# Patient Record
Sex: FEMALE | Race: WHITE | Employment: PART TIME | URBAN - METROPOLITAN AREA
[De-identification: names, ages, dates, MRNs, and addresses within clinical notes are randomized per-mention and may not be internally consistent; named-entity substitution may affect disease eponyms.]

---

## 2018-09-25 ENCOUNTER — HOSPITAL ENCOUNTER (EMERGENCY)
Age: 23
Discharge: HOME | End: 2018-09-25
Attending: EMERGENCY MEDICINE
Payer: COMMERCIAL

## 2018-09-25 ENCOUNTER — APPOINTMENT (OUTPATIENT)
Dept: CT IMAGING | Age: 23
End: 2018-09-25
Payer: COMMERCIAL

## 2018-09-25 VITALS
DIASTOLIC BLOOD PRESSURE: 53 MMHG | HEART RATE: 76 BPM | RESPIRATION RATE: 16 BRPM | SYSTOLIC BLOOD PRESSURE: 98 MMHG | TEMPERATURE: 98.2 F | OXYGEN SATURATION: 98 % | BODY MASS INDEX: 19.12 KG/M2 | WEIGHT: 112 LBS | HEIGHT: 64 IN

## 2018-09-25 DIAGNOSIS — N12 PYELONEPHRITIS: Primary | ICD-10-CM

## 2018-09-25 LAB
ALBUMIN SERPL-MCNC: 4 G/DL (ref 3.9–4.9)
ALP BLD-CCNC: 63 U/L (ref 40–130)
ALT SERPL-CCNC: 9 U/L (ref 0–33)
ANION GAP SERPL CALCULATED.3IONS-SCNC: 12 MEQ/L (ref 7–13)
AST SERPL-CCNC: 9 U/L (ref 0–35)
BACTERIA: ABNORMAL /HPF
BASOPHILS ABSOLUTE: 0 K/UL (ref 0–0.2)
BASOPHILS RELATIVE PERCENT: 0.1 %
BILIRUB SERPL-MCNC: 0.5 MG/DL (ref 0–1.2)
BILIRUBIN URINE: NEGATIVE
BLOOD, URINE: ABNORMAL
BUN BLDV-MCNC: 9 MG/DL (ref 6–20)
CALCIUM SERPL-MCNC: 9.3 MG/DL (ref 8.6–10.2)
CHLORIDE BLD-SCNC: 96 MEQ/L (ref 98–107)
CLARITY: ABNORMAL
CO2: 29 MEQ/L (ref 22–29)
COLOR: YELLOW
CREAT SERPL-MCNC: 0.54 MG/DL (ref 0.5–0.9)
EOSINOPHILS ABSOLUTE: 0 K/UL (ref 0–0.7)
EOSINOPHILS RELATIVE PERCENT: 0.2 %
EPITHELIAL CELLS, UA: ABNORMAL /HPF
GFR AFRICAN AMERICAN: >60
GFR NON-AFRICAN AMERICAN: >60
GLOBULIN: 3.1 G/DL (ref 2.3–3.5)
GLUCOSE BLD-MCNC: 110 MG/DL (ref 74–109)
GLUCOSE URINE: NEGATIVE MG/DL
HCG(URINE) PREGNANCY TEST: NEGATIVE
HCT VFR BLD CALC: 36 % (ref 37–47)
HEMOGLOBIN: 12.5 G/DL (ref 12–16)
KETONES, URINE: 40 MG/DL
LACTIC ACID: 0.6 MMOL/L (ref 0.5–2.2)
LEUKOCYTE ESTERASE, URINE: ABNORMAL
LYMPHOCYTES ABSOLUTE: 1 K/UL (ref 1–4.8)
LYMPHOCYTES RELATIVE PERCENT: 7.6 %
MCH RBC QN AUTO: 32.3 PG (ref 27–31.3)
MCHC RBC AUTO-ENTMCNC: 34.7 % (ref 33–37)
MCV RBC AUTO: 92.9 FL (ref 82–100)
MONOCYTES ABSOLUTE: 1.2 K/UL (ref 0.2–0.8)
MONOCYTES RELATIVE PERCENT: 8.8 %
NEUTROPHILS ABSOLUTE: 10.9 K/UL (ref 1.4–6.5)
NEUTROPHILS RELATIVE PERCENT: 83.3 %
NITRITE, URINE: NEGATIVE
PDW BLD-RTO: 12.4 % (ref 11.5–14.5)
PH UA: 7 (ref 5–9)
PLATELET # BLD: 216 K/UL (ref 130–400)
POTASSIUM SERPL-SCNC: 3.9 MEQ/L (ref 3.5–5.1)
PROTEIN UA: >=300 MG/DL
RBC # BLD: 3.87 M/UL (ref 4.2–5.4)
RBC UA: ABNORMAL /HPF (ref 0–2)
SODIUM BLD-SCNC: 137 MEQ/L (ref 132–144)
SPECIFIC GRAVITY UA: 1.02 (ref 1–1.03)
TOTAL PROTEIN: 7.1 G/DL (ref 6.4–8.1)
URINE REFLEX TO CULTURE: YES
UROBILINOGEN, URINE: >=8 E.U./DL
WBC # BLD: 13.1 K/UL (ref 4.8–10.8)
WBC UA: ABNORMAL /HPF (ref 0–5)

## 2018-09-25 PROCEDURE — 6360000002 HC RX W HCPCS: Performed by: EMERGENCY MEDICINE

## 2018-09-25 PROCEDURE — 80053 COMPREHEN METABOLIC PANEL: CPT

## 2018-09-25 PROCEDURE — 84703 CHORIONIC GONADOTROPIN ASSAY: CPT

## 2018-09-25 PROCEDURE — 87086 URINE CULTURE/COLONY COUNT: CPT

## 2018-09-25 PROCEDURE — 81001 URINALYSIS AUTO W/SCOPE: CPT

## 2018-09-25 PROCEDURE — 99284 EMERGENCY DEPT VISIT MOD MDM: CPT

## 2018-09-25 PROCEDURE — 85025 COMPLETE CBC W/AUTO DIFF WBC: CPT

## 2018-09-25 PROCEDURE — 87186 SC STD MICRODIL/AGAR DIL: CPT

## 2018-09-25 PROCEDURE — 6370000000 HC RX 637 (ALT 250 FOR IP): Performed by: EMERGENCY MEDICINE

## 2018-09-25 PROCEDURE — 96375 TX/PRO/DX INJ NEW DRUG ADDON: CPT

## 2018-09-25 PROCEDURE — 87077 CULTURE AEROBIC IDENTIFY: CPT

## 2018-09-25 PROCEDURE — 36415 COLL VENOUS BLD VENIPUNCTURE: CPT

## 2018-09-25 PROCEDURE — 74176 CT ABD & PELVIS W/O CONTRAST: CPT

## 2018-09-25 PROCEDURE — 2580000003 HC RX 258: Performed by: EMERGENCY MEDICINE

## 2018-09-25 PROCEDURE — 83605 ASSAY OF LACTIC ACID: CPT

## 2018-09-25 PROCEDURE — 96374 THER/PROPH/DIAG INJ IV PUSH: CPT

## 2018-09-25 RX ORDER — IBUPROFEN 800 MG/1
800 TABLET ORAL EVERY 6 HOURS PRN
COMMUNITY
End: 2020-03-19

## 2018-09-25 RX ORDER — ACETAMINOPHEN 500 MG
1000 TABLET ORAL ONCE
Status: COMPLETED | OUTPATIENT
Start: 2018-09-25 | End: 2018-09-25

## 2018-09-25 RX ORDER — MORPHINE SULFATE 4 MG/ML
4 INJECTION, SOLUTION INTRAMUSCULAR; INTRAVENOUS ONCE
Status: COMPLETED | OUTPATIENT
Start: 2018-09-25 | End: 2018-09-25

## 2018-09-25 RX ORDER — 0.9 % SODIUM CHLORIDE 0.9 %
1000 INTRAVENOUS SOLUTION INTRAVENOUS ONCE
Status: COMPLETED | OUTPATIENT
Start: 2018-09-25 | End: 2018-09-25

## 2018-09-25 RX ORDER — SULFAMETHOXAZOLE AND TRIMETHOPRIM 800; 160 MG/1; MG/1
1 TABLET ORAL 2 TIMES DAILY
Qty: 28 TABLET | Refills: 0 | Status: SHIPPED | OUTPATIENT
Start: 2018-09-25 | End: 2018-10-09

## 2018-09-25 RX ORDER — ONDANSETRON 2 MG/ML
4 INJECTION INTRAMUSCULAR; INTRAVENOUS ONCE
Status: COMPLETED | OUTPATIENT
Start: 2018-09-25 | End: 2018-09-25

## 2018-09-25 RX ADMIN — ONDANSETRON 4 MG: 2 INJECTION INTRAMUSCULAR; INTRAVENOUS at 19:59

## 2018-09-25 RX ADMIN — SODIUM CHLORIDE 1000 ML: 900 INJECTION, SOLUTION INTRAVENOUS at 21:30

## 2018-09-25 RX ADMIN — CEFTRIAXONE 1 G: 1 INJECTION, POWDER, FOR SOLUTION INTRAMUSCULAR; INTRAVENOUS at 19:58

## 2018-09-25 RX ADMIN — ACETAMINOPHEN 1000 MG: 500 TABLET, FILM COATED ORAL at 19:59

## 2018-09-25 RX ADMIN — MORPHINE SULFATE 4 MG: 4 INJECTION INTRAVENOUS at 19:59

## 2018-09-25 RX ADMIN — SODIUM CHLORIDE 1000 ML: 9 INJECTION, SOLUTION INTRAVENOUS at 19:58

## 2018-09-25 ASSESSMENT — ENCOUNTER SYMPTOMS
NAUSEA: 1
VOMITING: 1

## 2018-09-25 ASSESSMENT — PAIN DESCRIPTION - PAIN TYPE: TYPE: ACUTE PAIN

## 2018-09-25 ASSESSMENT — PAIN DESCRIPTION - FREQUENCY: FREQUENCY: CONTINUOUS

## 2018-09-25 ASSESSMENT — PAIN SCALES - GENERAL
PAINLEVEL_OUTOF10: 0
PAINLEVEL_OUTOF10: 0
PAINLEVEL_OUTOF10: 10
PAINLEVEL_OUTOF10: 10

## 2018-09-25 ASSESSMENT — PAIN DESCRIPTION - ORIENTATION: ORIENTATION: RIGHT

## 2018-09-25 ASSESSMENT — PAIN DESCRIPTION - LOCATION: LOCATION: FLANK

## 2018-09-25 ASSESSMENT — PAIN DESCRIPTION - DESCRIPTORS: DESCRIPTORS: SPASM;SHARP

## 2018-09-26 NOTE — ED PROVIDER NOTES
[address] [place] [address] ED  eMERGENCY dEPARTMENT eNCOUnter      Pt Name: Spring Duarte  MRN: [id number]  [place] 1995  Date of evaluation: 9/25/2018  Provider: Nancy Penn, [address] [place] [address]       Chief Complaint   Patient presents with    Flank Pain    Emesis         HISTORY OF PRESENT ILLNESS   (Location/Symptom, Timing/Onset, Context/Setting, Quality, Duration, Modifying Factors, Severity)  Note limiting factors. Spring Duarte is a 20 y.o. female who presents to the emergency department For the evaluation of right flank pain. Patient states it is been sore for the past 3 days. States she had a fever yesterday and today. Patient did not take any antipyretics today. Patient denies blood in urine but has been taking Azo because she has a history of urinary tract infection. Denies history of kidney stone. States she does have pain in the right lower portion of her abdomen as well. Patient does complain of nausea and vomiting. Nursing Notes were reviewed. REVIEW OF SYSTEMS    (2-9 systems for level 4, 10 or more for level 5)     Review of Systems   Constitutional: Positive for fever. Gastrointestinal: Positive for nausea and vomiting. Genitourinary: Positive for flank pain. Skin: Negative for rash. Neurological: Negative for weakness. Except as noted above the remainder of the review of systems was reviewed and negative. PAST MEDICAL HISTORY   History reviewed. No pertinent past medical history. SURGICAL HISTORY       Past Surgical History:   Procedure Laterality Date    TONSILLECTOMY      TYMPANOSTOMY TUBE PLACEMENT           CURRENT MEDICATIONS       Previous Medications    IBUPROFEN (ADVIL;MOTRIN) 800 MG TABLET    Take 800 mg by mouth every 6 hours as needed for Pain       ALLERGIES     Patient has no known allergies. FAMILY HISTORY     History reviewed. No pertinent family history.        SOCIAL HISTORY       Social History     Social History    Marital status: Single     Spouse name: N/A    Number of children: N/A    Years of education: N/A     Social History Main Topics    Smoking status: Former Smoker     Types: Cigarettes    Smokeless tobacco: Never Used    Alcohol use No    Drug use: No    Sexual activity: Not Asked     Other Topics Concern    None     Social History Narrative    None       SCREENINGS             PHYSICAL EXAM    (up to 7 for level 4, 8 or more for level 5)     ED Triage Vitals [09/25/18 1840]   BP Temp Temp Source Pulse Resp SpO2 Height Weight   110/69 100.1 °F (37.8 °C) Oral 105 18 97 % 5' 4\" (1.626 m) 112 lb (50.8 kg)       Physical Exam   Constitutional: She appears well-developed and well-nourished. No distress. HENT:   Head: Normocephalic and atraumatic. Mouth/Throat: Oropharynx is clear and moist.   Eyes: Conjunctivae are normal.   Pupils are equally round and reacting normally. Extraoccular muscles are grossly intact. Neck: Normal range of motion. No tracheal deviation present. Cardiovascular: Regular rhythm, normal heart sounds and intact distal pulses. Exam reveals no friction rub. No murmur heard. Tachycardic   Pulmonary/Chest: Effort normal and breath sounds normal. No stridor. No respiratory distress. She has no wheezes. She has no rales. Abdominal: Soft. She exhibits no distension and no mass. There is no tenderness. There is no rebound and no guarding. Musculoskeletal: Normal range of motion. She exhibits tenderness. She exhibits no edema or deformity. Patient has tenderness at the right CVA. Neurological: She is alert. Answering questions appropriately. No gaze deficit. No gait abnormality. Moving all extremities. Skin: Skin is warm and dry. Psychiatric: She has a normal mood and affect. Judgment normal.   Nursing note and vitals reviewed.       EMERGENCY DEPARTMENT COURSE and DIFFERENTIAL DIAGNOSIS/MDM:   Vitals:    Vitals:    09/25/18 1840 09/25/18 2122 09/25/18 2125 09/25/18 2234   BP:

## 2018-09-28 LAB
ORGANISM: ABNORMAL
URINE CULTURE, ROUTINE: ABNORMAL
URINE CULTURE, ROUTINE: ABNORMAL

## 2020-03-19 ENCOUNTER — OFFICE VISIT (OUTPATIENT)
Dept: OBGYN CLINIC | Age: 25
End: 2020-03-19
Payer: COMMERCIAL

## 2020-03-19 VITALS
WEIGHT: 112 LBS | SYSTOLIC BLOOD PRESSURE: 98 MMHG | DIASTOLIC BLOOD PRESSURE: 64 MMHG | BODY MASS INDEX: 19.12 KG/M2 | HEIGHT: 64 IN

## 2020-03-19 DIAGNOSIS — N93.8 DUB (DYSFUNCTIONAL UTERINE BLEEDING): ICD-10-CM

## 2020-03-19 LAB
GONADOTROPIN, CHORIONIC (HCG) QUANT: <0.1 MIU/ML
HCT VFR BLD CALC: 38.1 % (ref 37–47)
HEMOGLOBIN: 12.9 G/DL (ref 12–16)
MCH RBC QN AUTO: 32.2 PG (ref 27–31.3)
MCHC RBC AUTO-ENTMCNC: 33.8 % (ref 33–37)
MCV RBC AUTO: 95.3 FL (ref 82–100)
PDW BLD-RTO: 12.7 % (ref 11.5–14.5)
PLATELET # BLD: 195 K/UL (ref 130–400)
RBC # BLD: 4 M/UL (ref 4.2–5.4)
TSH REFLEX: 1.55 UIU/ML (ref 0.44–3.86)
WBC # BLD: 5.2 K/UL (ref 4.8–10.8)

## 2020-03-19 PROCEDURE — 1036F TOBACCO NON-USER: CPT | Performed by: OBSTETRICS & GYNECOLOGY

## 2020-03-19 PROCEDURE — G8427 DOCREV CUR MEDS BY ELIG CLIN: HCPCS | Performed by: OBSTETRICS & GYNECOLOGY

## 2020-03-19 PROCEDURE — G8420 CALC BMI NORM PARAMETERS: HCPCS | Performed by: OBSTETRICS & GYNECOLOGY

## 2020-03-19 PROCEDURE — G8484 FLU IMMUNIZE NO ADMIN: HCPCS | Performed by: OBSTETRICS & GYNECOLOGY

## 2020-03-19 PROCEDURE — 99385 PREV VISIT NEW AGE 18-39: CPT | Performed by: OBSTETRICS & GYNECOLOGY

## 2020-03-19 RX ORDER — ETONOGESTREL AND ETHINYL ESTRADIOL 11.7; 2.7 MG/1; MG/1
1 INSERT, EXTENDED RELEASE VAGINAL
COMMUNITY
Start: 2019-08-19 | End: 2022-04-27

## 2020-03-19 ASSESSMENT — ENCOUNTER SYMPTOMS
RESPIRATORY NEGATIVE: 1
RECTAL PAIN: 0
VOMITING: 0
ABDOMINAL DISTENTION: 0
BLOOD IN STOOL: 0
EYES NEGATIVE: 1
ANAL BLEEDING: 0
ALLERGIC/IMMUNOLOGIC NEGATIVE: 1
DIARRHEA: 0
CONSTIPATION: 0
NAUSEA: 0
ABDOMINAL PAIN: 0

## 2020-03-19 ASSESSMENT — PATIENT HEALTH QUESTIONNAIRE - PHQ9
1. LITTLE INTEREST OR PLEASURE IN DOING THINGS: 1
2. FEELING DOWN, DEPRESSED OR HOPELESS: 0
SUM OF ALL RESPONSES TO PHQ QUESTIONS 1-9: 1
SUM OF ALL RESPONSES TO PHQ QUESTIONS 1-9: 1
SUM OF ALL RESPONSES TO PHQ9 QUESTIONS 1 & 2: 1

## 2020-03-19 NOTE — PROGRESS NOTES
Subjective:      Patient ID: Pineda Larson is a 21 y.o. female    Annual exam.   Pap performed. STD screening offered. Monthly SBE encouraged. F/U annual or prn. Pt also wished to discuss irregular cycles x last 2 months on Nuvaring extending her appointment time by 15 minutes. Patient states she started Nuvaring continuously for heavy cycles. Patient denies new sexual partners or abnormal vaginal discharge. No recent blood thinners or steroid injections. Denies new or changed meds. Denies trauma. No major weight changes or increase in stress. Ordered labs and US. STD screening performed. F/U results as directed. Patient also wished to discuss medical management for irregular cycles. Discussed OCP, Depo Provera, Nexplanon,  Mirena and Hilary IUD. Discussed risks and benefits of each method and all questions answered. After discussion, patient opted to have Hilary placed. Importance of STD prevention with Hilary in place discussed and all questions answered. Will order IUD and call patient when available for placement. Vitals:  BP 98/64   Ht 5' 3.5\" (1.613 m)   Wt 112 lb (50.8 kg)   BMI 19.53 kg/m²   Past Medical History:   Diagnosis Date    Abnormal Pap smear of cervix     HPV in female      Past Surgical History:   Procedure Laterality Date    CYST REMOVAL      DILATION AND CURETTAGE      TONSILLECTOMY      TYMPANOSTOMY TUBE PLACEMENT       Allergies:  Diphenhydramine and Hydrocodone-acetaminophen  Current Outpatient Medications   Medication Sig Dispense Refill    etonogestrel-ethinyl estradiol (NUVARING) 0.12-0.015 MG/24HR vaginal ring 1 each by Other route       No current facility-administered medications for this visit.       Social History     Socioeconomic History    Marital status: Single     Spouse name: Not on file    Number of children: Not on file    Years of education: Not on file    Highest education level: Not on file   Occupational History    Not on file   Social Needs    Financial resource strain: Not on file    Food insecurity     Worry: Not on file     Inability: Not on file    Transportation needs     Medical: Not on file     Non-medical: Not on file   Tobacco Use    Smoking status: Former Smoker     Types: Cigarettes    Smokeless tobacco: Never Used   Substance and Sexual Activity    Alcohol use: No     Comment: socially    Drug use: No    Sexual activity: Not Currently   Lifestyle    Physical activity     Days per week: Not on file     Minutes per session: Not on file    Stress: Not on file   Relationships    Social connections     Talks on phone: Not on file     Gets together: Not on file     Attends [employer] service: Not on file     Active member of club or organization: Not on file     Attends meetings of clubs or organizations: Not on file     Relationship status: Not on file    Intimate partner violence     Fear of current or ex partner: Not on file     Emotionally abused: Not on file     Physically abused: Not on file     Forced sexual activity: Not on file   Other Topics Concern    Not on file   Social History Narrative    Not on file     Family History   Problem Relation Age of Onset    Cervical Cancer Mother     Cancer Mother     Bipolar Disorder Mother     Anxiety Disorder Mother     Migraines Mother     Breast Cancer Neg Hx     Colon Cancer Neg Hx     Diabetes Neg Hx     Eclampsia Neg Hx     Hypertension Neg Hx     Ovarian Cancer Neg Hx      Labor Neg Hx     Spont Abortions Neg Hx     Stroke Neg Hx        Review of Systems   Constitutional: Negative. Negative for activity change, appetite change, chills, diaphoresis, fatigue, fever and unexpected weight change. HENT: Negative. Eyes: Negative. Respiratory: Negative. Cardiovascular: Negative.     Gastrointestinal: Negative for abdominal distention, abdominal pain, anal bleeding, blood in stool, constipation, diarrhea, nausea, rectal pain and vomiting. Endocrine: Negative. Genitourinary: Positive for menstrual problem (DUB). Negative for decreased urine volume, difficulty urinating, dyspareunia, dysuria, enuresis, flank pain, frequency, genital sores, hematuria, pelvic pain, urgency, vaginal bleeding, vaginal discharge and vaginal pain. Musculoskeletal: Negative. Skin: Negative. Allergic/Immunologic: Negative. Neurological: Negative. Hematological: Negative. Psychiatric/Behavioral: Negative. Objective:     Physical Exam  Constitutional:       Appearance: She is well-developed. HENT:      Head: Normocephalic. Neck:      Musculoskeletal: Normal range of motion and neck supple. Thyroid: No thyromegaly. Cardiovascular:      Rate and Rhythm: Normal rate and regular rhythm. Heart sounds: Normal heart sounds. Pulmonary:      Effort: Pulmonary effort is normal. No respiratory distress. Breath sounds: Normal breath sounds. No wheezing or rales. Chest:      Chest wall: No tenderness. Breasts:         Right: No mass, nipple discharge, skin change or tenderness. Left: No mass, nipple discharge, skin change or tenderness. Abdominal:      General: Bowel sounds are normal. There is no distension. Palpations: Abdomen is soft. There is no mass. Tenderness: There is no abdominal tenderness. There is no guarding or rebound. Hernia: There is no hernia in the right inguinal area or left inguinal area. Genitourinary:     Labia:         Right: No rash, tenderness, lesion or injury. Left: No rash, tenderness, lesion or injury. Vagina: Normal. No signs of injury and foreign body. No vaginal discharge, erythema, tenderness or bleeding. Cervix: No cervical motion tenderness, discharge or friability. Uterus: Not deviated, not enlarged, not fixed and not tender. Adnexa:         Right: No mass, tenderness or fullness. Left: No mass, tenderness or fullness. Rectum: Normal.   Musculoskeletal: Normal range of motion. General: No tenderness. Lymphadenopathy:      Cervical: No cervical adenopathy. Skin:     General: Skin is warm and dry. Coloration: Skin is not pale. Findings: No erythema or rash. Neurological:      Mental Status: She is alert and oriented to person, place, and time. Psychiatric:         Behavior: Behavior normal.         Thought Content: Thought content normal.         Judgment: Judgment normal.         Assessment:       Diagnosis Orders   1. Women's annual routine gynecological examination  PAP SMEAR   2. Screening for human papillomavirus  PAP SMEAR   3. Routine screening for STI (sexually transmitted infection)  PAP SMEAR   4. DUB (dysfunctional uterine bleeding)  TSH with Reflex    HCG, Quantitative, Pregnancy    CBC    US Pelvis Complete    US Non OB Transvaginal        Plan:      Medications placedthis encounter:  No orders of the defined types were placed in this encounter. Orders placedthis encounter:  Orders Placed This Encounter   Procedures    US Pelvis Complete     Standing Status:   Future     Standing Expiration Date:   3/19/2021    US Non OB Transvaginal     Standing Status:   Future     Standing Expiration Date:   3/19/2021    PAP SMEAR     Standing Status:   Future     Standing Expiration Date:   3/19/2021     Order Specific Question:   Collection Type     Answer: Thin Prep     Order Specific Question:   Prior Abnormal Pap Test     Answer:   No     Order Specific Question:   Screening or Diagnostic     Answer:   Screening     Order Specific Question:   HPV Requested?      Answer:   Yes     Order Specific Question:   High Risk Patient     Answer:   N/A    TSH with Reflex     Standing Status:   Future     Standing Expiration Date:   3/19/2021    HCG, Quantitative, Pregnancy     Standing Status:   Future     Standing Expiration Date:   3/19/2021    CBC     Standing Status:   Future     Standing Expiration Date:   3/19/2021         Follow up:  Return for Annual, Ultrasound, Results Review, Hilary Insertion. Repeat Annual GYN exam every 1 year. Cervical Cytology exam starts age 24. If Negative Cytology;  follow-up screening per current guidelines. Mammograms yearly starting at age 36. Calcium and Vitamin D dosing reviewed ( age appropriate ). Colonoscopy and bone density screening discussed ( age appropriate ). Birth control and STD prevention discussed ( age appropriate ). Gardisil counseling completed for all patients 7-35 yo. Routine health maintenance ( per PCP and guidelines ). The patient was asked if she would like a chaperone present for her intimate exam. She  Declined the chaperone.  Diane Zheng

## 2020-03-25 NOTE — LETTER
[facility]  [place] [address]  Phone: [phone]  Fax: [phone]          March 26, 2020    Buddy Sofia  [address] [place] [id number]      Dear [facility] Police: The results of your most recent Pap smear are normal. This means that no cancerous or precancerous cells were seen. We recommend that you come back in 1 year for your next routine Pap smear. If you have any questions or concerns, please don't hesitate to call.     Sincerely,        Dr Jerald Calabrese

## 2020-03-30 ENCOUNTER — TELEPHONE (OUTPATIENT)
Dept: OBGYN CLINIC | Age: 25
End: 2020-03-30

## 2020-03-30 NOTE — TELEPHONE ENCOUNTER
Patient called today concerned about heavy bleeding on Nuva ring, pt evaluated on 3-19-20, and was informed to keep in the [address] until [facility] comes in. Please look into order for IUD and contact patient once order placed and advice if to remove ring (pt states that she is very uncomfortable and feeling dizzy). Call transferred for patient to schedule u/s.

## 2020-03-30 NOTE — TELEPHONE ENCOUNTER
Pt called back inquiring about the IUD [facility] [place]), however, no order was placed . Pt states she's been having non stop bleeding and the Radiology dept will not schedule US for DUB until 6/1/2020. I told the patient that an order will be placed today and someone will get back to her regarding the [address] getting done.

## 2020-03-30 NOTE — TELEPHONE ENCOUNTER
Per Abdulkadir Cano to order [facility]. Pt aware and will await for the phone to call when the device arrives. Patient can wait til after the the Aretha Ashford is placed to the have the [address] done. Advised patient no need to be put on another type of medication therapy while awaiting on the device to arrive.  Patient understands

## 2020-04-08 ENCOUNTER — TELEPHONE (OUTPATIENT)
Dept: OBGYN CLINIC | Age: 25
End: 2020-04-08

## 2020-04-14 ENCOUNTER — PROCEDURE VISIT (OUTPATIENT)
Dept: OBGYN CLINIC | Age: 25
End: 2020-04-14
Payer: COMMERCIAL

## 2020-04-14 VITALS
HEIGHT: 64 IN | DIASTOLIC BLOOD PRESSURE: 70 MMHG | WEIGHT: 114 LBS | BODY MASS INDEX: 19.46 KG/M2 | SYSTOLIC BLOOD PRESSURE: 112 MMHG

## 2020-04-14 LAB
HCG, URINE, POC: NEGATIVE
Lab: NORMAL
NEGATIVE QC PASS/FAIL: NORMAL
POSITIVE QC PASS/FAIL: NORMAL

## 2020-04-14 PROCEDURE — 81025 URINE PREGNANCY TEST: CPT | Performed by: OBSTETRICS & GYNECOLOGY

## 2020-04-14 PROCEDURE — 58300 INSERT INTRAUTERINE DEVICE: CPT | Performed by: OBSTETRICS & GYNECOLOGY

## 2020-04-14 RX ORDER — IBUPROFEN 800 MG/1
800 TABLET ORAL EVERY 8 HOURS PRN
Qty: 40 TABLET | Refills: 0 | Status: SHIPPED | OUTPATIENT
Start: 2020-04-14

## 2020-04-14 ASSESSMENT — ENCOUNTER SYMPTOMS
BLOOD IN STOOL: 0
CONSTIPATION: 0
EYES NEGATIVE: 1
RECTAL PAIN: 0
NAUSEA: 0
VOMITING: 0
RESPIRATORY NEGATIVE: 1
ALLERGIC/IMMUNOLOGIC NEGATIVE: 1
ANAL BLEEDING: 0
DIARRHEA: 0
ABDOMINAL DISTENTION: 0
ABDOMINAL PAIN: 0

## 2020-04-14 NOTE — PROGRESS NOTES
IUD Insertion:   Patient here for IUD ( Mirena ) placement. Urine HCG negative and denies unprotected intercourse. Cervix prepped in routine and sterile fashion. Insertion of IUD per routine without difficulty. Strings cut to 2-3 cm. Patient tolerated procedure well. F/U US 2-4 weeks to confirm appropriate placement. Vitals: There were no vitals taken for this visit. Past Medical History:   Diagnosis Date    Abnormal Pap smear of cervix     HPV in female      Past Surgical History:   Procedure Laterality Date    CYST REMOVAL      DILATION AND CURETTAGE      TONSILLECTOMY      TYMPANOSTOMY TUBE PLACEMENT       Allergies:  Diphenhydramine and Hydrocodone-acetaminophen  Current Outpatient Medications   Medication Sig Dispense Refill    etonogestrel-ethinyl estradiol (NUVARING) 0.12-0.015 MG/24HR vaginal ring 1 each by Other route       No current facility-administered medications for this visit.       Social History     Socioeconomic History    Marital status: Single     Spouse name: Not on file    Number of children: Not on file    Years of education: Not on file    Highest education level: Not on file   Occupational History    Not on file   Social Needs    Financial resource strain: Not on file    Food insecurity     Worry: Not on file     Inability: Not on file    Transportation needs     Medical: Not on file     Non-medical: Not on file   Tobacco Use    Smoking status: Former Smoker     Types: Cigarettes    Smokeless tobacco: Never Used   Substance and Sexual Activity    Alcohol use: No     Comment: socially    Drug use: No    Sexual activity: Not Currently   Lifestyle    Physical activity     Days per week: Not on file     Minutes per session: Not on file    Stress: Not on file   Relationships    Social connections     Talks on phone: Not on file     Gets together: Not on file     Attends [employer] service: Not on file     Active member of club or organization: Not on file

## 2020-04-14 NOTE — PROGRESS NOTES
A timeout was performed immediately prior to the start of the IUD insertion procedure and included the correct patient (two identifiers), correct procedure and correct site(s). Procedure consent and allergies were also verified.

## 2020-06-18 ENCOUNTER — TELEPHONE (OUTPATIENT)
Dept: OBGYN CLINIC | Age: 25
End: 2020-06-18

## 2022-04-27 ENCOUNTER — OFFICE VISIT (OUTPATIENT)
Dept: FAMILY MEDICINE CLINIC | Age: 27
End: 2022-04-27
Payer: MEDICAID

## 2022-04-27 VITALS
HEIGHT: 63 IN | BODY MASS INDEX: 19.84 KG/M2 | OXYGEN SATURATION: 99 % | WEIGHT: 112 LBS | HEART RATE: 79 BPM | DIASTOLIC BLOOD PRESSURE: 60 MMHG | TEMPERATURE: 98.2 F | SYSTOLIC BLOOD PRESSURE: 100 MMHG

## 2022-04-27 DIAGNOSIS — F43.10 PTSD (POST-TRAUMATIC STRESS DISORDER): ICD-10-CM

## 2022-04-27 DIAGNOSIS — F31.73 BIPOLAR DISORDER, IN PARTIAL REMISSION, MOST RECENT EPISODE MANIC (HCC): Primary | ICD-10-CM

## 2022-04-27 DIAGNOSIS — F41.9 ANXIETY: ICD-10-CM

## 2022-04-27 DIAGNOSIS — J45.30 MILD PERSISTENT ASTHMA WITHOUT COMPLICATION: ICD-10-CM

## 2022-04-27 PROBLEM — F31.9 BIPOLAR DISORDER (HCC): Status: ACTIVE | Noted: 2022-04-27

## 2022-04-27 PROCEDURE — 99204 OFFICE O/P NEW MOD 45 MIN: CPT | Performed by: NURSE PRACTITIONER

## 2022-04-27 RX ORDER — IBUPROFEN 800 MG/1
800 TABLET ORAL EVERY 8 HOURS PRN
Qty: 40 TABLET | Refills: 0 | Status: CANCELLED | OUTPATIENT
Start: 2022-04-27

## 2022-04-27 RX ORDER — LORAZEPAM 2 MG/1
2 TABLET ORAL EVERY 6 HOURS PRN
Status: CANCELLED | OUTPATIENT
Start: 2022-04-27

## 2022-04-27 RX ORDER — ALBUTEROL SULFATE 90 UG/1
2 AEROSOL, METERED RESPIRATORY (INHALATION) EVERY 4 HOURS PRN
COMMUNITY
Start: 2020-12-03 | End: 2022-04-27 | Stop reason: SDUPTHER

## 2022-04-27 RX ORDER — FLUTICASONE PROPIONATE AND SALMETEROL 100; 50 UG/1; UG/1
1 POWDER RESPIRATORY (INHALATION) 2 TIMES DAILY
Qty: 1 EACH | Refills: 2 | Status: SHIPPED | OUTPATIENT
Start: 2022-04-27

## 2022-04-27 RX ORDER — LORAZEPAM 2 MG/1
2 TABLET ORAL EVERY 6 HOURS PRN
COMMUNITY
End: 2022-04-27

## 2022-04-27 RX ORDER — LAMOTRIGINE 100 MG/1
100 TABLET ORAL DAILY
Qty: 30 TABLET | Status: CANCELLED | OUTPATIENT
Start: 2022-04-27

## 2022-04-27 RX ORDER — BUSPIRONE HYDROCHLORIDE 5 MG/1
5 TABLET ORAL 3 TIMES DAILY
Qty: 90 TABLET | Refills: 0 | Status: SHIPPED | OUTPATIENT
Start: 2022-04-27 | End: 2022-05-27

## 2022-04-27 RX ORDER — LAMOTRIGINE 100 MG/1
100 TABLET ORAL DAILY
COMMUNITY
End: 2022-04-27

## 2022-04-27 RX ORDER — ALBUTEROL SULFATE 90 UG/1
2 AEROSOL, METERED RESPIRATORY (INHALATION) EVERY 6 HOURS PRN
Qty: 1 EACH | Refills: 1 | Status: SHIPPED | OUTPATIENT
Start: 2022-04-27

## 2022-04-27 SDOH — ECONOMIC STABILITY: TRANSPORTATION INSECURITY
IN THE PAST 12 MONTHS, HAS THE LACK OF TRANSPORTATION KEPT YOU FROM MEDICAL APPOINTMENTS OR FROM GETTING MEDICATIONS?: NO

## 2022-04-27 SDOH — ECONOMIC STABILITY: FOOD INSECURITY: WITHIN THE PAST 12 MONTHS, THE FOOD YOU BOUGHT JUST DIDN'T LAST AND YOU DIDN'T HAVE MONEY TO GET MORE.: NEVER TRUE

## 2022-04-27 SDOH — ECONOMIC STABILITY: FOOD INSECURITY: WITHIN THE PAST 12 MONTHS, YOU WORRIED THAT YOUR FOOD WOULD RUN OUT BEFORE YOU GOT MONEY TO BUY MORE.: NEVER TRUE

## 2022-04-27 SDOH — ECONOMIC STABILITY: TRANSPORTATION INSECURITY
IN THE PAST 12 MONTHS, HAS LACK OF TRANSPORTATION KEPT YOU FROM MEETINGS, WORK, OR FROM GETTING THINGS NEEDED FOR DAILY LIVING?: NO

## 2022-04-27 ASSESSMENT — PATIENT HEALTH QUESTIONNAIRE - PHQ9
SUM OF ALL RESPONSES TO PHQ QUESTIONS 1-9: 0
SUM OF ALL RESPONSES TO PHQ9 QUESTIONS 1 & 2: 0
SUM OF ALL RESPONSES TO PHQ QUESTIONS 1-9: 0
2. FEELING DOWN, DEPRESSED OR HOPELESS: 0
SUM OF ALL RESPONSES TO PHQ QUESTIONS 1-9: 0
SUM OF ALL RESPONSES TO PHQ QUESTIONS 1-9: 0
1. LITTLE INTEREST OR PLEASURE IN DOING THINGS: 0

## 2022-04-27 ASSESSMENT — SOCIAL DETERMINANTS OF HEALTH (SDOH): HOW HARD IS IT FOR YOU TO PAY FOR THE VERY BASICS LIKE FOOD, HOUSING, MEDICAL CARE, AND HEATING?: NOT HARD AT ALL

## 2022-04-27 NOTE — PROGRESS NOTES
Subjective:     Patient ID: Alberto Huff is a 31 y.o. female who presentstoday for:  Chief Complaint   Patient presents with    New Patient     Patient presents today to establish care.         HPI   New patient establish care  Has not been seen by PCP or on medication for over a year  Has h/o bipolar, ptsd, anxiety    Past Medical History:   Diagnosis Date    Abnormal Pap smear of cervix     Bipolar and related disorder ([facility]     Bipolar disorder ([facility] 2022    HPV in female     PTSD (post-traumatic stress disorder)      Current Outpatient Medications on File Prior to Visit   Medication Sig Dispense Refill    ibuprofen (ADVIL;MOTRIN) 800 MG tablet Take 1 tablet by mouth every 8 hours as needed for Pain (Patient not taking: Reported on 2022) 40 tablet 0     Current Facility-Administered Medications on File Prior to Visit   Medication Dose Route Frequency Provider Last Rate Last Admin    levonorgestrel (MIRENA) IUD 52 mg 1 each  1 each IntraUTERine Once Nila Reich MD   1 each at 20 1119     Past Surgical History:   Procedure Laterality Date    CYST REMOVAL      DILATION AND CURETTAGE      TONSILLECTOMY      TYMPANOSTOMY TUBE PLACEMENT          Family History   Problem Relation Age of Onset    Cervical Cancer Mother     Cancer Mother     Bipolar Disorder Mother     Anxiety Disorder Mother     Migraines Mother     Breast Cancer Neg Hx     Colon Cancer Neg Hx     Diabetes Neg Hx     Eclampsia Neg Hx     Hypertension Neg Hx     Ovarian Cancer Neg Hx      Labor Neg Hx     Spont Abortions Neg Hx     Stroke Neg Hx      Social History     Socioeconomic History    Marital status: Single     Spouse name: Not on file    Number of children: Not on file    Years of education: Not on file    Highest education level: Not on file   Occupational History    Not on file   Tobacco Use    Smoking status: Former Smoker     Types: Cigarettes    Smokeless tobacco: Never Used Vaping Use    Vaping Use: Every day    Substances: Nicotine   Substance and Sexual Activity    Alcohol use: Yes     Comment: socially    Drug use: No    Sexual activity: Not Currently   Other Topics Concern    Not on file   Social History Narrative    Not on file     Social Determinants of Health     Financial Resource Strain: Low Risk     Difficulty of Paying Living Expenses: Not hard at all   Food Insecurity: No Food Insecurity    Worried About Running Out of Food in the Last Year: Never true    Navid of Food in the Last Year: Never true   Transportation Needs: No Transportation Needs    Lack of Transportation (Medical): No    Lack of Transportation (Non-Medical): No   Physical Activity:     Days of Exercise per Week: Not on file    Minutes of Exercise per Session: Not on file   Stress:     Feeling of Stress : Not on file   Social Connections:     Frequency of Communication with Friends and Family: Not on file    Frequency of Social Gatherings with Friends and Family: Not on file    Attends Sabianism Services: Not on file    Active Member of [address] [place] [address] [employer] or Organizations: Not on file    Attends Club or Organization Meetings: Not on file    Marital Status: Not on file   Intimate Partner Violence:     Fear of Current or Ex-Partner: Not on file    Emotionally Abused: Not on file    Physically Abused: Not on file    Sexually Abused: Not on file   Housing Stability:     Unable to Pay for Housing in the Last Year: Not on file    Number of [place] in the Last Year: Not on file    Unstable Housing in the Last Year: Not on file     Allergies:  Diphenhydramine and Hydrocodone-acetaminophen    Review of Systems   Constitutional: Negative. Negative for chills, diaphoresis, fatigue and fever. HENT: Negative. Negative for congestion, sore throat and trouble swallowing. Eyes: Negative. Respiratory: Positive for wheezing. Negative for cough and shortness of breath.     Cardiovascular: Negative for chest pain, palpitations and leg swelling. Gastrointestinal: Negative for abdominal pain, blood in stool, constipation, diarrhea, nausea and vomiting. Endocrine: Negative. Genitourinary: Negative. Negative for difficulty urinating. Musculoskeletal: Negative for arthralgias, gait problem, joint swelling and myalgias. Skin: Negative. Allergic/Immunologic: Positive for environmental allergies. Neurological: Negative for dizziness, syncope, speech difficulty, weakness, light-headedness and headaches. Psychiatric/Behavioral: Positive for dysphoric mood and sleep disturbance. Negative for self-injury and suicidal ideas. The patient is nervous/anxious. Objective:    /60 (Site: Left Upper Arm, Position: Sitting, Cuff Size: Medium Adult)   Pulse 79   Temp 98.2 °F (36.8 °C) (Temporal)   Ht 5' 3\" (1.6 m)   Wt 112 lb (50.8 kg)   SpO2 99%   BMI 19.84 kg/m²     Physical Exam  Constitutional:       General: She is not in acute distress. Appearance: She is well-developed. She is not toxic-appearing or diaphoretic. HENT:      Head: Normocephalic and atraumatic. Right Ear: External ear normal.      Left Ear: External ear normal.      Mouth/Throat:      Mouth: Mucous membranes are moist.   Eyes:      Extraocular Movements: Extraocular movements intact. Conjunctiva/sclera: Conjunctivae normal.      Pupils: Pupils are equal, round, and reactive to light. Cardiovascular:      Rate and Rhythm: Normal rate and regular rhythm. Heart sounds: Normal heart sounds. Pulmonary:      Effort: Pulmonary effort is normal. No respiratory distress. Breath sounds: Normal breath sounds. No wheezing. Abdominal:      General: Bowel sounds are normal.      Tenderness: There is no abdominal tenderness. Musculoskeletal:         General: No swelling or tenderness. Normal range of motion. Cervical back: Normal range of motion and neck supple. No tenderness.       Right lower leg: No edema. Left lower leg: No edema. Lymphadenopathy:      Cervical: No cervical adenopathy. Skin:     General: Skin is warm and dry. Neurological:      General: No focal deficit present. Mental Status: She is alert and oriented to person, place, and time. Cranial Nerves: No cranial nerve deficit. Motor: No weakness. Gait: Gait normal.   Psychiatric:         Mood and Affect: Mood normal.         Speech: Speech normal.         Behavior: Behavior is cooperative. Thought Content: Thought content normal. Thought content is not paranoid. Thought content does not include homicidal or suicidal ideation. Judgment: Judgment normal.         Assessment & Plan:       Diagnosis Orders   1. Bipolar disorder, in partial remission, most recent episode manic [facility])  Ambulatory referral to Psychiatry    Ambulatory referral to Psychology   2. PTSD (post-traumatic stress disorder)  Ambulatory referral to Psychiatry    Ambulatory referral to Psychology   3. Mild persistent asthma without complication  albuterol sulfate  (90 Base) MCG/ACT inhaler    fluticasone-salmeterol (ADVAIR DISKUS) 100-50 MCG/ACT AEPB diskus inhaler   4.  Anxiety  busPIRone (BUSPAR) 5 MG tablet     Orders Placed This Encounter   Procedures    Ambulatory referral to Psychiatry     Referral Priority:   Routine     Referral Type:   Psychiatric     Referral Reason:   Specialty Services Required     Referred to Provider:   Adelso Snyder MD     Requested Specialty:   Psychiatry     Number of Visits Requested:   1    Ambulatory referral to Psychology     Referral Priority:   Routine     Referral Type:   Psychiatric     Referral Reason:   Specialty Services Required     Referred to Provider:   Vanessa Oviedo PSYD     Requested Specialty:   Psychology     Number of Visits Requested:   1     Orders Placed This Encounter   Medications    albuterol sulfate  (90 Base) MCG/ACT inhaler     Sig: Inhale 2 puffs into the lungs every 6 hours as needed for Wheezing or Shortness of Breath     Dispense:  1 each     Refill:  1    fluticasone-salmeterol (ADVAIR DISKUS) 100-50 MCG/ACT AEPB diskus inhaler     Sig: Inhale 1 puff into the lungs 2 times daily Rinse after use     Dispense:  1 each     Refill:  2    busPIRone (BUSPAR) 5 MG tablet     Sig: Take 1 tablet by mouth 3 times daily     Dispense:  90 tablet     Refill:  0     Medications Discontinued During This Encounter   Medication Reason    etonogestrel-ethinyl estradiol (NUVARING) 0.12-0.015 MG/24HR vaginal ring     lamoTRIgine (LAMICTAL) 100 MG tablet LIST CLEANUP    sertraline (ZOLOFT) 50 MG tablet LIST CLEANUP    LORazepam (ATIVAN) 2 MG tablet LIST CLEANUP    albuterol sulfate  (90 Base) MCG/ACT inhaler REORDER     No follow-ups on file. Reviewed with the patient: currentclinical status, medications, activities and diet. Side effects, adverse effects of the medicationprescribed today, as well as treatment plan/ rationale and result expectations havebeen discussed with the patient who expresses understanding and desires to proceed. Pt instructions reviewed and given to patient.     Close follow up to evaluate treatment resultsand for coordination of care. I have reviewed the patient's medical historyin detail and updated the computerized patient record.     Mora Sanchez, RENETTA - CNP

## 2022-05-02 ASSESSMENT — ENCOUNTER SYMPTOMS
DIARRHEA: 0
BLOOD IN STOOL: 0
TROUBLE SWALLOWING: 0
COUGH: 0
ABDOMINAL PAIN: 0
CONSTIPATION: 0
EYES NEGATIVE: 1
SHORTNESS OF BREATH: 0
VOMITING: 0
NAUSEA: 0
SORE THROAT: 0
WHEEZING: 1

## 2022-05-12 ENCOUNTER — TELEMEDICINE (OUTPATIENT)
Dept: BEHAVIORAL/MENTAL HEALTH CLINIC | Age: 27
End: 2022-05-12
Payer: COMMERCIAL

## 2022-05-12 DIAGNOSIS — F43.9 TRAUMA AND STRESSOR-RELATED DISORDER: Primary | ICD-10-CM

## 2022-05-12 DIAGNOSIS — F41.1 GENERALIZED ANXIETY DISORDER: ICD-10-CM

## 2022-05-12 PROCEDURE — 90791 PSYCH DIAGNOSTIC EVALUATION: CPT | Performed by: PSYCHOLOGIST

## 2022-05-12 ASSESSMENT — ANXIETY QUESTIONNAIRES
2. NOT BEING ABLE TO STOP OR CONTROL WORRYING: 2-OVER HALF THE DAYS
6. BECOMING EASILY ANNOYED OR IRRITABLE: 1-SEVERAL DAYS
1. FEELING NERVOUS, ANXIOUS, OR ON EDGE: 3
7. FEELING AFRAID AS IF SOMETHING AWFUL MIGHT HAPPEN: 2-OVER HALF THE DAYS
3. WORRYING TOO MUCH ABOUT DIFFERENT THINGS: 2-OVER HALF THE DAYS
GAD7 TOTAL SCORE: 14
4. TROUBLE RELAXING: 2-OVER HALF THE DAYS
5. BEING SO RESTLESS THAT IT IS HARD TO SIT STILL: 2-OVER HALF THE DAYS

## 2022-05-12 ASSESSMENT — PATIENT HEALTH QUESTIONNAIRE - PHQ9
SUM OF ALL RESPONSES TO PHQ9 QUESTIONS 1 & 2: 2
6. FEELING BAD ABOUT YOURSELF - OR THAT YOU ARE A FAILURE OR HAVE LET YOURSELF OR YOUR FAMILY DOWN: 1
2. FEELING DOWN, DEPRESSED OR HOPELESS: 1
1. LITTLE INTEREST OR PLEASURE IN DOING THINGS: 1
SUM OF ALL RESPONSES TO PHQ QUESTIONS 1-9: 11
SUM OF ALL RESPONSES TO PHQ QUESTIONS 1-9: 11
7. TROUBLE CONCENTRATING ON THINGS, SUCH AS READING THE NEWSPAPER OR WATCHING TELEVISION: 1
3. TROUBLE FALLING OR STAYING ASLEEP: 2
SUM OF ALL RESPONSES TO PHQ QUESTIONS 1-9: 11
9. THOUGHTS THAT YOU WOULD BE BETTER OFF DEAD, OR OF HURTING YOURSELF: 0
4. FEELING TIRED OR HAVING LITTLE ENERGY: 2
8. MOVING OR SPEAKING SO SLOWLY THAT OTHER PEOPLE COULD HAVE NOTICED. OR THE OPPOSITE, BEING SO FIGETY OR RESTLESS THAT YOU HAVE BEEN MOVING AROUND A LOT MORE THAN USUAL: 0
5. POOR APPETITE OR OVEREATING: 3
SUM OF ALL RESPONSES TO PHQ QUESTIONS 1-9: 11

## 2022-05-26 ENCOUNTER — TELEMEDICINE (OUTPATIENT)
Dept: BEHAVIORAL/MENTAL HEALTH CLINIC | Age: 27
End: 2022-05-26
Payer: MEDICAID

## 2022-05-26 DIAGNOSIS — F43.9 TRAUMA AND STRESSOR-RELATED DISORDER: Primary | ICD-10-CM

## 2022-05-26 DIAGNOSIS — F41.1 GENERALIZED ANXIETY DISORDER: ICD-10-CM

## 2022-05-26 PROCEDURE — 90832 PSYTX W PT 30 MINUTES: CPT | Performed by: PSYCHOLOGIST

## 2022-05-26 ASSESSMENT — ANXIETY QUESTIONNAIRES
1. FEELING NERVOUS, ANXIOUS, OR ON EDGE: 2
GAD7 TOTAL SCORE: 17
3. WORRYING TOO MUCH ABOUT DIFFERENT THINGS: 3-NEARLY EVERY DAY
7. FEELING AFRAID AS IF SOMETHING AWFUL MIGHT HAPPEN: 1-SEVERAL DAYS
4. TROUBLE RELAXING: 3-NEARLY EVERY DAY
6. BECOMING EASILY ANNOYED OR IRRITABLE: 3-NEARLY EVERY DAY
5. BEING SO RESTLESS THAT IT IS HARD TO SIT STILL: 2-OVER HALF THE DAYS
2. NOT BEING ABLE TO STOP OR CONTROL WORRYING: 3-NEARLY EVERY DAY

## 2022-05-26 ASSESSMENT — PATIENT HEALTH QUESTIONNAIRE - PHQ9
SUM OF ALL RESPONSES TO PHQ9 QUESTIONS 1 & 2: 4
7. TROUBLE CONCENTRATING ON THINGS, SUCH AS READING THE NEWSPAPER OR WATCHING TELEVISION: 1
6. FEELING BAD ABOUT YOURSELF - OR THAT YOU ARE A FAILURE OR HAVE LET YOURSELF OR YOUR FAMILY DOWN: 1
3. TROUBLE FALLING OR STAYING ASLEEP: 3
9. THOUGHTS THAT YOU WOULD BE BETTER OFF DEAD, OR OF HURTING YOURSELF: 0
1. LITTLE INTEREST OR PLEASURE IN DOING THINGS: 2
4. FEELING TIRED OR HAVING LITTLE ENERGY: 3
SUM OF ALL RESPONSES TO PHQ QUESTIONS 1-9: 15
8. MOVING OR SPEAKING SO SLOWLY THAT OTHER PEOPLE COULD HAVE NOTICED. OR THE OPPOSITE, BEING SO FIGETY OR RESTLESS THAT YOU HAVE BEEN MOVING AROUND A LOT MORE THAN USUAL: 0
SUM OF ALL RESPONSES TO PHQ QUESTIONS 1-9: 15
2. FEELING DOWN, DEPRESSED OR HOPELESS: 2
SUM OF ALL RESPONSES TO PHQ QUESTIONS 1-9: 15
5. POOR APPETITE OR OVEREATING: 3
SUM OF ALL RESPONSES TO PHQ QUESTIONS 1-9: 15

## 2022-05-26 NOTE — PROGRESS NOTES
Behavioral Health Consultation  Brionna Ross, [address] [place] [address]. Psychologist  5/26/22  11:37 AM EDT      Time spent with Patient: 24 minutes  This is patient's second  [facility] appointment. Reason for Consult:  depression and anxiety  Referring Provider: No referring provider defined for this encounter. Feedback given to PCP. TELEHEALTH EVALUATION -- Audio and/or Visual ([facility])    Due to COVID 19 outbreak, patient's office visit was converted to a virtual visit. Patient was contacted and agreed to proceed with a virtual visit via [employer] [employer] [facility]. Patient reports that they are located at home. Provider located at home office. The risks and benefits of converting to a virtual visit were discussed in light of the current infectious disease epidemic. Patient also understood that insurance coverage and co-pays are up to their individual insurance plans. Patient provides verbal consent for this visit to be billed to insurance. Pursuant to the emergency declaration under the [address] [place] [address], [facility] waiver authority and the [employer] Act, this Virtual  Visit was conducted, with patient's consent, to reduce the patient's risk of exposure to COVID-19 and provide continuity of care for an established patient. Services were provided through an audio and video synchronous discussion virtually to substitute for in-person clinic visit. S:  Presenting Concerns:  Pt will be starting work next week. \"Dad\" showed up at her place yesterday. Was bothered by their conversation. States that she had the impression that her Dad's perspective seemed to be that it was the pt's \"fault\" that they weren't talking. Pt shared that she would like to file for visitation with her son; dad was not positive about this. States that she is now living in her own apartment.   Pt states that she has made some changes in her friend group. Is taking a financial literacy class. Discussed dynamics in relationship with mother, current and past and impact of interactions on depressive and anxiety symptoms.           History:          Diagnosis Date    Abnormal Pap smear of cervix     Bipolar and related disorder ([facility])     [facility] 4[facility]/20[facility]    HPV in female     PTSD (post-traumatic stress disorder)            Medications:   Current Outpatient Medications   Medication Sig Dispense Refill    albuterol sulfate  (90 Base) MCG/ACT inhaler Inhale 2 puffs into the lungs every 6 hours as needed for Wheezing or Shortness of Breath 1 each 1    fluticasone-salmeterol (ADVAIR DISKUS) 100-50 MCG/ACT AEPB diskus inhaler Inhale 1 puff into the lungs 2 times daily Rinse after use 1 each 2    busPIRone (BUSPAR) 5 MG tablet Take 1 tablet by mouth 3 times daily 90 tablet 0    ibuprofen (ADVIL;MOTRIN) 800 MG tablet Take 1 tablet by mouth every 8 hours as needed for Pain (Patient not taking: Reported on 4/27/2022) 40 tablet 0     Current Facility-Administered Medications   Medication Dose Route Frequency Provider Last Rate Last Admin    levonorgestrel (MIRENA) IUD 52 mg 1 each  1 each IntraUTERine Once Heath De La Rosa MD   1 each at 04/14/20 1119       Social History:   Social History     Socioeconomic History    Marital status: Single     Spouse name: Not on file    Number of children: Not on file    Years of education: Not on file    Highest education level: Not on file   Occupational History    Not on file   Tobacco Use    Smoking status: Former Smoker     Types: Cigarettes    Smokeless tobacco: Never Used   Vaping Use    Vaping Use: Every day    Substances: Nicotine   Substance and Sexual Activity    Alcohol use: Yes     Comment: socially    Drug use: No    Sexual activity: Not Currently   Other Topics Concern    Not on file   Social History Narrative    Not on file     Social Determinants of Health Financial Resource Strain: Low Risk     Difficulty of Paying Living Expenses: Not hard at all   Food Insecurity: No Food Insecurity    Worried About Running Out of Food in the Last Year: Never true    Navid of Food in the Last Year: Never true   Transportation Needs: No Transportation Needs    Lack of Transportation (Medical): No    Lack of Transportation (Non-Medical): No   Physical Activity:     Days of Exercise per Week: Not on file    Minutes of Exercise per Session: Not on file   Stress:     Feeling of Stress : Not on file   Social Connections:     Frequency of Communication with Friends and Family: Not on file    Frequency of Social Gatherings with Friends and Family: Not on file    Attends Spiritism Services: Not on file    Active Member of Clubs or Organizations: Not on file    Attends Club or Organization Meetings: Not on file    Marital Status: Not on file   Intimate Partner Violence:     Fear of Current or Ex-Partner: Not on file    Emotionally Abused: Not on file    Physically Abused: Not on file    Sexually Abused: Not on file   Housing Stability:     Unable to Pay for Housing in the Last Year: Not on file    Number of [place] in the Last Year: Not on file    Unstable Housing in the Last Year: Not on file         Family History:   Family History   Problem Relation Age of Onset    Cervical Cancer Mother     Cancer Mother     Bipolar Disorder Mother     Anxiety Disorder Mother     Migraines Mother     Breast Cancer Neg Hx     Colon Cancer Neg Hx     Diabetes Neg Hx     Eclampsia Neg Hx     Hypertension Neg Hx     Ovarian Cancer Neg Hx      Labor Neg Hx     Spont Abortions Neg Hx     Stroke Neg Hx        TOBACCO:   reports that she has quit smoking. Her smoking use included cigarettes. She has never used smokeless tobacco.  ETOH:   reports current alcohol use.        O:  MSE:    Appearance    alert, cooperative   Personal Hygiene : appropriately dressed and healthy looking  Appetite abnormal: fluctuates  Sleep disturbance Yes, including: difficulty falling asleep  Fatigue Yes  Loss of pleasure Yes  Impulsive behavior No  Speech    spontaneous, normal rate and normal volume  Mood   neutral   Affect    normal affect  Thought Content    intact  Thought Process    linear, goal directed and coherent  Associations    logical connections  Insight    good  Judgment    good  Orientation    oriented to person, place, time, and general circumstances  Memory    recent and remote memory intact  Attention/Concentration    intact  Morbid ideation No  Suicide Assessment    no suicidal ideation        A:          PHQ Scores 5/26/2022 5/12/2022 4/27/2022 3/19/2020   PHQ2 Score 4 2 0 1   PHQ9 Score 15 11 0 1     Interpretation of Total Score Depression Severity: 1-4 = Minimal depression, 5-9 = Mild depression, 10-14 = Moderate depression, 15-19 = Moderately severe depression, 20-27 = Severe depression    Administered the PHQ9 which indicates a self report of moderately severe symptom distress. GISSEL 7 SCORE 5/26/2022 5/12/2022   GISSEL-7 Total Score 17 14     Interpretation of GISSEL-7 score: 5-9 = mild anxiety, 10-14 = moderate anxiety, 15+ = severe anxiety. Recommend referral to behavioral health for scores 10 or greater. Administered GISSEL-7 which indicates a self report of severe anxiety    Pt would benefit from [facility] services to increase coping skills to provide symptom management/control/relief.        Diagnosis:    Other Specified Trauma  R/O PTSD  GISSEL        Plan:  Pt interventions:  Discussed various factors related to the development and maintenance of  depression and anxiety (including biological, cognitive, behavioral, and environmental factors), Discussed self-care (sleep, nutrition, rewarding activities, social support, exercise), Motivational Interviewing to determine importance and readiness for change, Discussed potential barriers to change and Emphasized self-care as important for managing overall health        Pt Behavioral Change Plan:  Follow up in 2 weeks  Call Lilli Templeton to get scheduled with Dr Mandi Corral    Please note this report has been partially produced using speech recognition software and may cause contain errors related to that system including grammar, punctuation and spelling as well as words and phrases that may seem inappropriate. If there are questions or concerns please feel free to contact me to clarify.

## 2022-06-10 ENCOUNTER — TELEPHONE (OUTPATIENT)
Dept: FAMILY MEDICINE CLINIC | Age: 27
End: 2022-06-10

## 2022-06-10 ENCOUNTER — TELEMEDICINE (OUTPATIENT)
Dept: BEHAVIORAL/MENTAL HEALTH CLINIC | Age: 27
End: 2022-06-10
Payer: MEDICAID

## 2022-06-10 DIAGNOSIS — F43.9 TRAUMA AND STRESSOR-RELATED DISORDER: Primary | ICD-10-CM

## 2022-06-10 DIAGNOSIS — F41.1 GENERALIZED ANXIETY DISORDER: ICD-10-CM

## 2022-06-10 PROCEDURE — 90832 PSYTX W PT 30 MINUTES: CPT | Performed by: PSYCHOLOGIST

## 2022-06-10 ASSESSMENT — PATIENT HEALTH QUESTIONNAIRE - PHQ9
2. FEELING DOWN, DEPRESSED OR HOPELESS: 2
8. MOVING OR SPEAKING SO SLOWLY THAT OTHER PEOPLE COULD HAVE NOTICED. OR THE OPPOSITE, BEING SO FIGETY OR RESTLESS THAT YOU HAVE BEEN MOVING AROUND A LOT MORE THAN USUAL: 0
9. THOUGHTS THAT YOU WOULD BE BETTER OFF DEAD, OR OF HURTING YOURSELF: 0
SUM OF ALL RESPONSES TO PHQ QUESTIONS 1-9: 12
1. LITTLE INTEREST OR PLEASURE IN DOING THINGS: 1
SUM OF ALL RESPONSES TO PHQ QUESTIONS 1-9: 12
SUM OF ALL RESPONSES TO PHQ9 QUESTIONS 1 & 2: 3
SUM OF ALL RESPONSES TO PHQ QUESTIONS 1-9: 12
SUM OF ALL RESPONSES TO PHQ QUESTIONS 1-9: 12
7. TROUBLE CONCENTRATING ON THINGS, SUCH AS READING THE NEWSPAPER OR WATCHING TELEVISION: 1
6. FEELING BAD ABOUT YOURSELF - OR THAT YOU ARE A FAILURE OR HAVE LET YOURSELF OR YOUR FAMILY DOWN: 1
5. POOR APPETITE OR OVEREATING: 2
3. TROUBLE FALLING OR STAYING ASLEEP: 3
4. FEELING TIRED OR HAVING LITTLE ENERGY: 2

## 2022-06-10 ASSESSMENT — ANXIETY QUESTIONNAIRES
1. FEELING NERVOUS, ANXIOUS, OR ON EDGE: 3
5. BEING SO RESTLESS THAT IT IS HARD TO SIT STILL: 1-SEVERAL DAYS
7. FEELING AFRAID AS IF SOMETHING AWFUL MIGHT HAPPEN: 2-OVER HALF THE DAYS
4. TROUBLE RELAXING: 2-OVER HALF THE DAYS
6. BECOMING EASILY ANNOYED OR IRRITABLE: 1-SEVERAL DAYS
GAD7 TOTAL SCORE: 13
2. NOT BEING ABLE TO STOP OR CONTROL WORRYING: 2-OVER HALF THE DAYS
3. WORRYING TOO MUCH ABOUT DIFFERENT THINGS: 2-OVER HALF THE DAYS

## 2022-06-10 NOTE — TELEPHONE ENCOUNTER
Called and left a message letting [facility] know that I have switched her appointment on 06/17/2022 to a virtual per her request.

## 2022-06-10 NOTE — TELEPHONE ENCOUNTER
----- Message from [facility] sent at 6/10/2022 10:33 AM EDT -----  Subject: Message to Provider    QUESTIONS  Information for Provider? Patient was calling to see if she can get her   appointment switched to a vv on 6/17/2022 at 1:45pm patient would like a   call back. ---------------------------------------------------------------------------  --------------  Karri BARBOZA  What is the best way for the office to contact you? OK to leave message on   voicemail  Preferred Call Back Phone Number? [phone]  ---------------------------------------------------------------------------  --------------  SCRIPT ANSWERS  Relationship to Patient?  Self

## 2022-06-10 NOTE — PROGRESS NOTES
Behavioral Health Consultation  Ignacio Barr, [address]. Psychologist  6/10/22  10:37 AM EDT      Time spent with Patient: 24 minutes  This is patient's third  [facility] appointment. Reason for Consult:  depression and anxiety  Referring Provider: No referring provider defined for this encounter. Feedback given to PCP. TELEHEALTH EVALUATION -- Audio and/or Visual (During MSGKP-60 public health emergency)    Due to COVID 19 outbreak, patient's office visit was converted to a virtual visit. Patient was contacted and agreed to proceed with a virtual visit via [employer]. Patient reports that they are located at home. Provider located at home office. The risks and benefits of converting to a virtual visit were discussed in light of the current infectious disease epidemic. Patient also understood that insurance coverage and co-pays are up to their individual insurance plans. Patient provides verbal consent for this visit to be billed to insurance. Pursuant to the emergency declaration under the [address] [place] [address], [facility] waiver authority and the [employer] Act, this Virtual  Visit was conducted, with patient's consent, to reduce the patient's risk of exposure to COVID-19 and provide continuity of care for an established patient. Services were provided through an audio and video synchronous discussion virtually to substitute for in-person clinic visit. S:  Presenting Concerns:  Pt describes a recent conversation with her mom. Shared with mom about why she wasn't talking with her, notes that mom had been very understanding but pt is somewhat suspicious about this.    Had texted her father to ask mom to unblock her  Had a falling out with best friend  Expressed some concerns about nephew who lives close by; trying to coordinate having nephew move to parents  Trying to work on calming self down before calling mom  Discussed past experiences with son and interaction with mom  Pt reports that she started working 8:30-6. Working towards increasing contact with son. Is now going to have scheduled phone calls with mom.  Plans to speak with son's therapist.        History:          Diagnosis Date    Abnormal Pap smear of cervix     [facility]     Bipolar disorder ([facility] [address] 4/27/2022    HPV in female     PTSD (post-traumatic stress disorder)            Medications:   Current Outpatient Medications   Medication Sig Dispense Refill    albuterol sulfate  (90 Base) MCG/ACT inhaler Inhale 2 puffs into the lungs every 6 hours as needed for Wheezing or Shortness of Breath 1 each 1    fluticasone-salmeterol (ADVAIR DISKUS) 100-50 MCG/ACT AEPB diskus inhaler Inhale 1 puff into the lungs 2 times daily Rinse after use 1 each 2    ibuprofen (ADVIL;MOTRIN) 800 MG tablet Take 1 tablet by mouth every 8 hours as needed for Pain (Patient not taking: Reported on 4/27/2022) 40 tablet 0     Current Facility-Administered Medications   Medication Dose Route Frequency Provider Last Rate Last Admin    levonorgestrel (MIRENA) IUD 52 mg 1 each  1 each IntraUTERine Once Westley Levi MD   1 each at 04/14/20 1119       Social History:   Social History     Socioeconomic History    Marital status: Single     Spouse name: Not on file    Number of children: Not on file    Years of education: Not on file    Highest education level: Not on file   Occupational History    Not on file   Tobacco Use    Smoking status: Former Smoker     Types: Cigarettes    Smokeless tobacco: Never Used   Vaping Use    Vaping Use: Every day    Substances: Nicotine   Substance and Sexual Activity    Alcohol use: Yes     Comment: socially    Drug use: No    Sexual activity: Not Currently   Other Topics Concern    Not on file   Social History Narrative    Not on file     Social Determinants of Health     Financial Resource Strain: Low Risk     Difficulty of Paying Living Expenses: Not hard at all   Food Insecurity: No Food Insecurity    Worried About Running Out of Food in the Last Year: Never true    Ran Out of Food in the Last Year: Never true   Transportation Needs: No Transportation Needs    Lack of Transportation (Medical): No    Lack of Transportation (Non-Medical): No   Physical Activity:     Days of Exercise per Week: Not on file    Minutes of Exercise per Session: Not on file   Stress:     Feeling of Stress : Not on file   Social Connections:     Frequency of Communication with Friends and Family: Not on file    Frequency of Social Gatherings with Friends and Family: Not on file    Attends Hoahaoism Services: Not on file    Active Member of Clubs or Organizations: Not on file    Attends Club or Organization Meetings: Not on file    Marital Status: Not on file   Intimate Partner Violence:     Fear of Current or Ex-Partner: Not on file    Emotionally Abused: Not on file    Physically Abused: Not on file    Sexually Abused: Not on file   Housing Stability:     Unable to Pay for Housing in the Last Year: Not on file    Number of [place] in the Last Year: Not on file    Unstable Housing in the Last Year: Not on file         Family History:   Family History   Problem Relation Age of Onset    Cervical Cancer Mother     Cancer Mother     Bipolar Disorder Mother     Anxiety Disorder Mother     Migraines Mother     Breast Cancer Neg Hx     Colon Cancer Neg Hx     Diabetes Neg Hx     Eclampsia Neg Hx     Hypertension Neg Hx     Ovarian Cancer Neg Hx      Labor Neg Hx     Spont Abortions Neg Hx     Stroke Neg Hx        TOBACCO:   reports that she has quit smoking. Her smoking use included cigarettes. She has never used smokeless tobacco.  ETOH:   reports current alcohol use.        O:  MSE:    Appearance    alert, cooperative   Personal Hygiene : appropriately dressed and healthy looking  Appetite abnormal: fluctuates  Sleep disturbance Yes, including: difficulty falling asleep  Fatigue Yes  Loss of pleasure Yes  Impulsive behavior No  Speech    spontaneous, normal rate and normal volume  Mood   neutral   Affect    normal affect  Thought Content    intact  Thought Process    linear, goal directed and coherent  Associations    logical connections  Insight    good  Judgment    good  Orientation    oriented to person, place, time, and general circumstances  Memory    recent and remote memory intact  Attention/Concentration    intact  Morbid ideation No  Suicide Assessment    no suicidal ideation        A:          PHQ Scores 6/10/2022 5/26/2022 5/12/2022 4/27/2022 3/19/2020   PHQ2 Score 3 4 2 0 1   PHQ9 Score 12 15 11 0 1     Interpretation of Total Score Depression Severity: 1-4 = Minimal depression, 5-9 = Mild depression, 10-14 = Moderate depression, 15-19 = Moderately severe depression, 20-27 = Severe depression    Administered the PHQ9 which indicates a self report of moderate symptom distress. GISSEL 7 SCORE 6/10/2022 5/26/2022 5/12/2022   GISSEL-7 Total Score 13 17 14     Interpretation of GISSEL-7 score: 5-9 = mild anxiety, 10-14 = moderate anxiety, 15+ = severe anxiety. Recommend referral to behavioral health for scores 10 or greater. Administered GISSEL-7 which indicates a self report of  moderate anxiety    Pt would benefit from [address] [place] [address] services to increase coping skills to provide symptom management/control/relief.        Diagnosis:    Other Specified Trauma  R/O PTSD  GISSEL        Plan:  Pt interventions:  Discussed various factors related to the development and maintenance of  depression and anxiety (including biological, cognitive, behavioral, and environmental factors), Discussed self-care (sleep, nutrition, rewarding activities, social support, exercise), Motivational Interviewing to determine importance and readiness for change, Discussed potential barriers to change, Emphasized self-care as important for managing overall health and Problem-solving re: communication with family of origin and son        Pt Behavioral Change Plan:  Follow up in 2 weeks      Please note this report has been partially produced using speech recognition software and may cause contain errors related to that system including grammar, punctuation and spelling as well as words and phrases that may seem inappropriate. If there are questions or concerns please feel free to contact me to clarify.

## 2022-06-17 ENCOUNTER — TELEMEDICINE (OUTPATIENT)
Dept: FAMILY MEDICINE CLINIC | Age: 27
End: 2022-06-17
Payer: MEDICAID

## 2022-06-17 DIAGNOSIS — F31.73 BIPOLAR DISORDER, IN PARTIAL REMISSION, MOST RECENT EPISODE MANIC (HCC): Primary | ICD-10-CM

## 2022-06-17 DIAGNOSIS — F43.10 PTSD (POST-TRAUMATIC STRESS DISORDER): ICD-10-CM

## 2022-06-17 DIAGNOSIS — F41.9 ANXIETY: ICD-10-CM

## 2022-06-17 PROCEDURE — 99214 OFFICE O/P EST MOD 30 MIN: CPT | Performed by: NURSE PRACTITIONER

## 2022-06-17 RX ORDER — FLUOXETINE 10 MG/1
10 CAPSULE ORAL DAILY
Qty: 30 CAPSULE | Refills: 3 | Status: SHIPPED | OUTPATIENT
Start: 2022-06-17 | End: 2022-06-24 | Stop reason: SDUPTHER

## 2022-06-17 RX ORDER — FLUOXETINE 10 MG/1
10 CAPSULE ORAL DAILY
Qty: 30 CAPSULE | Refills: 3 | Status: SHIPPED | OUTPATIENT
Start: 2022-06-17 | End: 2022-06-17 | Stop reason: SDUPTHER

## 2022-06-17 ASSESSMENT — ENCOUNTER SYMPTOMS
VOMITING: 0
DIARRHEA: 0
NAUSEA: 0
TROUBLE SWALLOWING: 0
EYES NEGATIVE: 1
SHORTNESS OF BREATH: 0
SORE THROAT: 0

## 2022-06-17 NOTE — PROGRESS NOTES
Subjective:     Patient ID: Annie Mejia is a 31 y.o. female who presentstoday for:  Chief Complaint   Patient presents with    Anxiety     6 week f/u on Anxiety, Bipolar and PTSD.         HPI  Patient for f/u anxiety/depression/bipolar  Did not like how she felt on buspar so she stopped taking it  Is continuing to meet with [facility] which has been beneficial    Past Medical History:   Diagnosis Date    Abnormal Pap smear of cervix     [facility] [address] [facility]     Bipolar disorder ([facility] [address] 2022    HPV in female     PTSD (post-traumatic stress disorder)      Current Outpatient Medications on File Prior to Visit   Medication Sig Dispense Refill    albuterol sulfate  (90 Base) MCG/ACT inhaler Inhale 2 puffs into the lungs every 6 hours as needed for Wheezing or Shortness of Breath 1 each 1    fluticasone-salmeterol (ADVAIR DISKUS) 100-50 MCG/ACT AEPB diskus inhaler Inhale 1 puff into the lungs 2 times daily Rinse after use 1 each 2    ibuprofen (ADVIL;MOTRIN) 800 MG tablet Take 1 tablet by mouth every 8 hours as needed for Pain 40 tablet 0     Current Facility-Administered Medications on File Prior to Visit   Medication Dose Route Frequency Provider Last Rate Last Admin    levonorgestrel (MIRENA) IUD 52 mg 1 each  1 each IntraUTERine Once Sidny Olivo MD   1 each at 20 1119     Past Surgical History:   Procedure Laterality Date    CYST REMOVAL      DILATION AND CURETTAGE      TONSILLECTOMY      TYMPANOSTOMY TUBE PLACEMENT          Family History   Problem Relation Age of Onset    Cervical Cancer Mother     Cancer Mother     Bipolar Disorder Mother     Anxiety Disorder Mother     Migraines Mother     Breast Cancer Neg Hx     Colon Cancer Neg Hx     Diabetes Neg Hx     Eclampsia Neg Hx     Hypertension Neg Hx     Ovarian Cancer Neg Hx      Labor Neg Hx     Spont Abortions Neg Hx     Stroke Neg Hx      Social History     Socioeconomic History    Marital status: Single     Spouse name: Not on file    Number of children: Not on file    Years of education: Not on file    Highest education level: Not on file   Occupational History    Not on file   Tobacco Use    Smoking status: Former Smoker     Types: Cigarettes    Smokeless tobacco: Never Used   Vaping Use    Vaping Use: Every day    Substances: Nicotine   Substance and Sexual Activity    Alcohol use: Yes     Comment: socially    Drug use: No    Sexual activity: Not Currently   Other Topics Concern    Not on file   Social History Narrative    Not on file     Social Determinants of Health     Financial Resource Strain: Low Risk     Difficulty of Paying Living Expenses: Not hard at all   Food Insecurity: No Food Insecurity    Worried About [address] [employer] in the Last Year: Never true    [address]  [employer] in the Last Year: Never true   Transportation Needs: No Transportation Needs    Lack of Transportation (Medical): No    Lack of Transportation (Non-Medical):  No   Physical Activity:     Days of Exercise per Week: Not on file    Minutes of Exercise per Session: Not on file   Stress:     Feeling of Stress : Not on file   Social Connections:     Frequency of Communication with Friends and Family: Not on file    Frequency of Social Gatherings with Friends and Family: Not on file    Attends [employer] Services: Not on file    Active Member of [address] [place] [address] or Organizations: Not on file    Attends Club or Organization Meetings: Not on file    Marital Status: Not on file   Intimate Partner Violence:     Fear of Current or Ex-Partner: Not on file    Emotionally Abused: Not on file    Physically Abused: Not on file    Sexually Abused: Not on file   Housing Stability:     Unable to Pay for Housing in the Last Year: Not on file    Number of [place] in the Last Year: Not on file    Unstable Housing in the Last Year: Not on file     Allergies:  Diphenhydramine and Hydrocodone-acetaminophen    Review of Systems   Constitutional: Negative. Negative for chills, diaphoresis, fatigue and fever. HENT: Negative. Negative for congestion, sore throat and trouble swallowing. Eyes: Negative. Respiratory: Negative for shortness of breath. Cardiovascular: Negative for chest pain. Gastrointestinal: Negative for diarrhea, nausea and vomiting. Endocrine: Negative. Genitourinary: Negative. Negative for difficulty urinating. Musculoskeletal: Negative for arthralgias and myalgias. Skin: Negative. Neurological: Negative for dizziness, weakness, light-headedness and headaches. Psychiatric/Behavioral: Positive for dysphoric mood and sleep disturbance. Negative for self-injury and suicidal ideas. The patient is nervous/anxious. Objective: There were no vitals taken for this visit. Physical Exam  Constitutional:       General: She is not in acute distress. Pulmonary:      Effort: No respiratory distress. Neurological:      Mental Status: She is alert and oriented to person, place, and time. Psychiatric:         Attention and Perception: Attention normal.         Mood and Affect: Mood normal.         Speech: Speech normal.         Behavior: Behavior normal.         Thought Content: Thought content normal. Thought content is not paranoid. Thought content does not include homicidal or suicidal ideation. Assessment & Plan:       Diagnosis Orders   1. Bipolar disorder, in partial remission, most recent episode manic (HCC)  FLUoxetine (PROZAC) 10 MG capsule   2. PTSD (post-traumatic stress disorder)  FLUoxetine (PROZAC) 10 MG capsule   3. Anxiety  FLUoxetine (PROZAC) 10 MG capsule     No orders of the defined types were placed in this encounter.     Orders Placed This Encounter   Medications    DISCONTD: FLUoxetine (PROZAC) 10 MG capsule     Sig: Take 1 capsule by mouth daily     Dispense:  30 capsule     Refill:  3    FLUoxetine (PROZAC) 10 MG capsule     Sig: Take 1 capsule by mouth daily     Dispense:  30 capsule     Refill:  3     Medications Discontinued During This Encounter   Medication Reason    FLUoxetine (PROZAC) 10 MG capsule REORDER     Return in about 4 weeks (around 7/15/2022). Reviewed with the patient: currentclinical status, medications, activities and diet. Side effects, adverse effects of the medicationprescribed today, as well as treatment plan/ rationale and result expectations havebeen discussed with the patient who expresses understanding and desires to proceed. Pt instructions reviewed and given to patient.     Close follow up to evaluate treatment resultsand for coordination of care. I have reviewed the patient's medical historyin detail and updated the computerized patient record.     Esequiel Boggs, APRN - CNP

## 2022-06-23 ENCOUNTER — PATIENT MESSAGE (OUTPATIENT)
Dept: FAMILY MEDICINE CLINIC | Age: 27
End: 2022-06-23

## 2022-06-23 DIAGNOSIS — F31.73 BIPOLAR DISORDER, IN PARTIAL REMISSION, MOST RECENT EPISODE MANIC (HCC): ICD-10-CM

## 2022-06-23 DIAGNOSIS — F41.9 ANXIETY: ICD-10-CM

## 2022-06-23 DIAGNOSIS — F43.10 PTSD (POST-TRAUMATIC STRESS DISORDER): ICD-10-CM

## 2022-06-24 RX ORDER — FLUOXETINE 10 MG/1
10 CAPSULE ORAL DAILY
Qty: 30 CAPSULE | Refills: 3 | Status: SHIPPED | OUTPATIENT
Start: 2022-06-24

## 2022-07-19 ENCOUNTER — NURSE TRIAGE (OUTPATIENT)
Dept: OTHER | Facility: CLINIC | Age: 27
End: 2022-07-19

## 2022-07-19 ENCOUNTER — OFFICE VISIT (OUTPATIENT)
Dept: FAMILY MEDICINE CLINIC | Age: 27
End: 2022-07-19
Payer: MEDICAID

## 2022-07-19 VITALS
SYSTOLIC BLOOD PRESSURE: 102 MMHG | HEART RATE: 72 BPM | TEMPERATURE: 98.7 F | RESPIRATION RATE: 16 BRPM | WEIGHT: 110.4 LBS | BODY MASS INDEX: 19.56 KG/M2 | DIASTOLIC BLOOD PRESSURE: 62 MMHG | HEIGHT: 63 IN

## 2022-07-19 DIAGNOSIS — J02.9 SORE THROAT: Primary | ICD-10-CM

## 2022-07-19 LAB
Lab: ABNORMAL
PERFORMING INSTRUMENT: ABNORMAL
QC PASS/FAIL: ABNORMAL
S PYO AG THROAT QL: NORMAL
S PYO AG THROAT QL: NORMAL
SARS-COV-2, POC: ABNORMAL

## 2022-07-19 PROCEDURE — 87880 STREP A ASSAY W/OPTIC: CPT | Performed by: NURSE PRACTITIONER

## 2022-07-19 PROCEDURE — 99213 OFFICE O/P EST LOW 20 MIN: CPT | Performed by: NURSE PRACTITIONER

## 2022-07-19 PROCEDURE — 87426 SARSCOV CORONAVIRUS AG IA: CPT | Performed by: NURSE PRACTITIONER

## 2022-07-19 RX ORDER — AMOXICILLIN 400 MG/5ML
500 POWDER, FOR SUSPENSION ORAL 2 TIMES DAILY
Qty: 126 ML | Refills: 0 | Status: SHIPPED | OUTPATIENT
Start: 2022-07-19 | End: 2022-07-25

## 2022-07-19 ASSESSMENT — ENCOUNTER SYMPTOMS
WHEEZING: 0
SHORTNESS OF BREATH: 0
VOMITING: 0
RHINORRHEA: 0
COUGH: 0
NAUSEA: 0
SORE THROAT: 1
DIARRHEA: 0

## 2022-07-19 NOTE — PROGRESS NOTES
Subjective:      Patient ID: Al Whelan is a 31 y.o. female who presents today for:  Chief Complaint   Patient presents with    Pharyngitis     States she does not have tonsils. . states she has lump under lower right jaw- had covid test done at [facility]        Monday morning woke up with sore throat   Last 2 days has gotten worse   Feels like razor blades in her throat   Hurts every time she swallows   No other symptoms   No fevers   Feels like a lump in her throat   Seems like she has a lymph node swollen now   Hx tonsillecomty   Due to Recurrent strep         Past Medical History:   Diagnosis Date    Abnormal Pap smear of cervix     Bipolar and related disorder ([facility] [address]     Bipolar disorder ([facility] [address] 4/27/2022    HPV in female     PTSD (post-traumatic stress disorder)      Past Surgical History:   Procedure Laterality Date    CYST REMOVAL      DILATION AND CURETTAGE      TONSILLECTOMY      TYMPANOSTOMY TUBE PLACEMENT       Social History     Socioeconomic History    Marital status: Single     Spouse name: Not on file    Number of children: Not on file    Years of education: Not on file    Highest education level: Not on file   Occupational History    Not on file   Tobacco Use    Smoking status: Former     Types: Cigarettes    Smokeless tobacco: Never   Vaping Use    Vaping Use: Every day    Substances: Nicotine   Substance and Sexual Activity    Alcohol use: Yes     Comment: socially    Drug use: No    Sexual activity: Not Currently   Other Topics Concern    Not on file   Social History Narrative    Not on file     Social Determinants of Health     Financial Resource Strain: Low Risk     Difficulty of Paying Living Expenses: Not hard at all   Food Insecurity: No Food Insecurity    Worried About Running Out of Food in the Last Year: Never true    Ran Out of Food in the Last Year: Never true   Transportation Needs: No Transportation Needs    Lack of Transportation (Medical): No    Lack of Transportation (Non-Medical): No   Physical Activity: Not on file   Stress: Not on file   Social Connections: Not on file   Intimate Partner Violence: Not on file   Housing Stability: Not on file     Family History   Problem Relation Age of Onset    Cervical Cancer Mother     Cancer Mother     Bipolar Disorder Mother     Anxiety Disorder Mother     Migraines Mother     Breast Cancer Neg Hx     Colon Cancer Neg Hx     Diabetes Neg Hx     Eclampsia Neg Hx     Hypertension Neg Hx     Ovarian Cancer Neg Hx      Labor Neg Hx     Spont Abortions Neg Hx     Stroke Neg Hx      Allergies   Allergen Reactions    Diphenhydramine Hives     Other reaction(s): Unknown  Reverse affects  Reverse affects      Hydrocodone-Acetaminophen Hives, Other (See Comments) and Nausea And Vomiting     Hot flashes       Current Outpatient Medications   Medication Sig Dispense Refill    amoxicillin (AMOXIL) 400 MG/5ML suspension Take 6.3 mLs by mouth in the morning and 6.3 mLs before bedtime. Do all this for 10 days. 126 mL 0    FLUoxetine (PROZAC) 10 MG capsule Take 1 capsule by mouth daily 30 capsule 3    albuterol sulfate  (90 Base) MCG/ACT inhaler Inhale 2 puffs into the lungs every 6 hours as needed for Wheezing or Shortness of Breath 1 each 1    fluticasone-salmeterol (ADVAIR DISKUS) 100-50 MCG/ACT AEPB diskus inhaler Inhale 1 puff into the lungs 2 times daily Rinse after use 1 each 2    ibuprofen (ADVIL;MOTRIN) 800 MG tablet Take 1 tablet by mouth every 8 hours as needed for Pain 40 tablet 0     Current Facility-Administered Medications   Medication Dose Route Frequency Provider Last Rate Last Admin    levonorgestrel (MIRENA) IUD 52 mg 1 each  1 each IntraUTERine Once Chris Dorado MD   1 each at 20 1119          Review of Systems   Constitutional:  Negative for chills, fatigue and fever. HENT:  Positive for sore throat. Negative for congestion and rhinorrhea. Respiratory:  Negative for cough, shortness of breath and wheezing. Gastrointestinal:  Negative for diarrhea, nausea and vomiting. Musculoskeletal:  Negative for myalgias. Skin:  Negative for rash. Neurological:  Negative for headaches. Hematological:  Positive for adenopathy. Psychiatric/Behavioral:  Negative for agitation, confusion and hallucinations. Objective:   /62 (Site: Right Upper Arm, Position: Sitting, Cuff Size: Medium Adult)   Pulse 72   Temp 98.7 °F (37.1 °C) (Temporal)   Resp 16   Ht 5' 3\" (1.6 m)   Wt 110 lb 6.4 oz (50.1 kg)   BMI 19.56 kg/m²     Physical Exam  Vitals reviewed. Constitutional:       Appearance: Normal appearance. She is normal weight. HENT:      Head: Normocephalic and atraumatic. Right Ear: No middle ear effusion. No mastoid tenderness. Tympanic membrane is not injected, erythematous or bulging. Left Ear:  No middle ear effusion. No mastoid tenderness. Tympanic membrane is not injected, erythematous or bulging. Nose: Nose normal.      Mouth/Throat:      Lips: Pink. Pharynx: Oropharynx is clear. No pharyngeal swelling or posterior oropharyngeal erythema. Tonsils: 0 on the right. 0 on the left. Eyes:      General: Lids are normal.      Conjunctiva/sclera: Conjunctivae normal.   Cardiovascular:      Rate and Rhythm: Normal rate. Pulmonary:      Effort: Pulmonary effort is normal.   Musculoskeletal:         General: Normal range of motion. Cervical back: Normal range of motion. Skin:     General: Skin is warm and dry. Neurological:      General: No focal deficit present. Mental Status: She is alert and oriented to person, place, and time. Psychiatric:         Mood and Affect: Mood normal.         Behavior: Behavior normal. Behavior is cooperative. Assessment:       Diagnosis Orders   1.  Sore throat  POCT COVID-19, Antigen    POCT rapid strep A    POCT rapid strep A    amoxicillin (AMOXIL) 400 MG/5ML suspension        Results for POC orders placed in visit on 07/19/22 home in stable condition. Follow up with PCP to evaluate treatment results or return if symptoms worsen or fail to improve. Discussed signs and symptoms which require immediate follow-up in ED/call to 911. Understanding verbalized. I have reviewed the patient's medical history in detail and updated the computerized patient record.     Josephine Funez, APRN - CNP

## 2022-07-25 ENCOUNTER — TELEPHONE (OUTPATIENT)
Dept: FAMILY MEDICINE CLINIC | Age: 27
End: 2022-07-25

## 2022-07-25 DIAGNOSIS — J02.9 SORE THROAT: Primary | ICD-10-CM

## 2022-07-25 RX ORDER — AMOXICILLIN 500 MG/1
500 CAPSULE ORAL 2 TIMES DAILY
Qty: 20 CAPSULE | Refills: 0 | Status: SHIPPED | OUTPATIENT
Start: 2022-07-25 | End: 2022-08-04

## 2022-07-25 NOTE — TELEPHONE ENCOUNTER
[address] called and states that patient received a prescription for   amoxicillin (AMOXIL) 400 MG/5ML suspension  Patient would like medication to be sent to her via [employer] but they are unable to due it being liquid form. Pharmacy is requesting that a script be reissued in tablet form to fulfill the patient delivery request. Patient uses [facility].   Please approve or deny this request    Dillan Tejada   [phone]

## 2022-12-15 ENCOUNTER — HOSPITAL ENCOUNTER (EMERGENCY)
Age: 27
Discharge: HOME | End: 2022-12-15
Attending: EMERGENCY MEDICINE
Payer: COMMERCIAL

## 2022-12-15 VITALS
SYSTOLIC BLOOD PRESSURE: 91 MMHG | OXYGEN SATURATION: 100 % | BODY MASS INDEX: 20.73 KG/M2 | HEIGHT: 63 IN | TEMPERATURE: 98.7 F | HEART RATE: 75 BPM | RESPIRATION RATE: 16 BRPM | WEIGHT: 117 LBS | DIASTOLIC BLOOD PRESSURE: 61 MMHG

## 2022-12-15 DIAGNOSIS — O21.9 NAUSEA/VOMITING IN PREGNANCY: ICD-10-CM

## 2022-12-15 DIAGNOSIS — R82.71 BACTERIURIA: Primary | ICD-10-CM

## 2022-12-15 LAB
ALBUMIN SERPL-MCNC: 4.2 G/DL (ref 3.5–4.6)
ALP BLD-CCNC: 37 U/L (ref 40–130)
ALT SERPL-CCNC: 12 U/L (ref 0–33)
ANION GAP SERPL CALCULATED.3IONS-SCNC: 11 MEQ/L (ref 9–15)
AST SERPL-CCNC: 17 U/L (ref 0–35)
BACTERIA: ABNORMAL /HPF
BILIRUB SERPL-MCNC: 0.3 MG/DL (ref 0.2–0.7)
BILIRUBIN URINE: NEGATIVE
BLOOD, URINE: NEGATIVE
BUN BLDV-MCNC: 7 MG/DL (ref 6–20)
CALCIUM SERPL-MCNC: 9.4 MG/DL (ref 8.5–9.9)
CHLORIDE BLD-SCNC: 103 MEQ/L (ref 95–107)
CLARITY: ABNORMAL
CO2: 26 MEQ/L (ref 20–31)
COLOR: YELLOW
CREAT SERPL-MCNC: 0.55 MG/DL (ref 0.5–0.9)
EPITHELIAL CELLS, UA: ABNORMAL /HPF (ref 0–5)
FINE CASTS, UA: ABNORMAL /LPF (ref 0–5)
GFR SERPL CREATININE-BSD FRML MDRD: >60 ML/MIN/{1.73_M2}
GLOBULIN: 2.5 G/DL (ref 2.3–3.5)
GLUCOSE BLD-MCNC: 82 MG/DL (ref 70–99)
GLUCOSE URINE: NEGATIVE MG/DL
HCG QUALITATIVE: POSITIVE
HCT VFR BLD CALC: 36.1 % (ref 37–47)
HEMOGLOBIN: 12.1 G/DL (ref 12–16)
KETONES, URINE: ABNORMAL MG/DL
LEUKOCYTE ESTERASE, URINE: ABNORMAL
LIPASE: 24 U/L (ref 12–95)
MCH RBC QN AUTO: 32.1 PG (ref 27–31.3)
MCHC RBC AUTO-ENTMCNC: 33.6 % (ref 33–37)
MCV RBC AUTO: 95.4 FL (ref 79.4–94.8)
NITRITE, URINE: NEGATIVE
PDW BLD-RTO: 12.2 % (ref 11.5–14.5)
PH UA: 6.5 (ref 5–9)
PLATELET # BLD: 190 K/UL (ref 130–400)
POTASSIUM REFLEX MAGNESIUM: 4 MEQ/L (ref 3.4–4.9)
PROTEIN UA: ABNORMAL MG/DL
RBC # BLD: 3.78 M/UL (ref 4.2–5.4)
RBC UA: ABNORMAL /HPF (ref 0–5)
SODIUM BLD-SCNC: 140 MEQ/L (ref 135–144)
SPECIFIC GRAVITY UA: 1.02 (ref 1–1.03)
TOTAL PROTEIN: 6.7 G/DL (ref 6.3–8)
URINE REFLEX TO CULTURE: YES
UROBILINOGEN, URINE: 0.2 E.U./DL
WBC # BLD: 6.7 K/UL (ref 4.8–10.8)
WBC UA: ABNORMAL /HPF (ref 0–5)

## 2022-12-15 PROCEDURE — 96374 THER/PROPH/DIAG INJ IV PUSH: CPT

## 2022-12-15 PROCEDURE — 36415 COLL VENOUS BLD VENIPUNCTURE: CPT

## 2022-12-15 PROCEDURE — 81001 URINALYSIS AUTO W/SCOPE: CPT

## 2022-12-15 PROCEDURE — 6360000002 HC RX W HCPCS: Performed by: EMERGENCY MEDICINE

## 2022-12-15 PROCEDURE — 84703 CHORIONIC GONADOTROPIN ASSAY: CPT

## 2022-12-15 PROCEDURE — 87086 URINE CULTURE/COLONY COUNT: CPT

## 2022-12-15 PROCEDURE — 2580000003 HC RX 258: Performed by: EMERGENCY MEDICINE

## 2022-12-15 PROCEDURE — 83690 ASSAY OF LIPASE: CPT

## 2022-12-15 PROCEDURE — 85027 COMPLETE CBC AUTOMATED: CPT

## 2022-12-15 PROCEDURE — 99284 EMERGENCY DEPT VISIT MOD MDM: CPT

## 2022-12-15 PROCEDURE — 80053 COMPREHEN METABOLIC PANEL: CPT

## 2022-12-15 RX ORDER — METOCLOPRAMIDE HYDROCHLORIDE 5 MG/ML
10 INJECTION INTRAMUSCULAR; INTRAVENOUS ONCE
Status: COMPLETED | OUTPATIENT
Start: 2022-12-15 | End: 2022-12-15

## 2022-12-15 RX ORDER — 0.9 % SODIUM CHLORIDE 0.9 %
1000 INTRAVENOUS SOLUTION INTRAVENOUS ONCE
Status: COMPLETED | OUTPATIENT
Start: 2022-12-15 | End: 2022-12-15

## 2022-12-15 RX ORDER — CEPHALEXIN 500 MG/1
500 CAPSULE ORAL 3 TIMES DAILY
Qty: 21 CAPSULE | Refills: 0 | Status: SHIPPED | OUTPATIENT
Start: 2022-12-15 | End: 2022-12-22

## 2022-12-15 RX ADMIN — METOCLOPRAMIDE 10 MG: 5 INJECTION, SOLUTION INTRAMUSCULAR; INTRAVENOUS at 10:27

## 2022-12-15 RX ADMIN — SODIUM CHLORIDE 1000 ML: 9 INJECTION, SOLUTION INTRAVENOUS at 10:27

## 2022-12-15 RX ADMIN — SODIUM CHLORIDE 1000 ML: 9 INJECTION, SOLUTION INTRAVENOUS at 11:46

## 2022-12-15 ASSESSMENT — ENCOUNTER SYMPTOMS
ABDOMINAL PAIN: 0
NAUSEA: 1
VOMITING: 1
ABDOMINAL DISTENTION: 0
SHORTNESS OF BREATH: 0

## 2022-12-15 ASSESSMENT — PAIN - FUNCTIONAL ASSESSMENT
PAIN_FUNCTIONAL_ASSESSMENT: 0-10
PAIN_FUNCTIONAL_ASSESSMENT: NONE - DENIES PAIN

## 2022-12-15 ASSESSMENT — PAIN DESCRIPTION - DESCRIPTORS: DESCRIPTORS: DISCOMFORT

## 2022-12-15 ASSESSMENT — PAIN SCALES - GENERAL: PAINLEVEL_OUTOF10: 6

## 2022-12-15 ASSESSMENT — PAIN DESCRIPTION - PAIN TYPE: TYPE: ACUTE PAIN

## 2022-12-15 ASSESSMENT — PAIN DESCRIPTION - ONSET: ONSET: ON-GOING

## 2022-12-15 ASSESSMENT — PAIN DESCRIPTION - FREQUENCY: FREQUENCY: CONTINUOUS

## 2022-12-15 ASSESSMENT — PAIN DESCRIPTION - LOCATION: LOCATION: THROAT

## 2022-12-15 NOTE — ED PROVIDER NOTES
[address] [facility] ED  EMERGENCY DEPARTMENT ENCOUNTER      Pt Name: Ana Samuels  MRN: [id number]  [place] 1995  Date of evaluation: 12/15/2022  Provider: Tessa Hanks MD    CHIEF COMPLAINT       Chief Complaint   Patient presents with    Hematemesis     X 36 hours midwife told pt to go to the ED pt is 13 weeks pregnant. Pt is on a Zofran pump. HISTORY OF PRESENT ILLNESS   (Location/Symptom, Timing/Onset, Context/Setting, Quality, Duration, Modifying Factors, Severity)  Note limiting factors. Ana Samuels is a 28 y.o. female who presents to the emergency department via private vehicle for evaluation of vomiting. H/o anxiety, bipolar disorder.  first pregnancy born full-term without complications, vaginal delivery. Says that she sees  and has already had a reassuring ultrasound showing intrauterine pregnancy. Says that over the past 2 days she has had nausea and vomiting, unable to keep anything down. Currently on a Zofran pump. She says that after vomiting over the past couple of days, last night she noticed that there was specks of blood streaks in her vomiting, red. This was mixed with vomit. No anticoagulation use or traumatic injury. No suspicious food intake or sick contacts. She does not endorse any upper respiratory symptoms, abdominal pain, back pain, dysuria, passage of vaginal tissue/fluid/blood.     HPI    Nursing Notes were reviewed. REVIEW OF SYSTEMS    (2-9 systems for level 4, 10 or more for level 5)     Review of Systems   Constitutional:  Negative for fever. Respiratory:  Negative for shortness of breath. Cardiovascular:  Negative for chest pain. Gastrointestinal:  Positive for nausea and vomiting (blood streaks, intermittent). Negative for abdominal distention and abdominal pain. Genitourinary:  Negative for dysuria, flank pain, hematuria, vaginal bleeding and vaginal discharge. Skin:  Negative for pallor and rash.    Neurological: Negative for syncope. All other systems reviewed and are negative. Except as noted above the remainder of the review of systems was reviewed and negative.        PAST MEDICAL HISTORY     Past Medical History:   Diagnosis Date    Abnormal Pap smear of cervix     Bipolar and related disorder ([facility] [address]     Bipolar disorder ([facility] [address] 4/27/2022    HPV in female     PTSD (post-traumatic stress disorder)          SURGICAL HISTORY       Past Surgical History:   Procedure Laterality Date    CYST REMOVAL      DILATION AND CURETTAGE      TONSILLECTOMY      TYMPANOSTOMY TUBE PLACEMENT           CURRENT MEDICATIONS       Previous Medications    ALBUTEROL SULFATE  (90 BASE) MCG/ACT INHALER    Inhale 2 puffs into the lungs every 6 hours as needed for Wheezing or Shortness of Breath    CIPROFLOXACIN (CIPRO) 500 MG TABLET    TAKE 1 TABLET BY MOUTH TWICE DAILY FOR 7 DAYS    DOXYCYCLINE MONOHYDRATE (ADOXA) 100 MG TABLET    TAKE 1 TABLET BY MOUTH TWICE DAILY FOR 14 DAYS    FLUCONAZOLE (DIFLUCAN) 150 MG TABLET    TAKE 1 TABLET BY MOUTH 1 TIME ONLY FOR 1 DOSE    FLUOXETINE (PROZAC) 10 MG CAPSULE    Take 1 capsule by mouth daily    FLUTICASONE-SALMETEROL (ADVAIR DISKUS) 100-50 MCG/ACT AEPB DISKUS INHALER    Inhale 1 puff into the lungs 2 times daily Rinse after use    IBUPROFEN (ADVIL;MOTRIN) 800 MG TABLET    Take 1 tablet by mouth every 8 hours as needed for Pain    METRONIDAZOLE (FLAGYL) 500 MG TABLET    TAKE 1 TABLET BY MOUTH TWICE DAILY FOR 14 DAYS    ONDANSETRON (ZOFRAN-ODT) 4 MG DISINTEGRATING TABLET    DISSOLVE 1 TABLET ON THE TONGUE EVERY 6 HOURS FOR UP TO 7 DAYS AS NEEDED FOR NAUSEA OR VOMITING       ALLERGIES     Diphenhydramine and Hydrocodone-acetaminophen    FAMILY HISTORY       Family History   Problem Relation Age of Onset    Cervical Cancer Mother     Cancer Mother     Bipolar Disorder Mother     Anxiety Disorder Mother     Migraines Mother     Breast Cancer Neg Hx     Colon Cancer Neg Hx     Diabetes Neg Hx Eclampsia Neg Hx     Hypertension Neg Hx     Ovarian Cancer Neg Hx      Labor Neg Hx     Spont Abortions Neg Hx     Stroke Neg Hx           SOCIAL HISTORY       Social History     Socioeconomic History    Marital status: Single   Tobacco Use    Smoking status: Former     Types: Cigarettes    Smokeless tobacco: Never   Vaping Use    Vaping Use: Every day    Substances: Nicotine   Substance and Sexual Activity    Alcohol use: Yes     Comment: socially    Drug use: No    Sexual activity: Not Currently     Social Determinants of Health     Financial Resource Strain: Low Risk     Difficulty of Paying Living Expenses: Not hard at all   Food Insecurity: No Food Insecurity    Worried About Running Out of Food in the Last Year: Never true    Ran Out of Food in the Last Year: Never true   Transportation Needs: No Transportation Needs    Lack of Transportation (Medical): No    Lack of Transportation (Non-Medical): No       SCREENINGS         [place] Coma Scale  Eye Opening: Spontaneous  Best Verbal Response: Oriented  Best Motor Response: Obeys commands  [place] Coma Scale Score: 15                     CIWA Assessment  BP: 91/61  Heart Rate: 75                 PHYSICAL EXAM    (up to 7 for level 4, 8 or more for level 5)     ED Triage Vitals [12/15/22 0858]   BP Temp Temp Source Heart Rate Resp SpO2 Height Weight   106/68 98.7 °F (37.1 °C) Oral 89 16 100 % 5' 3\" (1.6 m) 117 lb (53.1 kg)       Physical Exam  Vitals reviewed. Constitutional:       General: She is not in acute distress. Appearance: She is not toxic-appearing or diaphoretic. HENT:      Head: Normocephalic and atraumatic. Nose: Nose normal.      Mouth/Throat:      Mouth: Mucous membranes are moist.   Eyes:      General: No scleral icterus. Cardiovascular:      Rate and Rhythm: Normal rate and regular rhythm. Pulmonary:      Effort: Pulmonary effort is normal.      Breath sounds: Normal breath sounds.    Abdominal:      General: There is no distension. Tenderness: There is no abdominal tenderness. There is no right CVA tenderness, left CVA tenderness, guarding or rebound. Hernia: No hernia is present. Comments: No appendiceal signs   Musculoskeletal:      Cervical back: No tenderness. Right lower leg: No edema. Left lower leg: No edema. Skin:     Capillary Refill: Capillary refill takes less than 2 seconds. Findings: No rash. Neurological:      Mental Status: She is alert and oriented to person, place, and time. Psychiatric:         Mood and Affect: Mood normal.       DIAGNOSTIC RESULTS     No indication for repeat US    Interpretation per the Radiologist below, if available at the time of this note:    No orders to display         ED BEDSIDE ULTRASOUND:   Performed by ED Physician - none    LABS:  Labs Reviewed   CBC - Abnormal; Notable for the following components:       Result Value    RBC 3.78 (*)     Hematocrit 36.1 (*)     MCV 95.4 (*)     MCH 32.1 (*)     All other components within normal limits   COMPREHENSIVE METABOLIC PANEL W/ REFLEX TO MG FOR LOW K - Abnormal; Notable for the following components:    Alkaline Phosphatase 37 (*)     All other components within normal limits   URINALYSIS WITH REFLEX TO CULTURE - Abnormal; Notable for the following components:    Clarity, UA CLOUDY (*)     Ketones, Urine TRACE (*)     Protein, UA TRACE (*)     Leukocyte Esterase, Urine MODERATE (*)     All other components within normal limits   MICROSCOPIC URINALYSIS - Abnormal; Notable for the following components:    Bacteria, UA MANY (*)     WBC, UA  (*)     All other components within normal limits   CULTURE, URINE   HCG, SERUM, QUALITATIVE   LIPASE       All other labs were within normal range or not returned as of this dictation.     EMERGENCY DEPARTMENT COURSE and DIFFERENTIAL DIAGNOSIS/MDM:   Vitals:    Vitals:    12/15/22 0858 12/15/22 1100 12/15/22 1130 12/15/22 1200   BP: 106/68 103/60 86/64 91/61   Pulse: 89 85 85 75   Resp: 16   16   Temp: 98.7 °F (37.1 °C)      TempSrc: Oral      SpO2: 100% 100% 100% 100%   Weight: 117 lb (53.1 kg)      Height: 5' 3\" (1.6 m)            Pt reports IUP, she seems reliable  No leukocytosis. Bacteriuria present. No blood in urine concerning for ureteral obstruction. No significant ketones in urine. No significant electrolyte derangements, acidemia or anion gap. Hemoglobin appropriate. No hematemesis here. Possible gastritis versus superficial mucosal irritation. No concern for significant GI bleed. MDM  Patient presents the emergency department with nausea and vomiting in pregnancy. Fetal heart tones reassuring. She reports history of chronically low blood pressure. Patient has failed a Zofran pump. Given Reglan and IV fluids. Afebrile. She has a nontender abdomen. History and physical not consistent with cholecystitis, appendicitis or other intra-abdominal surgical pathology. I doubt pyelonephritis. REASSESSMENT      Patient feeling much better. Tolerating p.o. No further vomiting. Requesting IV removal and discharge. CONSULTS:  None    PROCEDURES:  Unless otherwise noted below, none     Procedures    Delay 2/2 awaiting CMP results    FINAL IMPRESSION      1. Bacteriuria    2. Nausea/vomiting in pregnancy          DISPOSITION/PLAN   DISPOSITION Discharge - Pending Orders Complete 12/15/2022 12:17:59 PM      PATIENT REFERRED TO:  RENETTA Hernandez - CNP  [address] [facility]  [phone]          DISCHARGE MEDICATIONS:  New Prescriptions    CEPHALEXIN (KEFLEX) 500 MG CAPSULE    Take 1 capsule by mouth 3 times daily for 7 days     Controlled Substances Monitoring:     No flowsheet data found.     (Please note that portions of this note were completed with a voice recognition program.  Efforts were made to edit the dictations but occasionally words are mis-transcribed.)    Krishna Hughes MD (electronically signed)  Attending Emergency Physician            Tucker Khan MD  12/15/22 1873 Kaylee Zhu MD  12/15/22 7407

## 2022-12-15 NOTE — ED NOTES
Fetal heart tones noted to be 158 bpm. Dr. Sally Seaman notified.       Rebecca Valladares RN  12/15/22 1017

## 2022-12-15 NOTE — ED NOTES
Discharge instructions reviewed with patient. Medications reviewed and explained to patient. Patient denies any further questions at this time. Pt encouraged to make follow up appointments with PCP and any speciality referrals.         Hamilton Breaux RN  12/15/22 [id number]

## 2022-12-15 NOTE — DISCHARGE INSTRUCTIONS
Return for fever, worsening symptoms or concerns. Push fluids. Follow-up with obstetrician. Thank you.

## 2022-12-16 LAB — URINE CULTURE, ROUTINE: NORMAL

## 2023-01-01 ENCOUNTER — HOSPITAL ENCOUNTER (EMERGENCY)
Age: 28
Discharge: HOME | End: 2023-01-01
Attending: STUDENT IN AN ORGANIZED HEALTH CARE EDUCATION/TRAINING PROGRAM
Payer: COMMERCIAL

## 2023-01-01 VITALS
BODY MASS INDEX: 20.73 KG/M2 | DIASTOLIC BLOOD PRESSURE: 63 MMHG | WEIGHT: 117 LBS | HEIGHT: 63 IN | OXYGEN SATURATION: 98 % | RESPIRATION RATE: 16 BRPM | HEART RATE: 87 BPM | SYSTOLIC BLOOD PRESSURE: 98 MMHG | TEMPERATURE: 98.5 F

## 2023-01-01 DIAGNOSIS — O26.892 ABDOMINAL PAIN DURING PREGNANCY IN SECOND TRIMESTER: Primary | ICD-10-CM

## 2023-01-01 DIAGNOSIS — R10.9 ABDOMINAL PAIN DURING PREGNANCY IN SECOND TRIMESTER: Primary | ICD-10-CM

## 2023-01-01 DIAGNOSIS — N94.9 ROUND LIGAMENT PAIN: ICD-10-CM

## 2023-01-01 LAB
BILIRUBIN URINE: NEGATIVE
BLOOD, URINE: NEGATIVE
CLARITY: CLEAR
COLOR: YELLOW
GLUCOSE URINE: NEGATIVE MG/DL
KETONES, URINE: NEGATIVE MG/DL
LEUKOCYTE ESTERASE, URINE: NEGATIVE
NITRITE, URINE: NEGATIVE
PH UA: 6 (ref 5–9)
PROTEIN UA: NEGATIVE MG/DL
SPECIFIC GRAVITY UA: 1.01 (ref 1–1.03)
URINE REFLEX TO CULTURE: NORMAL
UROBILINOGEN, URINE: 0.2 E.U./DL

## 2023-01-01 PROCEDURE — 6370000000 HC RX 637 (ALT 250 FOR IP): Performed by: STUDENT IN AN ORGANIZED HEALTH CARE EDUCATION/TRAINING PROGRAM

## 2023-01-01 PROCEDURE — 81003 URINALYSIS AUTO W/O SCOPE: CPT

## 2023-01-01 PROCEDURE — 99283 EMERGENCY DEPT VISIT LOW MDM: CPT

## 2023-01-01 RX ORDER — ACETAMINOPHEN 500 MG
1000 TABLET ORAL EVERY 6 HOURS PRN
Qty: 60 TABLET | Refills: 0 | Status: SHIPPED | OUTPATIENT
Start: 2023-01-01

## 2023-01-01 RX ORDER — ACETAMINOPHEN 500 MG
1000 TABLET ORAL ONCE
Status: COMPLETED | OUTPATIENT
Start: 2023-01-01 | End: 2023-01-01

## 2023-01-01 RX ADMIN — ACETAMINOPHEN 1000 MG: 500 TABLET ORAL at 21:35

## 2023-01-01 ASSESSMENT — ENCOUNTER SYMPTOMS
VOMITING: 0
BACK PAIN: 0
ABDOMINAL PAIN: 1
RHINORRHEA: 0
EYE PAIN: 0
NAUSEA: 0
COUGH: 0
SHORTNESS OF BREATH: 0
SORE THROAT: 0
DIARRHEA: 0
CONSTIPATION: 0

## 2023-01-01 ASSESSMENT — PAIN SCALES - GENERAL: PAINLEVEL_OUTOF10: 6

## 2023-01-01 ASSESSMENT — PAIN DESCRIPTION - LOCATION: LOCATION: ABDOMEN

## 2023-01-01 ASSESSMENT — PAIN - FUNCTIONAL ASSESSMENT: PAIN_FUNCTIONAL_ASSESSMENT: 0-10

## 2023-01-01 ASSESSMENT — PAIN DESCRIPTION - DESCRIPTORS: DESCRIPTORS: CRAMPING

## 2023-01-01 ASSESSMENT — PAIN DESCRIPTION - ORIENTATION: ORIENTATION: LOWER

## 2023-01-02 NOTE — ED PROVIDER NOTES
[facility] ED  eMERGENCY dEPARTMENT eNCOUnter      Pt Name: Cha Sheikh  MRN: [id number]  [place] 1995  Date of evaluation: 2023  Provider: Brandyn Morgan MD      HISTORY OF PRESENT ILLNESS      Chief Complaint   Patient presents with    Abdominal Cramping     Lower abdominal cramping, advised by [id number] to come to the ER to get checked out          The history is provided by the Patient and SO. Cha Sheikh is a 28 y.o.  female at [address] with a PMH clinically significant for Bipolar d/o, Anxiety and PTSD presenting to the ED via PV c/o lower abdominal cramping/pain with initial onset earlier today associated with a little bit of anxiety regarding the baby. Reports that she called her Midwife who instructed her to come to the ED. SO reporting that the patient has been eating and drinking normally. Patient characterizing pain is aching, cramping in the lower abdomen consistent with prior menses. Not particularly severe. States cramping just will go away however, so wanted to get checked out. Initial onset just while sleeping. Denies any associated nausea, vomiting, changes in bowel movements, dysuria, hematuria, vaginal discharge or vaginal bleeding. Also denies any falls or abdominal trauma. No abnormal ingestions. Denies any other associated symptoms. States she did take antibiotics given to her at the ED last visit without complications. States she has otherwise been feeling well. Nausea and vomiting associated with first trimester have been improving. Did not try anything for symptomatic relief prior to arrival.      Per Chart Review: PMH as noted appreciated in chart review. Most recent evaluation in the ED on 12/15/2022 for intractable nausea and vomiting appreciated. Noted benign abdominal exam.  Labs significant for leukorrhea and bacteriuria. Discharged on Keflex for uti.     Ultrasound at [facility] on 2022 appreciated. Read Noting:  - In utero gestational sac with a live fetus  - Crown rump length is consistent with given DIPESH  - Fetal organ survey is within normal limits for the gestational age  - Nuchal translucency is within normal limits measuring 1.9 mm  - Normal appearing adnexa      REVIEW OF SYSTEMS       Review of Systems   Constitutional:  Negative for chills and fever. HENT:  Negative for rhinorrhea and sore throat. Eyes:  Negative for pain and visual disturbance. Respiratory:  Negative for cough and shortness of breath. Cardiovascular:  Negative for chest pain and palpitations. Gastrointestinal:  Positive for abdominal pain. Negative for constipation, diarrhea, nausea and vomiting. Genitourinary:  Negative for difficulty urinating, dysuria, flank pain, frequency, hematuria, vaginal bleeding and vaginal discharge. Musculoskeletal:  Negative for back pain and neck pain. Skin:  Negative for rash. Neurological:  Negative for weakness, numbness and headaches. Psychiatric/Behavioral:  The patient is nervous/anxious.       PAST MEDICAL HISTORY     Past Medical History:   Diagnosis Date    Abnormal Pap smear of cervix     Bipolar and related disorder ([facility] [address]     Bipolar disorder ([facility] [address] 2022    HPV in female     PTSD (post-traumatic stress disorder)        SURGICAL HISTORY       Past Surgical History:   Procedure Laterality Date    CYST REMOVAL      DILATION AND CURETTAGE      TONSILLECTOMY      TYMPANOSTOMY TUBE PLACEMENT         FAMILY HISTORY       Family History   Problem Relation Age of Onset    Cervical Cancer Mother     Cancer Mother     Bipolar Disorder Mother     Anxiety Disorder Mother     Migraines Mother     Breast Cancer Neg Hx     Colon Cancer Neg Hx     Diabetes Neg Hx     Eclampsia Neg Hx     Hypertension Neg Hx     Ovarian Cancer Neg Hx      Labor Neg Hx     Spont Abortions Neg Hx     Stroke Neg Hx        SOCIAL HISTORY       Social History     Socioeconomic History Marital status: Single     Spouse name: None    Number of children: None    Years of education: None    Highest education level: None   Tobacco Use    Smoking status: Former     Types: Cigarettes    Smokeless tobacco: Never   Vaping Use    Vaping Use: Every day    Substances: Nicotine   Substance and Sexual Activity    Alcohol use: Yes     Comment: socially    Drug use: No    Sexual activity: Not Currently     Social Determinants of Health     Financial Resource Strain: Low Risk     Difficulty of Paying Living Expenses: Not hard at all   Food Insecurity: No Food Insecurity    Worried About [facility] in the Last Year: Never true    [facility] in the Last Year: Never true   Transportation Needs: No Transportation Needs    Lack of Transportation (Medical): No    Lack of Transportation (Non-Medical):  No       CURRENT MEDICATIONS       Discharge Medication List as of 1/1/2023 10:39 PM        CONTINUE these medications which have NOT CHANGED    Details   ondansetron (ZOFRAN-ODT) 4 MG disintegrating tablet DISSOLVE 1 TABLET ON THE TONGUE EVERY 6 HOURS FOR UP TO 7 DAYS AS NEEDED FOR NAUSEA OR VOMITINGHistorical Med      metroNIDAZOLE (FLAGYL) 500 MG tablet TAKE 1 TABLET BY MOUTH TWICE DAILY FOR 14 DAYSHistorical Med      fluconazole (DIFLUCAN) 150 MG tablet TAKE 1 TABLET BY MOUTH 1 TIME ONLY FOR 1 DOSEHistorical Med      doxycycline monohydrate (ADOXA) 100 MG tablet TAKE 1 TABLET BY MOUTH TWICE DAILY FOR 14 DAYSHistorical Med      ciprofloxacin (CIPRO) 500 MG tablet TAKE 1 TABLET BY MOUTH TWICE DAILY FOR 7 DAYSHistorical Med      FLUoxetine (PROZAC) 10 MG capsule Take 1 capsule by mouth daily, Disp-30 capsule, R-3Normal      albuterol sulfate  (90 Base) MCG/ACT inhaler Inhale 2 puffs into the lungs every 6 hours as needed for Wheezing or Shortness of Breath, Disp-1 each, R-1Normal      fluticasone-salmeterol (ADVAIR DISKUS) 100-50 MCG/ACT AEPB diskus inhaler Inhale 1 puff into the lungs 2 times daily Rinse after use, Disp-1 each, R-2Normal      ibuprofen (ADVIL;MOTRIN) 800 MG tablet Take 1 tablet by mouth every 8 hours as needed for Pain, Disp-40 tablet, R-0Normal             ALLERGIES     Diphenhydramine and Hydrocodone-acetaminophen      PHYSICAL EXAM       ED Triage Vitals [01/01/23 2100]   BP Temp Temp src Heart Rate Resp SpO2 Height Weight   98/63 98.5 °F (36.9 °C) -- 87 16 98 % 5' 3\" (1.6 m) 117 lb (53.1 kg)       Physical Exam  Vitals and nursing note reviewed. Constitutional:       General: She is not in acute distress. HENT:      Head: Normocephalic and atraumatic. Mouth/Throat:      Mouth: Mucous membranes are moist.      Pharynx: Oropharynx is clear. Eyes:      Extraocular Movements: Extraocular movements intact. Pupils: Pupils are equal, round, and reactive to light. Cardiovascular:      Rate and Rhythm: Normal rate and regular rhythm. Pulses: Normal pulses. Heart sounds: Normal heart sounds. Pulmonary:      Effort: Pulmonary effort is normal. No respiratory distress. Breath sounds: Normal breath sounds. Abdominal:      General: There is no distension. Palpations: Abdomen is soft. Tenderness: There is no abdominal tenderness. There is no right CVA tenderness, left CVA tenderness, guarding or rebound. Comments: Gravid abdomen. Uterus palpable below the umbilicus. Musculoskeletal:         General: No tenderness. Cervical back: Normal range of motion and neck supple. Right lower leg: No edema. Left lower leg: No edema. Skin:     General: Skin is warm and dry. Capillary Refill: Capillary refill takes less than 2 seconds. Neurological:      General: No focal deficit present. Mental Status: She is alert and oriented to person, place, and time. Psychiatric:         Mood and Affect: Mood is anxious.          Behavior: Behavior normal.       MDM:   Chart Reviewed: PMH and additional information as noted in HPI obtained from chart review    Vitals:    Vitals:    01/01/23 2100   BP: 98/63   Pulse: 87   Resp: 16   Temp: 98.5 °F (36.9 °C)   SpO2: 98%   Weight: 117 lb (53.1 kg)   Height: 5' 3\" (1.6 m)       PROCEDURES:  Unless otherwise noted below, none  Procedures    LABS:  Labs Reviewed   URINALYSIS WITH REFLEX TO CULTURE       No orders to display       ED Course as of 01/01/23 2342   Sun Jan 01, 2023 2238 Nitrite, Urine: Negative [NA]   2238 Leukocyte Esterase, Urine: Negative [NA]      ED Course User Index  [NA] Andrea Mancini MD       29 y.o. female 28 y.o. Jamie Heaton female at [address] with a PMH clinically significant for Bipolar d/o, Anxiety and PTSD presenting to the ED via PV c/o lower abdominal cramping/pain with initial onset earlier today associated with a little bit of anxiety regarding the baby. Upon initial evaluation, Pt mildly anxious appearing, but otherwise Afebrile, HDS and in NAD. PE as noted above. Labs,  as noted above. Bedside US showing completed by and interpreted by myself showing single live IUP with active movement. Fetal heart rate of approximately 165 bpm.  No free intraperitoneal fluids appreciated. Given findings, clinical presentation most likely consistent w/ ligament pain in the setting of pregnancy just over 16 weeks gestational age. Although considered, patient denying any abdominal trauma or abuse. Patient also denying any other associated GI, urinary or vaginal complaints. Although considered obtaining further abdominal imaging, lower suspicion for acute appendicitis, TOA, torsion or other acute abdominal pathology given symptoms are bilateral and abdominal exam benign in the ED. Patient also with active pregnancy and with risk radiation injury to the fetus. Did obtain urinalysis given concern for UTI, but patient urine negative in the ED and recently treated. Lower suspicion for complications with pregnancy given no vaginal bleeding/discharge and bedside US with appropriate FHR. Given findings, administered acetaminophen for symptomatic relief. We will have the patient follow-up with her OB/GYN at the first available appointment and return to the ED if any new worsening symptoms. Pt was administered   Medications   acetaminophen (TYLENOL) tablet 1,000 mg (1,000 mg Oral Given 1/1/23 8645)       Plan: Discharge home in good condition with meds as noted below and instructions to follow up with [facility] OB[facility]. Pt stable and appropriate for further evaluation and management as an outpatient. Standard anticipatory guidance and strict return precautions given if any new or worsening symptoms. and Patient understanding and amenable to the POC. CRITICAL CARE TIME   Total CriticalCare time was 0 minutes, excluding separately reportable procedures. There was a high probability of clinically significant/life threatening deterioration in the patient's condition which required my urgent intervention. FINAL IMPRESSION      1. Abdominal pain during pregnancy in second trimester    2.  Round ligament pain          DISPOSITION/PLAN   DISPOSITION Decision To Discharge 01/01/2023 10:38:35 PM      Discharge Medication List as of 1/1/2023 10:39 PM        START taking these medications    Details   acetaminophen (TYLENOL) 500 MG tablet Take 2 tablets by mouth every 6 hours as needed for Pain or Fever, Disp-60 tablet, R-0Normal              MD Ellen Jara MD  01/01/23 5514

## 2023-01-02 NOTE — ED TRIAGE NOTES
PATIENT PRESENTS TO THE ER WITH COMPLAINTS OF LOWER ABDOMINAL PAIN   16 WEEKS PREGNANT  STATES THAT CRAMPING STARTED THIS AFTERNOON   DENIES BLEEDING OR SPOTTING  DENIES DISCHARGE  DENIES UTI SYMPTOMS   PATIENT HAS BEEN FEELING FETAL MOVEMENT   ADVISED BY MIDWIFE FROM  TO COME TO THE ER TO GET CHECKED OUT

## 2023-01-04 NOTE — PROGRESS NOTES
Patient seen in ED, has no OBGYN provider on file. Call and check if needs follow up for Abdominal Pain During Pregnancy In Second Trimester (Primary)  Round ligament pain and to schedule with our office.

## 2023-02-07 ENCOUNTER — TELEPHONE (OUTPATIENT)
Dept: FAMILY MEDICINE CLINIC | Age: 28
End: 2023-02-07

## 2023-02-07 NOTE — TELEPHONE ENCOUNTER
Patient is calling and stating that she is 21 weeks pregnant and her OB/GYN is not in the office. Patient is asking if it is OK for her to take Musinex fast max otc  it has Phenylephrine/hci in it    She has cough and congestion x 4 days.    Patient said she was tested for covid and was negative

## 2023-03-16 LAB
ERYTHROCYTE DISTRIBUTION WIDTH (RATIO) BY AUTOMATED COUNT: 12.9 % (ref 11.5–14.5)
ERYTHROCYTE MEAN CORPUSCULAR HEMOGLOBIN CONCENTRATION (G/DL) BY AUTOMATED: 32.5 G/DL (ref 32–36)
ERYTHROCYTE MEAN CORPUSCULAR VOLUME (FL) BY AUTOMATED COUNT: 100 FL (ref 80–100)
ERYTHROCYTES (10*6/UL) IN BLOOD BY AUTOMATED COUNT: 3.39 X10E12/L (ref 4–5.2)
GLUCOSE, 1 HR SCREEN, PREG: 53 MG/DL
HEMATOCRIT (%) IN BLOOD BY AUTOMATED COUNT: 33.8 % (ref 36–46)
HEMOGLOBIN (G/DL) IN BLOOD: 11 G/DL (ref 12–16)
LEUKOCYTES (10*3/UL) IN BLOOD BY AUTOMATED COUNT: 7.7 X10E9/L (ref 4.4–11.3)
PLATELETS (10*3/UL) IN BLOOD AUTOMATED COUNT: 178 X10E9/L (ref 150–450)

## 2023-03-17 LAB — SYPHILIS TOTAL AB: NONREACTIVE

## 2023-04-14 LAB
CLUE CELLS: NORMAL
NUGENT SCORE: 0
YEAST: NORMAL

## 2023-05-28 LAB — GROUP B STREP SCREEN: ABNORMAL

## 2023-08-30 LAB — CHORIOGONADOTROPIN (MIU/ML) IN SER/PLAS: <2 MIU/ML

## 2023-10-31 PROBLEM — O21.0: Status: ACTIVE | Noted: 2023-10-31

## 2023-10-31 RX ORDER — LORATADINE 10 MG/1
1 TABLET ORAL DAILY PRN
COMMUNITY
Start: 2023-02-08 | End: 2023-11-01 | Stop reason: ALTCHOICE

## 2023-10-31 RX ORDER — IBUPROFEN 600 MG/1
1 TABLET ORAL 4 TIMES DAILY
COMMUNITY
Start: 2019-09-12 | End: 2023-11-01 | Stop reason: ALTCHOICE

## 2023-10-31 RX ORDER — B-COMPLEX WITH VITAMIN C
1 TABLET ORAL DAILY
COMMUNITY
Start: 2023-03-16 | End: 2023-11-01 | Stop reason: ALTCHOICE

## 2023-10-31 RX ORDER — FAMOTIDINE 20 MG/1
1 TABLET, FILM COATED ORAL EVERY 12 HOURS
COMMUNITY
Start: 2023-04-27 | End: 2023-11-01 | Stop reason: ALTCHOICE

## 2023-10-31 RX ORDER — METRONIDAZOLE 7.5 MG/G
1 GEL VAGINAL NIGHTLY
COMMUNITY
Start: 2022-11-01 | End: 2023-11-01 | Stop reason: ALTCHOICE

## 2023-10-31 RX ORDER — ETONOGESTREL AND ETHINYL ESTRADIOL VAGINAL RING .015; .12 MG/D; MG/D
1 RING VAGINAL
COMMUNITY
End: 2023-11-01 | Stop reason: ALTCHOICE

## 2023-10-31 RX ORDER — ONDANSETRON HYDROCHLORIDE 8 MG/1
1 TABLET, FILM COATED ORAL EVERY 8 HOURS
COMMUNITY
End: 2023-11-01 | Stop reason: ALTCHOICE

## 2023-10-31 RX ORDER — DOCUSATE SODIUM 100 MG/1
1 CAPSULE, LIQUID FILLED ORAL 2 TIMES DAILY PRN
COMMUNITY
End: 2023-11-01 | Stop reason: ALTCHOICE

## 2023-10-31 RX ORDER — SODIUM CHLORIDE, SODIUM LACTATE, POTASSIUM CHLORIDE, CALCIUM CHLORIDE 600; 310; 30; 20 MG/100ML; MG/100ML; MG/100ML; MG/100ML
INJECTION, SOLUTION INTRAVENOUS
COMMUNITY
Start: 2022-11-25 | End: 2023-11-01 | Stop reason: ALTCHOICE

## 2023-11-01 ENCOUNTER — OFFICE VISIT (OUTPATIENT)
Dept: OBSTETRICS AND GYNECOLOGY | Facility: CLINIC | Age: 28
End: 2023-11-01
Payer: COMMERCIAL

## 2023-11-01 VITALS
BODY MASS INDEX: 18.82 KG/M2 | WEIGHT: 106.2 LBS | HEIGHT: 63 IN | DIASTOLIC BLOOD PRESSURE: 65 MMHG | SYSTOLIC BLOOD PRESSURE: 106 MMHG | HEART RATE: 81 BPM

## 2023-11-01 DIAGNOSIS — N89.8 VAGINAL ODOR: ICD-10-CM

## 2023-11-01 DIAGNOSIS — F41.9 ANXIETY: ICD-10-CM

## 2023-11-01 DIAGNOSIS — N89.8 VAGINAL DISCHARGE: ICD-10-CM

## 2023-11-01 DIAGNOSIS — Z01.419 WELL WOMAN EXAM WITH ROUTINE GYNECOLOGICAL EXAM: Primary | ICD-10-CM

## 2023-11-01 PROCEDURE — 99395 PREV VISIT EST AGE 18-39: CPT | Performed by: MIDWIFE

## 2023-11-01 PROCEDURE — 99214 OFFICE O/P EST MOD 30 MIN: CPT | Performed by: MIDWIFE

## 2023-11-01 PROCEDURE — 87205 SMEAR GRAM STAIN: CPT

## 2023-11-01 RX ORDER — ESCITALOPRAM OXALATE 10 MG/1
10 TABLET ORAL DAILY
Qty: 30 TABLET | Refills: 11 | Status: SHIPPED | OUTPATIENT
Start: 2023-11-01 | End: 2024-05-16 | Stop reason: ALTCHOICE

## 2023-11-01 ASSESSMENT — ENCOUNTER SYMPTOMS
CONSTITUTIONAL NEGATIVE: 1
ENDOCRINE NEGATIVE: 1
NERVOUS/ANXIOUS: 1
ALLERGIC/IMMUNOLOGIC NEGATIVE: 1
GASTROINTESTINAL NEGATIVE: 1
CARDIOVASCULAR NEGATIVE: 1
MUSCULOSKELETAL NEGATIVE: 1
EYES NEGATIVE: 1
RESPIRATORY NEGATIVE: 1
HEMATOLOGIC/LYMPHATIC NEGATIVE: 1
NEUROLOGICAL NEGATIVE: 1

## 2023-11-01 NOTE — PROGRESS NOTES
"Subjective   Kennedi Klein is a 27 y.o. female who is here for a routine annual exam. Has had 2 cycles since delivering her 6 month old daughter. H/O irregular cycles. Dysmenorrhea:none. Cyclic symptoms include none. No intermenstrual bleeding, spotting. Reports she is currently experiencing vaginal discharge with odor.    Current contraception: none, desires another pregnancy  History of abnormal Pap smear: no  Family history of uterine or ovarian cancer: no  Regular self breast exam: no  History of abnormal mammogram: no  Family history of breast cancer: no  History of abnormal lipids: no  Anxiety/Depression: Yes to both. Not on meds but interested in starting. Established with therapist.    OB History    No obstetric history on file.        Patient's last menstrual period was 10/25/2023 (exact date).       Review of Systems   Constitutional: Negative.    HENT: Negative.     Eyes: Negative.    Respiratory: Negative.     Cardiovascular: Negative.    Gastrointestinal: Negative.    Endocrine: Negative.    Genitourinary:  Positive for vaginal discharge.   Musculoskeletal: Negative.    Skin: Negative.    Allergic/Immunologic: Negative.    Neurological: Negative.    Hematological: Negative.    Psychiatric/Behavioral:  The patient is nervous/anxious.    All other systems reviewed and are negative.      Objective   /65 (BP Location: Left arm, Patient Position: Sitting, BP Cuff Size: Large adult)   Pulse 81   Ht 1.6 m (5' 3\")   Wt 48.2 kg (106 lb 3.2 oz)   LMP 10/25/2023 (Exact Date)   BMI 18.81 kg/m²     General:   alert and oriented, in no acute distress, moderately anxious, appears stated age, and cooperative   Heart: regular rate and rhythm, S1, S2 normal, no murmur, click, rub or gallop   Lungs: clear to auscultation bilaterally and normal percussion bilaterally   Abdomen: soft, non-tender, without masses or organomegaly   Vulva: normal, Bartholin's, Urethra, Pojoaque's normal   Vagina: normal mucosa, normal " discharge   Cervix: no cervical motion tenderness   Uterus: normal size   Adnexa: normal adnexa     Assessment/Plan   Annual gynecologic exam  H/O thyroid disorder  Anxiety and depression    P: Reviewed BP, weight      Vaginitis      PNVs reordered      TSH with reflex T4      Discussed SINGLETARY, resumption of cycles, charting/following her cycles      Lexapro initiated      F/U with established therapist      Will follow up per results      RTO for annual and sooner PRN

## 2023-11-02 DIAGNOSIS — B96.89 BACTERIAL VAGINITIS: Primary | ICD-10-CM

## 2023-11-02 DIAGNOSIS — N76.0 BACTERIAL VAGINITIS: Primary | ICD-10-CM

## 2023-11-02 LAB
CLUE CELLS VAG LPF-#/AREA: PRESENT /[LPF]
NUGENT SCORE: 7
YEAST VAG WET PREP-#/AREA: ABNORMAL

## 2023-11-02 RX ORDER — METRONIDAZOLE 500 MG/1
500 TABLET ORAL 2 TIMES DAILY
Qty: 14 TABLET | Refills: 0 | Status: SHIPPED | OUTPATIENT
Start: 2023-11-02 | End: 2023-11-09

## 2023-12-07 ENCOUNTER — LAB (OUTPATIENT)
Dept: LAB | Facility: LAB | Age: 28
End: 2023-12-07
Payer: COMMERCIAL

## 2023-12-07 DIAGNOSIS — Z01.419 WELL WOMAN EXAM WITH ROUTINE GYNECOLOGICAL EXAM: ICD-10-CM

## 2023-12-07 DIAGNOSIS — F41.9 ANXIETY: ICD-10-CM

## 2023-12-07 LAB — TSH SERPL-ACNC: 1.99 MIU/L (ref 0.44–3.98)

## 2023-12-07 PROCEDURE — 84443 ASSAY THYROID STIM HORMONE: CPT

## 2023-12-07 PROCEDURE — 36415 COLL VENOUS BLD VENIPUNCTURE: CPT

## 2024-01-03 ENCOUNTER — APPOINTMENT (OUTPATIENT)
Dept: PRIMARY CARE | Facility: CLINIC | Age: 29
End: 2024-01-03
Payer: COMMERCIAL

## 2024-01-09 ENCOUNTER — OFFICE VISIT (OUTPATIENT)
Dept: FAMILY MEDICINE CLINIC | Age: 29
End: 2024-01-09
Payer: COMMERCIAL

## 2024-01-09 VITALS
RESPIRATION RATE: 16 BRPM | OXYGEN SATURATION: 100 % | TEMPERATURE: 97.6 F | BODY MASS INDEX: 16.9 KG/M2 | DIASTOLIC BLOOD PRESSURE: 58 MMHG | WEIGHT: 99 LBS | SYSTOLIC BLOOD PRESSURE: 100 MMHG | HEIGHT: 64 IN | HEART RATE: 73 BPM

## 2024-01-09 DIAGNOSIS — R63.4 WEIGHT LOSS, UNINTENTIONAL: ICD-10-CM

## 2024-01-09 DIAGNOSIS — R53.83 FATIGUE, UNSPECIFIED TYPE: ICD-10-CM

## 2024-01-09 DIAGNOSIS — F31.75 BIPOLAR DISORDER, IN PARTIAL REMISSION, MOST RECENT EPISODE DEPRESSED (HCC): ICD-10-CM

## 2024-01-09 DIAGNOSIS — R63.4 WEIGHT LOSS, UNINTENTIONAL: Primary | ICD-10-CM

## 2024-01-09 LAB
ALBUMIN SERPL-MCNC: 4.8 G/DL (ref 3.5–4.6)
ALP SERPL-CCNC: 90 U/L (ref 40–130)
ALT SERPL-CCNC: 10 U/L (ref 0–33)
ANION GAP SERPL CALCULATED.3IONS-SCNC: 14 MEQ/L (ref 9–15)
AST SERPL-CCNC: 15 U/L (ref 0–35)
BASOPHILS # BLD: 0 K/UL (ref 0–0.2)
BASOPHILS NFR BLD: 0.2 %
BILIRUB SERPL-MCNC: 0.6 MG/DL (ref 0.2–0.7)
BILIRUB UR QL STRIP: NEGATIVE
BUN SERPL-MCNC: 20 MG/DL (ref 6–20)
CALCIUM SERPL-MCNC: 9.4 MG/DL (ref 8.5–9.9)
CHLORIDE SERPL-SCNC: 103 MEQ/L (ref 95–107)
CHOLEST SERPL-MCNC: 146 MG/DL (ref 0–199)
CLARITY UR: CLEAR
CO2 SERPL-SCNC: 23 MEQ/L (ref 20–31)
COLOR UR: YELLOW
CREAT SERPL-MCNC: 0.66 MG/DL (ref 0.5–0.9)
CRP SERPL HS-MCNC: <3 MG/L (ref 0–5)
EOSINOPHIL # BLD: 0.1 K/UL (ref 0–0.7)
EOSINOPHIL NFR BLD: 2.1 %
ERYTHROCYTE [DISTWIDTH] IN BLOOD BY AUTOMATED COUNT: 11.9 % (ref 11.5–14.5)
ERYTHROCYTE [SEDIMENTATION RATE] IN BLOOD BY WESTERGREN METHOD: 3 MM (ref 0–20)
GLOBULIN SER CALC-MCNC: 2.6 G/DL (ref 2.3–3.5)
GLUCOSE SERPL-MCNC: 77 MG/DL (ref 70–99)
GLUCOSE UR STRIP-MCNC: NEGATIVE MG/DL
HBA1C MFR BLD: 5.1 % (ref 4.8–5.9)
HCT VFR BLD AUTO: 40.9 % (ref 37–47)
HDLC SERPL-MCNC: 69 MG/DL (ref 40–59)
HGB BLD-MCNC: 13.6 G/DL (ref 12–16)
HGB UR QL STRIP: NEGATIVE
KETONES UR STRIP-MCNC: NEGATIVE MG/DL
LDLC SERPL CALC-MCNC: 69 MG/DL (ref 0–129)
LEUKOCYTE ESTERASE UR QL STRIP: NEGATIVE
LYMPHOCYTES # BLD: 2 K/UL (ref 1–4.8)
LYMPHOCYTES NFR BLD: 34.6 %
MCH RBC QN AUTO: 31.4 PG (ref 27–31.3)
MCHC RBC AUTO-ENTMCNC: 33.3 % (ref 33–37)
MCV RBC AUTO: 94.5 FL (ref 79.4–94.8)
MONOCYTES # BLD: 0.4 K/UL (ref 0.2–0.8)
MONOCYTES NFR BLD: 7.6 %
NEUTROPHILS # BLD: 3.2 K/UL (ref 1.4–6.5)
NEUTS SEG NFR BLD: 55.3 %
NITRITE UR QL STRIP: NEGATIVE
PH UR STRIP: 5.5 [PH] (ref 5–9)
PLATELET # BLD AUTO: 255 K/UL (ref 130–400)
POTASSIUM SERPL-SCNC: 4.3 MEQ/L (ref 3.4–4.9)
PROT SERPL-MCNC: 7.4 G/DL (ref 6.3–8)
PROT UR STRIP-MCNC: NEGATIVE MG/DL
RBC # BLD AUTO: 4.33 M/UL (ref 4.2–5.4)
SODIUM SERPL-SCNC: 140 MEQ/L (ref 135–144)
SP GR UR STRIP: 1.02 (ref 1–1.03)
TRIGL SERPL-MCNC: 42 MG/DL (ref 0–150)
URINE REFLEX TO CULTURE: NORMAL
UROBILINOGEN UR STRIP-ACNC: 0.2 E.U./DL
WBC # BLD AUTO: 5.8 K/UL (ref 4.8–10.8)

## 2024-01-09 PROCEDURE — G8419 CALC BMI OUT NRM PARAM NOF/U: HCPCS | Performed by: NURSE PRACTITIONER

## 2024-01-09 PROCEDURE — 1036F TOBACCO NON-USER: CPT | Performed by: NURSE PRACTITIONER

## 2024-01-09 PROCEDURE — G8484 FLU IMMUNIZE NO ADMIN: HCPCS | Performed by: NURSE PRACTITIONER

## 2024-01-09 PROCEDURE — 99214 OFFICE O/P EST MOD 30 MIN: CPT | Performed by: NURSE PRACTITIONER

## 2024-01-09 PROCEDURE — G8427 DOCREV CUR MEDS BY ELIG CLIN: HCPCS | Performed by: NURSE PRACTITIONER

## 2024-01-09 RX ORDER — ESCITALOPRAM OXALATE 5 MG/1
5 TABLET ORAL DAILY
Qty: 30 TABLET | Refills: 2 | Status: SHIPPED | OUTPATIENT
Start: 2024-01-09

## 2024-01-09 SDOH — ECONOMIC STABILITY: HOUSING INSECURITY
IN THE LAST 12 MONTHS, WAS THERE A TIME WHEN YOU DID NOT HAVE A STEADY PLACE TO SLEEP OR SLEPT IN A SHELTER (INCLUDING NOW)?: NO

## 2024-01-09 SDOH — ECONOMIC STABILITY: FOOD INSECURITY: WITHIN THE PAST 12 MONTHS, THE FOOD YOU BOUGHT JUST DIDN'T LAST AND YOU DIDN'T HAVE MONEY TO GET MORE.: NEVER TRUE

## 2024-01-09 SDOH — ECONOMIC STABILITY: FOOD INSECURITY: WITHIN THE PAST 12 MONTHS, YOU WORRIED THAT YOUR FOOD WOULD RUN OUT BEFORE YOU GOT MONEY TO BUY MORE.: NEVER TRUE

## 2024-01-09 SDOH — ECONOMIC STABILITY: INCOME INSECURITY: HOW HARD IS IT FOR YOU TO PAY FOR THE VERY BASICS LIKE FOOD, HOUSING, MEDICAL CARE, AND HEATING?: NOT HARD AT ALL

## 2024-01-09 ASSESSMENT — PATIENT HEALTH QUESTIONNAIRE - PHQ9
6. FEELING BAD ABOUT YOURSELF - OR THAT YOU ARE A FAILURE OR HAVE LET YOURSELF OR YOUR FAMILY DOWN: SEVERAL DAYS
SUM OF ALL RESPONSES TO PHQ QUESTIONS 1-9: 13
1. LITTLE INTEREST OR PLEASURE IN DOING THINGS: 1
2. FEELING DOWN, DEPRESSED OR HOPELESS: SEVERAL DAYS
SUM OF ALL RESPONSES TO PHQ QUESTIONS 1-9: 13
SUM OF ALL RESPONSES TO PHQ QUESTIONS 1-9: 13
8. MOVING OR SPEAKING SO SLOWLY THAT OTHER PEOPLE COULD HAVE NOTICED. OR THE OPPOSITE - BEING SO FIDGETY OR RESTLESS THAT YOU HAVE BEEN MOVING AROUND A LOT MORE THAN USUAL: NOT AT ALL
7. TROUBLE CONCENTRATING ON THINGS, SUCH AS READING THE NEWSPAPER OR WATCHING TELEVISION: SEVERAL DAYS
SUM OF ALL RESPONSES TO PHQ QUESTIONS 1-9: 13
9. THOUGHTS THAT YOU WOULD BE BETTER OFF DEAD, OR OF HURTING YOURSELF: NOT AT ALL
5. POOR APPETITE OR OVEREATING: 3
6. FEELING BAD ABOUT YOURSELF - OR THAT YOU ARE A FAILURE OR HAVE LET YOURSELF OR YOUR FAMILY DOWN: 1
1. LITTLE INTEREST OR PLEASURE IN DOING THINGS: SEVERAL DAYS
SUM OF ALL RESPONSES TO PHQ QUESTIONS 1-9: 13
4. FEELING TIRED OR HAVING LITTLE ENERGY: 3
10. IF YOU CHECKED OFF ANY PROBLEMS, HOW DIFFICULT HAVE THESE PROBLEMS MADE IT FOR YOU TO DO YOUR WORK, TAKE CARE OF THINGS AT HOME, OR GET ALONG WITH OTHER PEOPLE: VERY DIFFICULT
2. FEELING DOWN, DEPRESSED OR HOPELESS: 1
4. FEELING TIRED OR HAVING LITTLE ENERGY: NEARLY EVERY DAY
7. TROUBLE CONCENTRATING ON THINGS, SUCH AS READING THE NEWSPAPER OR WATCHING TELEVISION: 1
9. THOUGHTS THAT YOU WOULD BE BETTER OFF DEAD, OR OF HURTING YOURSELF: 0
10. IF YOU CHECKED OFF ANY PROBLEMS, HOW DIFFICULT HAVE THESE PROBLEMS MADE IT FOR YOU TO DO YOUR WORK, TAKE CARE OF THINGS AT HOME, OR GET ALONG WITH OTHER PEOPLE: 2
3. TROUBLE FALLING OR STAYING ASLEEP: NEARLY EVERY DAY
8. MOVING OR SPEAKING SO SLOWLY THAT OTHER PEOPLE COULD HAVE NOTICED. OR THE OPPOSITE, BEING SO FIGETY OR RESTLESS THAT YOU HAVE BEEN MOVING AROUND A LOT MORE THAN USUAL: 0
5. POOR APPETITE OR OVEREATING: NEARLY EVERY DAY
3. TROUBLE FALLING OR STAYING ASLEEP: 3
SUM OF ALL RESPONSES TO PHQ9 QUESTIONS 1 & 2: 2

## 2024-01-10 LAB
FOLATE: 7.9 NG/ML
HEPATITIS C ANTIBODY: NONREACTIVE
HIV AG/AB: NONREACTIVE
VITAMIN B-12: 413 PG/ML (ref 232–1245)
VITAMIN D 25-HYDROXY: 35.4 NG/ML

## 2024-01-10 ASSESSMENT — ENCOUNTER SYMPTOMS
SORE THROAT: 0
COUGH: 0
COLOR CHANGE: 0
TROUBLE SWALLOWING: 0
CHEST TIGHTNESS: 0
ANAL BLEEDING: 0
ABDOMINAL DISTENTION: 0
BLOOD IN STOOL: 0
RECTAL PAIN: 0
VOMITING: 0
CONSTIPATION: 0
ABDOMINAL PAIN: 0
DIARRHEA: 0
WHEEZING: 0
NAUSEA: 0
SHORTNESS OF BREATH: 0

## 2024-01-10 NOTE — PROGRESS NOTES
Subjective:     Patient ID: Melly Monte is a 28 y.o. female who presentstoday for:  Chief Complaint   Patient presents with    Weight Loss     Patient is here with c/o losing weight. She is currently breastfeeding, eating a lot, working out and can't keep her weight up.        Weight Loss  This is a new problem. The current episode started more than 1 month ago (~17lb). Associated symptoms include fatigue. Pertinent negatives include no abdominal pain, arthralgias, chest pain, chills, congestion, coughing, diaphoresis, fever, headaches, joint swelling, myalgias, nausea, rash, sore throat, vomiting or weakness. Exacerbated by: depression, breastfeeding. Treatments tried: increase calories specifically protiens and fats. The treatment provided no relief.       Past Medical History:   Diagnosis Date    Abnormal Pap smear of cervix     Bipolar and related disorder (HCC)     Bipolar disorder (HCC) 2022    HPV in female     PTSD (post-traumatic stress disorder)      No current outpatient medications on file prior to visit.     Current Facility-Administered Medications on File Prior to Visit   Medication Dose Route Frequency Provider Last Rate Last Admin    levonorgestrel (MIRENA) IUD 52 mg 1 each  1 each IntraUTERine Once Samantha Saenz MD   1 each at 20 1119     Past Surgical History:   Procedure Laterality Date    CYST REMOVAL      DILATION AND CURETTAGE      TONSILLECTOMY      TYMPANOSTOMY TUBE PLACEMENT          Family History   Problem Relation Age of Onset    Cervical Cancer Mother     Cancer Mother     Bipolar Disorder Mother     Anxiety Disorder Mother     Migraines Mother     Breast Cancer Neg Hx     Colon Cancer Neg Hx     Diabetes Neg Hx     Eclampsia Neg Hx     Hypertension Neg Hx     Ovarian Cancer Neg Hx      Labor Neg Hx     Spont Abortions Neg Hx     Stroke Neg Hx      Social History     Socioeconomic History    Marital status: Single     Spouse name: Not on file    Number of

## 2024-01-23 ENCOUNTER — TELEMEDICINE (OUTPATIENT)
Dept: FAMILY MEDICINE CLINIC | Age: 29
End: 2024-01-23
Payer: COMMERCIAL

## 2024-01-23 DIAGNOSIS — F41.9 ANXIETY: ICD-10-CM

## 2024-01-23 DIAGNOSIS — F31.75 BIPOLAR DISORDER, IN PARTIAL REMISSION, MOST RECENT EPISODE DEPRESSED (HCC): Primary | ICD-10-CM

## 2024-01-23 PROCEDURE — G8427 DOCREV CUR MEDS BY ELIG CLIN: HCPCS | Performed by: NURSE PRACTITIONER

## 2024-01-23 PROCEDURE — 99214 OFFICE O/P EST MOD 30 MIN: CPT | Performed by: NURSE PRACTITIONER

## 2024-01-23 NOTE — PROGRESS NOTES
Melly Monte (:  1995) is a Established patient, presenting virtually for evaluation of the following:    Assessment & Plan   Below is the assessment and plan developed based on review of pertinent history, physical exam, labs, studies, and medications.  1. Bipolar disorder, in partial remission, most recent episode depressed (HCC)  -     sertraline (ZOLOFT) 50 MG tablet; Take 1 tablet by mouth daily, Disp-30 tablet, R-2Normal  2. Anxiety  -     sertraline (ZOLOFT) 50 MG tablet; Take 1 tablet by mouth daily, Disp-30 tablet, R-2Normal    Return in about 4 weeks (around 2024).       Subjective   HPI  Pt for f/u on depression and weight loss  Had labs and cxr done that were essentially normal  Patient stopped lexapro due adverse effects    Review of Systems   Constitutional:  Positive for fatigue and unexpected weight change. Negative for activity change, appetite change, chills, diaphoresis and fever.   HENT:  Negative for congestion, sore throat and trouble swallowing.    Eyes:  Negative for visual disturbance.   Respiratory:  Negative for cough, chest tightness, shortness of breath and wheezing.    Cardiovascular:  Negative for chest pain, palpitations and leg swelling.   Gastrointestinal:  Negative for abdominal distention, abdominal pain, anal bleeding, blood in stool, constipation, diarrhea, nausea, rectal pain and vomiting.   Endocrine: Negative for polydipsia, polyphagia and polyuria.   Genitourinary:  Negative for difficulty urinating.   Musculoskeletal:  Negative for arthralgias, gait problem, joint swelling and myalgias.   Skin:  Negative for color change and rash.   Neurological:  Negative for dizziness, syncope, weakness, light-headedness and headaches.   Psychiatric/Behavioral:  Positive for dysphoric mood. Negative for agitation, confusion and sleep disturbance. The patient is not nervous/anxious.           Objective   Patient-Reported Vitals  No data recorded       Physical

## 2024-01-29 ASSESSMENT — ENCOUNTER SYMPTOMS
COUGH: 0
CONSTIPATION: 0
VOMITING: 0
NAUSEA: 0
SHORTNESS OF BREATH: 0
ANAL BLEEDING: 0
DIARRHEA: 0
COLOR CHANGE: 0
TROUBLE SWALLOWING: 0
ABDOMINAL PAIN: 0
SORE THROAT: 0
BLOOD IN STOOL: 0
WHEEZING: 0
CHEST TIGHTNESS: 0
RECTAL PAIN: 0
ABDOMINAL DISTENTION: 0

## 2024-03-04 ENCOUNTER — TELEMEDICINE (OUTPATIENT)
Dept: FAMILY MEDICINE CLINIC | Age: 29
End: 2024-03-04
Payer: COMMERCIAL

## 2024-03-04 DIAGNOSIS — F31.32 BIPOLAR AFFECTIVE DISORDER, CURRENTLY DEPRESSED, MODERATE (HCC): Primary | ICD-10-CM

## 2024-03-04 DIAGNOSIS — F33.1 MODERATE EPISODE OF RECURRENT MAJOR DEPRESSIVE DISORDER (HCC): ICD-10-CM

## 2024-03-04 PROCEDURE — 99214 OFFICE O/P EST MOD 30 MIN: CPT | Performed by: NURSE PRACTITIONER

## 2024-03-04 PROCEDURE — G8427 DOCREV CUR MEDS BY ELIG CLIN: HCPCS | Performed by: NURSE PRACTITIONER

## 2024-03-04 RX ORDER — MIRTAZAPINE 15 MG/1
15 TABLET, FILM COATED ORAL NIGHTLY
Qty: 30 TABLET | Refills: 3 | Status: SHIPPED | OUTPATIENT
Start: 2024-03-04

## 2024-03-04 NOTE — PROGRESS NOTES
Melly Monte (:  1995) is a Established patient, presenting virtually for evaluation of the following:    Assessment & Plan   Below is the assessment and plan developed based on review of pertinent history, physical exam, labs, studies, and medications.  1. Bipolar affective disorder, currently depressed, moderate (HCC)  -     William Cummings APRN - CNP, [facility], [place]  2. Moderate episode of recurrent major depressive disorder (HCC)  -     mirtazapine (REMERON) 15 MG tablet; Take 1 tablet by mouth nightly, Disp-30 tablet, R-3Normal  -     William Cummings APRN - CNP, [facility], [place]    Return in about 4 weeks (around 2024).       Subjective   HPI  Review of Systems   Constitutional:  Positive for fatigue and unexpected weight change. Negative for activity change, appetite change, chills, diaphoresis and fever.   HENT:  Negative for congestion, sore throat and trouble swallowing.    Eyes:  Negative for visual disturbance.   Respiratory:  Negative for cough, chest tightness, shortness of breath and wheezing.    Cardiovascular:  Negative for chest pain, palpitations and leg swelling.   Gastrointestinal:  Negative for abdominal distention, abdominal pain, anal bleeding, blood in stool, constipation, diarrhea, nausea, rectal pain and vomiting.   Endocrine: Negative for polydipsia, polyphagia and polyuria.   Genitourinary:  Negative for difficulty urinating.   Musculoskeletal:  Negative for arthralgias, gait problem, joint swelling and myalgias.   Skin:  Negative for color change and rash.   Neurological:  Negative for dizziness, syncope, weakness, light-headedness and headaches.   Psychiatric/Behavioral:  Positive for dysphoric mood and sleep disturbance. Negative for agitation, confusion, self-injury and suicidal ideas. The patient is nervous/anxious.           Objective   Patient-Reported Vitals  No data recorded     Physical Exam  Constitutional:       General:

## 2024-03-08 ASSESSMENT — ENCOUNTER SYMPTOMS
CONSTIPATION: 0
VOMITING: 0
SHORTNESS OF BREATH: 0
SORE THROAT: 0
BLOOD IN STOOL: 0
DIARRHEA: 0
RECTAL PAIN: 0
COLOR CHANGE: 0
WHEEZING: 0
ABDOMINAL PAIN: 0
CHEST TIGHTNESS: 0
TROUBLE SWALLOWING: 0
ANAL BLEEDING: 0
NAUSEA: 0
COUGH: 0
ABDOMINAL DISTENTION: 0

## 2024-03-18 ENCOUNTER — TELEMEDICINE (OUTPATIENT)
Dept: FAMILY MEDICINE CLINIC | Age: 29
End: 2024-03-18
Payer: COMMERCIAL

## 2024-03-18 DIAGNOSIS — F33.1 MODERATE EPISODE OF RECURRENT MAJOR DEPRESSIVE DISORDER (HCC): ICD-10-CM

## 2024-03-18 DIAGNOSIS — L70.0 CYSTIC ACNE VULGARIS: Primary | ICD-10-CM

## 2024-03-18 DIAGNOSIS — F31.77 BIPOLAR DISORDER, IN PARTIAL REMISSION, MOST RECENT EPISODE MIXED (HCC): ICD-10-CM

## 2024-03-18 DIAGNOSIS — J45.30 MILD PERSISTENT ASTHMA WITHOUT COMPLICATION: ICD-10-CM

## 2024-03-18 PROCEDURE — 99214 OFFICE O/P EST MOD 30 MIN: CPT | Performed by: NURSE PRACTITIONER

## 2024-03-18 PROCEDURE — G8427 DOCREV CUR MEDS BY ELIG CLIN: HCPCS | Performed by: NURSE PRACTITIONER

## 2024-03-18 RX ORDER — CLINDAMYCIN AND BENZOYL PEROXIDE 10; 50 MG/G; MG/G
GEL TOPICAL
Qty: 50 G | Refills: 0 | Status: SHIPPED | OUTPATIENT
Start: 2024-03-18

## 2024-03-18 RX ORDER — DOXYCYCLINE HYCLATE 100 MG
100 TABLET ORAL 2 TIMES DAILY
Qty: 20 TABLET | Refills: 0 | Status: SHIPPED | OUTPATIENT
Start: 2024-03-18 | End: 2024-03-28

## 2024-03-18 ASSESSMENT — ENCOUNTER SYMPTOMS
COLOR CHANGE: 0
ABDOMINAL PAIN: 0
CONSTIPATION: 0
NAUSEA: 0
TROUBLE SWALLOWING: 0
DIARRHEA: 0
RECTAL PAIN: 0
SHORTNESS OF BREATH: 0
BLOOD IN STOOL: 0
VOMITING: 0
ANAL BLEEDING: 0
WHEEZING: 0
SORE THROAT: 0
ABDOMINAL DISTENTION: 0

## 2024-03-18 NOTE — PROGRESS NOTES
Melly Monte (:  1995) is a Established patient, presenting virtually for evaluation of the following:    Assessment & Plan   Below is the assessment and plan developed based on review of pertinent history, physical exam, labs, studies, and medications.  1. Cystic acne vulgaris  -     doxycycline hyclate (VIBRA-TABS) 100 MG tablet; Take 1 tablet by mouth 2 times daily for 10 days Take with food, Disp-20 tablet, R-0Normal  -     clindamycin-benzoyl peroxide (BENZACLIN) 1-5 % gel; Apply topically 2 times daily., Disp-50 g, R-0, Normal  2. Bipolar disorder, in partial remission, most recent episode mixed (HCC)  3. Moderate episode of recurrent major depressive disorder (HCC)  Symptoms improving with remeron.  Weight is increased 3lb.  Appetite improved.  Also states depression improving.  Sleeping well.  Has appointment scheduled with Psychiatrist   4. Mild persistent asthma without complication  Assessment & Plan:   Well-controlled, continue current medications and continue current treatment plan      No follow-ups on file.       Subjective   DepressionPatient presents with the following symptoms: nervousness/anxiety.  Patient is not experiencing: confusion, palpitations, shortness of breath and suicidal ideas.      Bipolar  The primary symptoms include dysphoric mood.   Additional symptoms of the illness include unexpected weight change and fatigue. Additional symptoms of the illness do not include appetite change, agitation, headaches or abdominal pain.     Review of Systems   Constitutional:  Positive for fatigue and unexpected weight change. Negative for activity change, appetite change, chills, diaphoresis and fever.        Weight improving since starting remeron   HENT:  Negative for congestion, sore throat and trouble swallowing.    Eyes:  Negative for visual disturbance.   Respiratory:  Negative for cough, chest tightness, shortness of breath and wheezing.    Cardiovascular:  Negative for

## 2024-05-16 ENCOUNTER — INITIAL PRENATAL (OUTPATIENT)
Dept: OBSTETRICS AND GYNECOLOGY | Facility: CLINIC | Age: 29
End: 2024-05-16
Payer: COMMERCIAL

## 2024-05-16 ENCOUNTER — LAB (OUTPATIENT)
Dept: LAB | Facility: LAB | Age: 29
End: 2024-05-16
Payer: COMMERCIAL

## 2024-05-16 VITALS — SYSTOLIC BLOOD PRESSURE: 102 MMHG | WEIGHT: 121.6 LBS | DIASTOLIC BLOOD PRESSURE: 60 MMHG | BODY MASS INDEX: 21.54 KG/M2

## 2024-05-16 DIAGNOSIS — Z34.91 INITIAL OBSTETRIC VISIT IN FIRST TRIMESTER (HHS-HCC): ICD-10-CM

## 2024-05-16 DIAGNOSIS — Z34.91 PRENATAL CARE IN FIRST TRIMESTER (HHS-HCC): Primary | ICD-10-CM

## 2024-05-16 DIAGNOSIS — Z3A.01 LESS THAN 8 WEEKS GESTATION OF PREGNANCY (HHS-HCC): ICD-10-CM

## 2024-05-16 DIAGNOSIS — Z34.81 MULTIGRAVIDA IN FIRST TRIMESTER (HHS-HCC): ICD-10-CM

## 2024-05-16 DIAGNOSIS — O21.9 NAUSEA AND VOMITING IN PREGNANCY PRIOR TO 22 WEEKS GESTATION (HHS-HCC): ICD-10-CM

## 2024-05-16 DIAGNOSIS — Z34.91 PRENATAL CARE IN FIRST TRIMESTER (HHS-HCC): ICD-10-CM

## 2024-05-16 PROBLEM — F41.9 ANXIETY AND DEPRESSION: Status: ACTIVE | Noted: 2024-05-16

## 2024-05-16 PROBLEM — Z34.80 SUPERVISION OF OTHER NORMAL PREGNANCY, ANTEPARTUM (HHS-HCC): Status: ACTIVE | Noted: 2024-05-16

## 2024-05-16 PROBLEM — F32.A ANXIETY AND DEPRESSION: Status: ACTIVE | Noted: 2024-05-16

## 2024-05-16 PROBLEM — O21.0: Status: RESOLVED | Noted: 2023-10-31 | Resolved: 2024-05-16

## 2024-05-16 LAB
ABO GROUP (TYPE) IN BLOOD: NORMAL
ANTIBODY SCREEN: NORMAL
ERYTHROCYTE [DISTWIDTH] IN BLOOD BY AUTOMATED COUNT: 11.9 % (ref 11.5–14.5)
HBV SURFACE AG SERPL QL IA: NONREACTIVE
HCT VFR BLD AUTO: 37.3 % (ref 36–46)
HCV AB SER QL: NONREACTIVE
HGB BLD-MCNC: 12.4 G/DL (ref 12–16)
HIV 1+2 AB+HIV1 P24 AG SERPL QL IA: NONREACTIVE
MCH RBC QN AUTO: 31.2 PG (ref 26–34)
MCHC RBC AUTO-ENTMCNC: 33.2 G/DL (ref 32–36)
MCV RBC AUTO: 94 FL (ref 80–100)
NRBC BLD-RTO: 0 /100 WBCS (ref 0–0)
PLATELET # BLD AUTO: 252 X10*3/UL (ref 150–450)
RBC # BLD AUTO: 3.98 X10*6/UL (ref 4–5.2)
REFLEX ADDED, ANEMIA PANEL: NORMAL
RH FACTOR (ANTIGEN D): NORMAL
RUBV IGG SERPL IA-ACNC: 0.6 IA
RUBV IGG SERPL QL IA: NEGATIVE
TREPONEMA PALLIDUM IGG+IGM AB [PRESENCE] IN SERUM OR PLASMA BY IMMUNOASSAY: NONREACTIVE
WBC # BLD AUTO: 6.2 X10*3/UL (ref 4.4–11.3)

## 2024-05-16 PROCEDURE — 86803 HEPATITIS C AB TEST: CPT

## 2024-05-16 PROCEDURE — 87591 N.GONORRHOEAE DNA AMP PROB: CPT

## 2024-05-16 PROCEDURE — 86900 BLOOD TYPING SEROLOGIC ABO: CPT

## 2024-05-16 PROCEDURE — 36415 COLL VENOUS BLD VENIPUNCTURE: CPT

## 2024-05-16 PROCEDURE — H1000 PRENATAL CARE ATRISK ASSESSM: HCPCS | Performed by: MIDWIFE

## 2024-05-16 PROCEDURE — 86780 TREPONEMA PALLIDUM: CPT

## 2024-05-16 PROCEDURE — 87340 HEPATITIS B SURFACE AG IA: CPT

## 2024-05-16 PROCEDURE — 87389 HIV-1 AG W/HIV-1&-2 AB AG IA: CPT

## 2024-05-16 PROCEDURE — 86317 IMMUNOASSAY INFECTIOUS AGENT: CPT

## 2024-05-16 PROCEDURE — 86850 RBC ANTIBODY SCREEN: CPT

## 2024-05-16 PROCEDURE — 85027 COMPLETE CBC AUTOMATED: CPT

## 2024-05-16 PROCEDURE — 83020 HEMOGLOBIN ELECTROPHORESIS: CPT | Performed by: MIDWIFE

## 2024-05-16 PROCEDURE — 83021 HEMOGLOBIN CHROMOTOGRAPHY: CPT

## 2024-05-16 PROCEDURE — 87491 CHLMYD TRACH DNA AMP PROBE: CPT

## 2024-05-16 PROCEDURE — 86901 BLOOD TYPING SEROLOGIC RH(D): CPT

## 2024-05-16 PROCEDURE — 87086 URINE CULTURE/COLONY COUNT: CPT

## 2024-05-16 PROCEDURE — 99214 OFFICE O/P EST MOD 30 MIN: CPT | Performed by: MIDWIFE

## 2024-05-16 RX ORDER — MIRTAZAPINE 15 MG/1
15 TABLET, FILM COATED ORAL NIGHTLY
COMMUNITY
Start: 2024-05-05

## 2024-05-16 RX ORDER — METOCLOPRAMIDE 10 MG/1
10 TABLET ORAL 4 TIMES DAILY
Qty: 40 TABLET | Refills: 2 | Status: SHIPPED | OUTPATIENT
Start: 2024-05-16 | End: 2024-05-26

## 2024-05-16 ASSESSMENT — EDINBURGH POSTNATAL DEPRESSION SCALE (EPDS)
I HAVE BLAMED MYSELF UNNECESSARILY WHEN THINGS WENT WRONG: YES, SOME OF THE TIME
THE THOUGHT OF HARMING MYSELF HAS OCCURRED TO ME: NEVER
I HAVE FELT SCARED OR PANICKY FOR NO GOOD REASON: YES, SOMETIMES
I HAVE LOOKED FORWARD WITH ENJOYMENT TO THINGS: RATHER LESS THAN I USED TO
I HAVE BEEN SO UNHAPPY THAT I HAVE HAD DIFFICULTY SLEEPING: YES, SOMETIMES
I HAVE BEEN ABLE TO LAUGH AND SEE THE FUNNY SIDE OF THINGS: AS MUCH AS I ALWAYS COULD
I HAVE FELT SAD OR MISERABLE: NOT VERY OFTEN
I HAVE BEEN ANXIOUS OR WORRIED FOR NO GOOD REASON: YES, SOMETIMES
THINGS HAVE BEEN GETTING ON TOP OF ME: YES, SOMETIMES I HAVEN'T BEEN COPING AS WELL AS USUAL
I HAVE BEEN SO UNHAPPY THAT I HAVE BEEN CRYING: ONLY OCCASIONALLY
TOTAL SCORE: 13

## 2024-05-16 NOTE — PROGRESS NOTES
S: Presents to office to establish prenatal care. Excited and nervous. H/O regular, monthly cycles but states LMP was abnormally heavy. Overwhelmed due to having a 1 year old and taking care of 3 teen boys with her partner. Has had antidepressants adjusted by PCP and states she feels good where she is at right now. Taking PNVs.    Symptoms: Nausea/vomiting, fatigue. Needed a zofran pump last pregnancy. Stated reglan worked better, though.    Pregnancy History:  x2. Denies h/o HDP, Gdm, PPH, blood transfusion. +PPD/PPA    O: See flow sheets    A: 27yo  at 4.4 per LMP      Anxiety/Depression    P: Reviewed BP, weight      Oriented to collaborative practice, prenatal visit schedule, [facility] delivery      New OB labs      Dating US      Pap due 10/25/2025      New OB folder provided      Proof of pregnancy/Dental letters      [facility] info/applications      Reglan ordered      Discussed comfort measures for n/v in pregnancy      Discussed mental health. Support/reassurance provided      Discussed genetics --> Is interested      RTO in 4 weeks and sooner PRN      Next visit: Review labs, ultrasound, NIPS?

## 2024-05-17 ENCOUNTER — TELEPHONE (OUTPATIENT)
Dept: OBSTETRICS AND GYNECOLOGY | Facility: CLINIC | Age: 29
End: 2024-05-17

## 2024-05-17 DIAGNOSIS — A74.9 CHLAMYDIA INFECTION: Primary | ICD-10-CM

## 2024-05-17 DIAGNOSIS — N92.6 MISSED MENSES: ICD-10-CM

## 2024-05-17 PROBLEM — O98.811 CHLAMYDIA INFECTION AFFECTING PREGNANCY IN FIRST TRIMESTER (HHS-HCC): Status: ACTIVE | Noted: 2024-05-17

## 2024-05-17 LAB
C TRACH RRNA SPEC QL NAA+PROBE: POSITIVE
HEMOGLOBIN A2: 2.9 % (ref 2–3.5)
HEMOGLOBIN A: 96.7 % (ref 95.8–98)
HEMOGLOBIN F: 0.4 % (ref 0–2)
HEMOGLOBIN IDENTIFICATION INTERPRETATION: NORMAL
N GONORRHOEA DNA SPEC QL PROBE+SIG AMP: NEGATIVE
PATH REVIEW-HGB IDENTIFICATION: NORMAL

## 2024-05-17 RX ORDER — AZITHROMYCIN 500 MG/1
1000 TABLET, FILM COATED ORAL ONCE
Qty: 2 TABLET | Refills: 0 | Status: SHIPPED | OUTPATIENT
Start: 2024-05-17 | End: 2024-05-17

## 2024-05-17 NOTE — PROGRESS NOTES
Patient called office today desiring to review new OB labs.  Patient positive for chlamydia.  Patient concerned about diagnosis as she and her partner are exclusive and she had normal screening last year.  Patient concerned that possibly BV turned into chlamydia.  Discussed with patient that positive chlamydia screen is only found if that organism is present.  Prescription for azithromycin sent to pharmacy.  Discussed with patient that she and partner both need to be treated for infection.  Patient instructed to abstain from intercourse for 1 week after both she and partner are fully treated.    Patient reports that during review of new OB lab work she did not notice an hCG or urine pregnancy test being run.  Patient desires pregnancy blood work.  Order for quantitative hCG placed in system for patient to have done at her convenience.

## 2024-05-17 NOTE — TELEPHONE ENCOUNTER
4.5 wk ob left message on ob line requesting a call back to discuss her results that she saw on her MyChart.    Message sent to Mi, in office CNM, since Guerda isn't in the office on Fridays.    Please advise

## 2024-05-18 ENCOUNTER — LAB (OUTPATIENT)
Dept: LAB | Facility: LAB | Age: 29
End: 2024-05-18
Payer: COMMERCIAL

## 2024-05-18 DIAGNOSIS — N92.6 MISSED MENSES: ICD-10-CM

## 2024-05-18 LAB
B-HCG SERPL-ACNC: 1790 MIU/ML
BACTERIA UR CULT: NORMAL

## 2024-05-18 PROCEDURE — 36415 COLL VENOUS BLD VENIPUNCTURE: CPT

## 2024-05-18 PROCEDURE — 84702 CHORIONIC GONADOTROPIN TEST: CPT

## 2024-05-22 ENCOUNTER — APPOINTMENT (OUTPATIENT)
Dept: OBSTETRICS AND GYNECOLOGY | Facility: CLINIC | Age: 29
End: 2024-05-22
Payer: COMMERCIAL

## 2024-05-31 ENCOUNTER — ANCILLARY PROCEDURE (OUTPATIENT)
Dept: RADIOLOGY | Facility: CLINIC | Age: 29
End: 2024-05-31
Payer: COMMERCIAL

## 2024-05-31 DIAGNOSIS — Z34.91 INITIAL OBSTETRIC VISIT IN FIRST TRIMESTER (HHS-HCC): ICD-10-CM

## 2024-05-31 DIAGNOSIS — O36.80X0 PREGNANCY WITH INCONCLUSIVE FETAL VIABILITY, NOT APPLICABLE OR UNSPECIFIED (HHS-HCC): ICD-10-CM

## 2024-05-31 DIAGNOSIS — Z34.91 PRENATAL CARE IN FIRST TRIMESTER (HHS-HCC): ICD-10-CM

## 2024-05-31 PROCEDURE — 76817 TRANSVAGINAL US OBSTETRIC: CPT | Performed by: MEDICAL GENETICS

## 2024-05-31 PROCEDURE — 76801 OB US < 14 WKS SINGLE FETUS: CPT | Performed by: MEDICAL GENETICS

## 2024-05-31 PROCEDURE — 76817 TRANSVAGINAL US OBSTETRIC: CPT

## 2024-05-31 PROCEDURE — 76801 OB US < 14 WKS SINGLE FETUS: CPT

## 2024-06-04 ENCOUNTER — APPOINTMENT (OUTPATIENT)
Dept: RADIOLOGY | Facility: CLINIC | Age: 29
End: 2024-06-04
Payer: COMMERCIAL

## 2024-06-07 DIAGNOSIS — J45.909 ASTHMA AFFECTING PREGNANCY IN FIRST TRIMESTER (HHS-HCC): Primary | ICD-10-CM

## 2024-06-07 DIAGNOSIS — O99.511 ASTHMA AFFECTING PREGNANCY IN FIRST TRIMESTER (HHS-HCC): Primary | ICD-10-CM

## 2024-06-07 RX ORDER — ALBUTEROL SULFATE 90 UG/1
2 AEROSOL, METERED RESPIRATORY (INHALATION) EVERY 6 HOURS PRN
Qty: 18 G | Refills: 11 | Status: SHIPPED | OUTPATIENT
Start: 2024-06-07 | End: 2025-06-07

## 2024-06-12 ENCOUNTER — APPOINTMENT (OUTPATIENT)
Dept: OBSTETRICS AND GYNECOLOGY | Facility: CLINIC | Age: 29
End: 2024-06-12
Payer: COMMERCIAL

## 2024-06-12 ENCOUNTER — LAB (OUTPATIENT)
Dept: LAB | Facility: LAB | Age: 29
End: 2024-06-12
Payer: COMMERCIAL

## 2024-06-12 VITALS — SYSTOLIC BLOOD PRESSURE: 115 MMHG | BODY MASS INDEX: 22.32 KG/M2 | DIASTOLIC BLOOD PRESSURE: 72 MMHG | WEIGHT: 126 LBS

## 2024-06-12 DIAGNOSIS — Z34.81 MULTIGRAVIDA IN FIRST TRIMESTER (HHS-HCC): ICD-10-CM

## 2024-06-12 DIAGNOSIS — Z34.80 SUPERVISION OF OTHER NORMAL PREGNANCY, ANTEPARTUM (HHS-HCC): ICD-10-CM

## 2024-06-12 DIAGNOSIS — O98.811 CHLAMYDIA INFECTION AFFECTING PREGNANCY IN FIRST TRIMESTER (HHS-HCC): ICD-10-CM

## 2024-06-12 DIAGNOSIS — Z32.01 PREGNANCY TEST POSITIVE (HHS-HCC): Primary | ICD-10-CM

## 2024-06-12 DIAGNOSIS — F33.1 MODERATE EPISODE OF RECURRENT MAJOR DEPRESSIVE DISORDER (HCC): ICD-10-CM

## 2024-06-12 DIAGNOSIS — Z3A.08 8 WEEKS GESTATION OF PREGNANCY (HHS-HCC): ICD-10-CM

## 2024-06-12 DIAGNOSIS — A74.9 CHLAMYDIA INFECTION AFFECTING PREGNANCY IN FIRST TRIMESTER (HHS-HCC): ICD-10-CM

## 2024-06-12 DIAGNOSIS — Z32.01 PREGNANCY TEST POSITIVE (HHS-HCC): ICD-10-CM

## 2024-06-12 PROCEDURE — 87491 CHLMYD TRACH DNA AMP PROBE: CPT

## 2024-06-12 PROCEDURE — 87591 N.GONORRHOEAE DNA AMP PROB: CPT

## 2024-06-12 PROCEDURE — 99214 OFFICE O/P EST MOD 30 MIN: CPT | Performed by: MIDWIFE

## 2024-06-12 RX ORDER — MIRTAZAPINE 15 MG/1
15 TABLET, FILM COATED ORAL NIGHTLY
Qty: 30 TABLET | Refills: 3 | Status: SHIPPED | OUTPATIENT
Start: 2024-06-12

## 2024-06-12 RX ORDER — AZITHROMYCIN 500 MG/1
1000 TABLET, FILM COATED ORAL ONCE
Qty: 2 TABLET | Refills: 0 | Status: SHIPPED | OUTPATIENT
Start: 2024-06-12 | End: 2024-06-12

## 2024-06-12 NOTE — TELEPHONE ENCOUNTER
Future Appointments    This patient does not currently have any appointments scheduled.  Past Visits    Date Provider Specialty Visit Type Primary Dx   03/18/2024 Chey Landaverde APRN - CNP Family Medicine Telemedicine Cystic acne vulgaris

## 2024-06-12 NOTE — PROGRESS NOTES
S: HEATH. Denies vb, lof, abdominal pain, cramping. Completed azithromycin for +chlamydia. Reports needing her inhaler on occasion.    O: See flow sheets    A: 27yo  at 8.3 weeks per LMP c/w 6 week US    P: Reviewed Bp, weight      Reviewed prenatal labs      DELORES ordered      Discussed +chlamydia, needing 36 week retesting      NT scan scheduled 7/15      RTO in 2 weeks for NIPS testing, in 4 weeks for HEATH

## 2024-06-13 LAB
C TRACH RRNA SPEC QL NAA+PROBE: NEGATIVE
N GONORRHOEA DNA SPEC QL PROBE+SIG AMP: NEGATIVE

## 2024-06-26 ENCOUNTER — APPOINTMENT (OUTPATIENT)
Dept: OBSTETRICS AND GYNECOLOGY | Facility: CLINIC | Age: 29
End: 2024-06-26
Payer: COMMERCIAL

## 2024-06-26 ENCOUNTER — APPOINTMENT (OUTPATIENT)
Dept: LAB | Facility: LAB | Age: 29
End: 2024-06-26
Payer: COMMERCIAL

## 2024-06-26 VITALS — DIASTOLIC BLOOD PRESSURE: 71 MMHG | SYSTOLIC BLOOD PRESSURE: 112 MMHG | BODY MASS INDEX: 22.85 KG/M2 | WEIGHT: 129 LBS

## 2024-06-26 DIAGNOSIS — F41.9 ANXIETY: ICD-10-CM

## 2024-06-26 DIAGNOSIS — Z3A.10 10 WEEKS GESTATION OF PREGNANCY (HHS-HCC): ICD-10-CM

## 2024-06-26 DIAGNOSIS — Z01.419 WELL WOMAN EXAM WITH ROUTINE GYNECOLOGICAL EXAM: ICD-10-CM

## 2024-06-26 DIAGNOSIS — Z34.80 SUPERVISION OF OTHER NORMAL PREGNANCY, ANTEPARTUM (HHS-HCC): Primary | ICD-10-CM

## 2024-06-26 DIAGNOSIS — A74.9 CHLAMYDIA INFECTION AFFECTING PREGNANCY IN FIRST TRIMESTER (HHS-HCC): ICD-10-CM

## 2024-06-26 DIAGNOSIS — O98.811 CHLAMYDIA INFECTION AFFECTING PREGNANCY IN FIRST TRIMESTER (HHS-HCC): ICD-10-CM

## 2024-06-26 RX ORDER — MIRTAZAPINE 15 MG/1
15 TABLET, FILM COATED ORAL NIGHTLY
Qty: 30 TABLET | Refills: 11 | Status: SHIPPED | OUTPATIENT
Start: 2024-06-26 | End: 2025-06-26

## 2024-06-26 NOTE — PROGRESS NOTES
S: HEATH. Denies vb, lof, abdominal pain, cramping, contractions.    O: See flow sheets    A: 29yo  at 10.3 per LMP c/w 6 week US    P: Reviewed bP, weight      NIPS ordered today --> Desires gender in envelope      PNVs reordered      Remeron reordered      Reviewed WNL DELORES      HEATH as scheduled

## 2024-07-05 LAB — SCAN RESULT: NORMAL

## 2024-07-10 ENCOUNTER — APPOINTMENT (OUTPATIENT)
Dept: OBSTETRICS AND GYNECOLOGY | Facility: CLINIC | Age: 29
End: 2024-07-10
Payer: COMMERCIAL

## 2024-07-10 VITALS — BODY MASS INDEX: 23.17 KG/M2 | DIASTOLIC BLOOD PRESSURE: 73 MMHG | WEIGHT: 130.8 LBS | SYSTOLIC BLOOD PRESSURE: 107 MMHG

## 2024-07-10 DIAGNOSIS — Z34.80 SUPERVISION OF OTHER NORMAL PREGNANCY, ANTEPARTUM (HHS-HCC): Primary | ICD-10-CM

## 2024-07-10 DIAGNOSIS — Z34.81 MULTIGRAVIDA IN FIRST TRIMESTER (HHS-HCC): ICD-10-CM

## 2024-07-10 DIAGNOSIS — Z3A.12 12 WEEKS GESTATION OF PREGNANCY (HHS-HCC): ICD-10-CM

## 2024-07-10 PROCEDURE — 99213 OFFICE O/P EST LOW 20 MIN: CPT | Performed by: MIDWIFE

## 2024-07-10 NOTE — PROGRESS NOTES
S: KOREY Denies vb, lof, abdominal pain, cramping. Overall fatigued.    O: See flow sheets    A: 29yo  at 12.3 per Lmp c/w 6 week US    P: Reviewed BP, weight      Discussed rrNIPS --> It's a boy!      Discussed comfort measures in pregnancy      NT scan scheduled Monday      RTO in 4 weeks and sooner PRN      Next visit: Review NT scan

## 2024-07-15 ENCOUNTER — HOSPITAL ENCOUNTER (OUTPATIENT)
Dept: RADIOLOGY | Facility: CLINIC | Age: 29
Discharge: HOME | End: 2024-07-15
Payer: COMMERCIAL

## 2024-07-15 DIAGNOSIS — Z34.91 INITIAL OBSTETRIC VISIT IN FIRST TRIMESTER (HHS-HCC): ICD-10-CM

## 2024-07-15 DIAGNOSIS — Z34.90 PREGNANCY (HHS-HCC): ICD-10-CM

## 2024-07-15 DIAGNOSIS — Z34.91 PRENATAL CARE IN FIRST TRIMESTER (HHS-HCC): ICD-10-CM

## 2024-07-15 PROCEDURE — 76816 OB US FOLLOW-UP PER FETUS: CPT

## 2024-07-15 PROCEDURE — 76816 OB US FOLLOW-UP PER FETUS: CPT | Performed by: OBSTETRICS & GYNECOLOGY
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
## 2024-08-02 ENCOUNTER — ROUTINE PRENATAL (OUTPATIENT)
Dept: OBSTETRICS AND GYNECOLOGY | Facility: CLINIC | Age: 29
End: 2024-08-02
Payer: COMMERCIAL

## 2024-08-02 VITALS — SYSTOLIC BLOOD PRESSURE: 104 MMHG | BODY MASS INDEX: 24.2 KG/M2 | DIASTOLIC BLOOD PRESSURE: 62 MMHG | WEIGHT: 136.6 LBS

## 2024-08-02 DIAGNOSIS — A74.9 CHLAMYDIA INFECTION AFFECTING PREGNANCY IN FIRST TRIMESTER (HHS-HCC): ICD-10-CM

## 2024-08-02 DIAGNOSIS — B37.31 YEAST VAGINITIS: ICD-10-CM

## 2024-08-02 DIAGNOSIS — O98.811 CHLAMYDIA INFECTION AFFECTING PREGNANCY IN FIRST TRIMESTER (HHS-HCC): ICD-10-CM

## 2024-08-02 DIAGNOSIS — N89.8 VAGINAL IRRITATION: ICD-10-CM

## 2024-08-02 DIAGNOSIS — Z87.59 HISTORY OF POSTPARTUM DEPRESSION: ICD-10-CM

## 2024-08-02 DIAGNOSIS — Z3A.15 15 WEEKS GESTATION OF PREGNANCY (HHS-HCC): Primary | ICD-10-CM

## 2024-08-02 DIAGNOSIS — Z86.59 HISTORY OF POSTPARTUM DEPRESSION: ICD-10-CM

## 2024-08-02 DIAGNOSIS — Z34.80 SUPERVISION OF OTHER NORMAL PREGNANCY, ANTEPARTUM (HHS-HCC): ICD-10-CM

## 2024-08-02 PROCEDURE — 87205 SMEAR GRAM STAIN: CPT

## 2024-08-02 RX ORDER — MICONAZOLE NITRATE 2 %
1 CREAM WITH APPLICATOR VAGINAL NIGHTLY
Qty: 35 G | Refills: 0 | Status: SHIPPED | OUTPATIENT
Start: 2024-08-02 | End: 2024-08-09

## 2024-08-02 NOTE — PROGRESS NOTES
Subjective     Kennedi Klein is a 28 y.o.  at 15w5d with a working estimated date of delivery of 2025, by Last Menstrual Period who presents as an add on visit today for concerns of possible yeast infection. She denies vaginal bleeding, leakage of fluid, or contractions. Patient reports not yet fetal movement. Patient is not taking her prenatal vitamin as she reports that it made her nauseated. Encourage dosing prior to sleep to avoid nausea and improve compliance.     Patient reports excessive drowsiness with Remeron and is requesting different antidepressant that also acts as a appetite stimulant.  Patient reports struggling with severe postpartum depression after her last pregnancy and dropping weight to 80 pounds.  Will refer to her Isha Gomez for management. Patient currently reports good appetite. 6# weight gain since last visit noted.    Patient with concerns of vaginal itching and irritation for the  last 3 to 4 days.  Patient denies any discharge, or odor.  Patient does report using MetroGel last evening with minor relief in symptoms.  Patient denies any environmental changes.    Current Outpatient Medications on File Prior to Visit   Medication Sig Dispense Refill    albuterol 90 mcg/actuation inhaler Inhale 2 puffs every 6 hours if needed for wheezing. 18 g 11    metoclopramide (Reglan) 10 mg tablet Take 1 tablet (10 mg) by mouth 4 times a day for 10 days. 40 tablet 2    mirtazapine (Remeron) 15 mg tablet Take 1 tablet (15 mg) by mouth once daily at bedtime. 30 tablet 11    prenatal vit,khanh 74-iron-folic 27 mg iron- 1 mg tablet Take 1 tablet by mouth once daily. 30 each 11     No current facility-administered medications on file prior to visit.        Her pregnancy is complicated by:  Medical Problems       Problem List       Supervision of other normal pregnancy, antepartum ([facility])    Overview Signed 7/10/2024 11:59 AM by ARVIN Dalal --> it's a boy!          Anxiety and depression    Chlamydia infection affecting pregnancy in first trimester ([facility])    Overview Signed 2024  5:40 PM by ARVIN Campo     Azithromycin Rx sent .  Collect test of cure at next appointment.  Patient for third trimester STI screening                Objective   Weight: 62 kg (136 lb 9.6 oz)  Expected Total Weight Gain: 11.5 kg (25 lb)-16 kg (35 lb)   TW.076 kg (15 lb 9.6 oz)  Pregravid BMI: 21.44      BP: 104/62  Fetal Heart Rate: 160       Physical Exam  Constitutional:       Appearance: Normal appearance.   Eyes:      Conjunctiva/sclera: Conjunctivae normal.   Pulmonary:      Effort: Pulmonary effort is normal.   Genitourinary:     Labia:         Right: No rash.         Left: No rash.       Vagina: Vaginal discharge present.      Comments: Moderate amount of thick white/yellow clumpy cottage cheeselike discharge noted in vaginal vault.  Neurological:      Mental Status: She is alert.   Psychiatric:         Mood and Affect: Mood normal.          Prenatal Labs:  Lab Results   Component Value Date    HGB 12.4 2024    HCT 37.3 2024     2024    ABO O 2024    LABRH POS 2024    NEISSGONOAMP Negative 2024    CHLAMTRACAMP Negative 2024    SYPHT Nonreactive 2024    HEPBSAG Nonreactive 2024    HIV1X2 Nonreactive 2024    URINECULTURE No significant growth 2024       Assessment/Plan   Referral to Isha Gomez.  Patient instructed on how to schedule.  Vaginitis culture obtained today-exam suggestive of yeast vaginitis.  Prescription for miconazole cream sent to pharmacy.  NT scan reviewed and although NT measurement could not be obtained did not appear thickened.  Patient has anatomy scan scheduled for .  Follow up in 4 weeks for a routine prenatal visit.    ARVIN Campo

## 2024-08-03 LAB
CLUE CELLS VAG LPF-#/AREA: NORMAL /[LPF]
NUGENT SCORE: 0
YEAST VAG WET PREP-#/AREA: NORMAL

## 2024-08-07 ENCOUNTER — APPOINTMENT (OUTPATIENT)
Dept: OBSTETRICS AND GYNECOLOGY | Facility: CLINIC | Age: 29
End: 2024-08-07
Payer: COMMERCIAL

## 2024-08-08 ENCOUNTER — APPOINTMENT (OUTPATIENT)
Dept: OBSTETRICS AND GYNECOLOGY | Facility: CLINIC | Age: 29
End: 2024-08-08
Payer: COMMERCIAL

## 2024-08-27 ENCOUNTER — HOSPITAL ENCOUNTER (OUTPATIENT)
Dept: RADIOLOGY | Facility: CLINIC | Age: 29
Discharge: HOME | End: 2024-08-27
Payer: COMMERCIAL

## 2024-08-27 DIAGNOSIS — Z34.90 ENCOUNTER FOR SUPERVISION OF NORMAL PREGNANCY, UNSPECIFIED, UNSPECIFIED TRIMESTER (HHS-HCC): ICD-10-CM

## 2024-08-27 PROCEDURE — 76805 OB US >/= 14 WKS SNGL FETUS: CPT | Performed by: MEDICAL GENETICS

## 2024-08-27 PROCEDURE — 76805 OB US >/= 14 WKS SNGL FETUS: CPT

## 2024-08-29 PROBLEM — R11.2 NAUSEA AND VOMITING: Status: ACTIVE | Noted: 2024-08-29

## 2024-08-29 PROBLEM — S06.0X9A CONCUSSION W LOSS OF CONSCIOUSNESS OF UNSP DURATION, INIT: Status: ACTIVE | Noted: 2018-03-02

## 2024-08-29 PROBLEM — R58 BLEEDING: Status: ACTIVE | Noted: 2024-08-29

## 2024-08-29 PROBLEM — N96 HISTORY OF MULTIPLE MISCARRIAGES: Status: ACTIVE | Noted: 2019-04-18

## 2024-08-29 PROBLEM — N89.8 VAGINAL DISCHARGE: Status: ACTIVE | Noted: 2024-08-29

## 2024-08-29 PROBLEM — O21.0 MILD HYPEREMESIS GRAVIDARUM (HHS-HCC): Status: ACTIVE | Noted: 2024-08-29

## 2024-08-29 PROBLEM — N76.0 ACUTE VAGINITIS: Status: ACTIVE | Noted: 2024-08-29

## 2024-08-29 PROBLEM — O36.8190 DECREASED FETAL MOVEMENT DURING PREGNANCY (HHS-HCC): Status: ACTIVE | Noted: 2024-08-29

## 2024-08-29 PROBLEM — S90.02XA CONTUSION OF LEFT ANKLE: Status: ACTIVE | Noted: 2018-03-02

## 2024-08-29 PROBLEM — J45.30 MILD PERSISTENT ASTHMA WITHOUT COMPLICATION (HHS-HCC): Status: ACTIVE | Noted: 2024-03-18

## 2024-08-29 PROBLEM — S40.012A CONTUSION OF LEFT SHOULDER: Status: ACTIVE | Noted: 2018-03-02

## 2024-08-29 PROBLEM — R09.81 CONGESTION OF PARANASAL SINUS: Status: ACTIVE | Noted: 2024-08-29

## 2024-08-29 PROBLEM — R87.610 ATYPICAL SQUAMOUS CELLS OF UNDETERMINED SIGNIFICANCE (ASCUS) ON PAPANICOLAOU SMEAR OF CERVIX: Status: ACTIVE | Noted: 2017-04-06

## 2024-08-29 PROBLEM — F41.9 ANXIETY: Status: ACTIVE | Noted: 2022-04-27

## 2024-08-29 PROBLEM — O03.9 SPONTANEOUS MISCARRIAGE (HHS-HCC): Status: ACTIVE | Noted: 2019-04-18

## 2024-08-29 PROBLEM — Z72.0 TOBACCO USE: Status: ACTIVE | Noted: 2018-03-02

## 2024-08-29 PROBLEM — F31.9 BIPOLAR DISORDER (MULTI): Status: ACTIVE | Noted: 2022-04-27

## 2024-08-29 PROBLEM — A49.9 BACTERIAL INFECTION: Status: ACTIVE | Noted: 2024-08-29

## 2024-08-29 PROBLEM — F17.200 SMOKER: Status: ACTIVE | Noted: 2019-03-15

## 2024-08-29 PROBLEM — M54.89 OTHER DORSALGIA: Status: ACTIVE | Noted: 2018-03-02

## 2024-08-29 PROBLEM — O46.90 VAGINAL BLEEDING DURING PREGNANCY (HHS-HCC): Status: ACTIVE | Noted: 2024-08-29

## 2024-08-29 PROBLEM — L03.114 LEFT ARM CELLULITIS: Status: ACTIVE | Noted: 2020-08-20

## 2024-08-29 PROBLEM — M79.605 PAIN OF LEFT LOWER EXTREMITY: Status: ACTIVE | Noted: 2018-03-02

## 2024-08-29 PROBLEM — O23.40 URINARY TRACT INFECTION IN MOTHER DURING PREGNANCY (HHS-HCC): Status: ACTIVE | Noted: 2024-08-29

## 2024-08-29 PROBLEM — O46.90 BLEEDING FROM GENITAL TRACT DURING PREGNANCY (HHS-HCC): Status: ACTIVE | Noted: 2024-08-29

## 2024-08-29 PROBLEM — M79.605 PAIN IN LEFT LEG: Status: ACTIVE | Noted: 2018-03-02

## 2024-08-29 PROBLEM — R12 HEARTBURN: Status: ACTIVE | Noted: 2024-08-29

## 2024-08-29 PROBLEM — N83.209 RUPTURED OVARIAN CYST: Status: ACTIVE | Noted: 2019-03-06

## 2024-08-29 PROBLEM — K59.00 CONSTIPATION: Status: ACTIVE | Noted: 2024-08-29

## 2024-08-29 PROBLEM — M54.2 CERVICALGIA: Status: ACTIVE | Noted: 2018-03-02

## 2024-09-04 ENCOUNTER — APPOINTMENT (OUTPATIENT)
Dept: OBSTETRICS AND GYNECOLOGY | Facility: CLINIC | Age: 29
End: 2024-09-04
Payer: COMMERCIAL

## 2024-09-04 VITALS — SYSTOLIC BLOOD PRESSURE: 101 MMHG | DIASTOLIC BLOOD PRESSURE: 69 MMHG | BODY MASS INDEX: 25.97 KG/M2 | WEIGHT: 146.6 LBS

## 2024-09-04 DIAGNOSIS — Z3A.20 20 WEEKS GESTATION OF PREGNANCY (HHS-HCC): ICD-10-CM

## 2024-09-04 DIAGNOSIS — Z34.82 MULTIGRAVIDA IN SECOND TRIMESTER (HHS-HCC): Primary | ICD-10-CM

## 2024-09-04 PROCEDURE — 99213 OFFICE O/P EST LOW 20 MIN: CPT | Performed by: MIDWIFE

## 2024-09-04 NOTE — PROGRESS NOTES
S: KOREY Denies vb, lof, abdominal pain, cramping, contractions. Reports feeling not as much fetal movement as her previous pregnancy but is able to elicit movement when she performs interventions. VVC resolved with gel.    O: See flow sheets    A: 27yo  at 20.3 per LMP c/w 6 week US    P: Reviewed BP, weight      Discussed second trimester expectations      Reviewed WNL NT scan and WNL anatomy scan      Discussed implications of anterior placenta, FKC      RTO in 4 week and sooner PRN

## 2024-09-11 ENCOUNTER — APPOINTMENT (OUTPATIENT)
Dept: BEHAVIORAL HEALTH | Facility: CLINIC | Age: 29
End: 2024-09-11
Payer: COMMERCIAL

## 2024-10-02 ENCOUNTER — APPOINTMENT (OUTPATIENT)
Dept: OBSTETRICS AND GYNECOLOGY | Facility: CLINIC | Age: 29
End: 2024-10-02
Payer: COMMERCIAL

## 2024-10-03 ENCOUNTER — APPOINTMENT (OUTPATIENT)
Dept: OBSTETRICS AND GYNECOLOGY | Facility: CLINIC | Age: 29
End: 2024-10-03
Payer: COMMERCIAL

## 2024-10-03 VITALS — WEIGHT: 157.8 LBS | DIASTOLIC BLOOD PRESSURE: 74 MMHG | SYSTOLIC BLOOD PRESSURE: 129 MMHG | BODY MASS INDEX: 27.95 KG/M2

## 2024-10-03 DIAGNOSIS — Z3A.24 24 WEEKS GESTATION OF PREGNANCY (HHS-HCC): ICD-10-CM

## 2024-10-03 DIAGNOSIS — Z34.82 MULTIGRAVIDA IN SECOND TRIMESTER (HHS-HCC): Primary | ICD-10-CM

## 2024-10-03 PROBLEM — S40.012A CONTUSION OF LEFT SHOULDER: Status: RESOLVED | Noted: 2018-03-02 | Resolved: 2024-10-03

## 2024-10-03 PROBLEM — M79.605 PAIN OF LEFT LOWER EXTREMITY: Status: RESOLVED | Noted: 2018-03-02 | Resolved: 2024-10-03

## 2024-10-03 PROBLEM — R58 BLEEDING: Status: RESOLVED | Noted: 2024-08-29 | Resolved: 2024-10-03

## 2024-10-03 PROBLEM — O23.40 URINARY TRACT INFECTION IN MOTHER DURING PREGNANCY (HHS-HCC): Status: RESOLVED | Noted: 2024-08-29 | Resolved: 2024-10-03

## 2024-10-03 PROBLEM — O36.8190 DECREASED FETAL MOVEMENT DURING PREGNANCY (HHS-HCC): Status: RESOLVED | Noted: 2024-08-29 | Resolved: 2024-10-03

## 2024-10-03 PROBLEM — N83.209 RUPTURED OVARIAN CYST: Status: RESOLVED | Noted: 2019-03-06 | Resolved: 2024-10-03

## 2024-10-03 PROBLEM — O21.0 MILD HYPEREMESIS GRAVIDARUM (HHS-HCC): Status: RESOLVED | Noted: 2024-08-29 | Resolved: 2024-10-03

## 2024-10-03 PROBLEM — R12 HEARTBURN: Status: RESOLVED | Noted: 2024-08-29 | Resolved: 2024-10-03

## 2024-10-03 PROBLEM — F41.9 ANXIETY: Status: RESOLVED | Noted: 2022-04-27 | Resolved: 2024-10-03

## 2024-10-03 PROBLEM — O46.90 BLEEDING FROM GENITAL TRACT DURING PREGNANCY (HHS-HCC): Status: RESOLVED | Noted: 2024-08-29 | Resolved: 2024-10-03

## 2024-10-03 PROBLEM — K59.00 CONSTIPATION: Status: RESOLVED | Noted: 2024-08-29 | Resolved: 2024-10-03

## 2024-10-03 PROBLEM — L03.114 LEFT ARM CELLULITIS: Status: RESOLVED | Noted: 2020-08-20 | Resolved: 2024-10-03

## 2024-10-03 PROBLEM — O03.9 SPONTANEOUS MISCARRIAGE (HHS-HCC): Status: RESOLVED | Noted: 2019-04-18 | Resolved: 2024-10-03

## 2024-10-03 PROBLEM — F17.200 SMOKER: Status: RESOLVED | Noted: 2019-03-15 | Resolved: 2024-10-03

## 2024-10-03 PROBLEM — M79.605 PAIN IN LEFT LEG: Status: RESOLVED | Noted: 2018-03-02 | Resolved: 2024-10-03

## 2024-10-03 PROBLEM — R11.2 NAUSEA AND VOMITING: Status: RESOLVED | Noted: 2024-08-29 | Resolved: 2024-10-03

## 2024-10-03 PROBLEM — O46.90 VAGINAL BLEEDING DURING PREGNANCY (HHS-HCC): Status: RESOLVED | Noted: 2024-08-29 | Resolved: 2024-10-03

## 2024-10-03 PROBLEM — M54.89 OTHER DORSALGIA: Status: RESOLVED | Noted: 2018-03-02 | Resolved: 2024-10-03

## 2024-10-03 PROBLEM — N76.0 ACUTE VAGINITIS: Status: RESOLVED | Noted: 2024-08-29 | Resolved: 2024-10-03

## 2024-10-03 PROBLEM — S06.0X9A CONCUSSION W LOSS OF CONSCIOUSNESS OF UNSP DURATION, INIT: Status: RESOLVED | Noted: 2018-03-02 | Resolved: 2024-10-03

## 2024-10-03 PROBLEM — N96 HISTORY OF MULTIPLE MISCARRIAGES: Status: RESOLVED | Noted: 2019-04-18 | Resolved: 2024-10-03

## 2024-10-03 PROBLEM — S90.02XA CONTUSION OF LEFT ANKLE: Status: RESOLVED | Noted: 2018-03-02 | Resolved: 2024-10-03

## 2024-10-03 PROBLEM — A49.9 BACTERIAL INFECTION: Status: RESOLVED | Noted: 2024-08-29 | Resolved: 2024-10-03

## 2024-10-03 PROBLEM — N89.8 VAGINAL DISCHARGE: Status: RESOLVED | Noted: 2024-08-29 | Resolved: 2024-10-03

## 2024-10-03 PROCEDURE — 99213 OFFICE O/P EST LOW 20 MIN: CPT | Performed by: MIDWIFE

## 2024-10-03 NOTE — PROGRESS NOTES
S: HEATH. Denies sol/rom. Endorses good fetal movement. Reports difficulty sleeping.    O: See flow sheets    A: 27yo  at 24.4 per LMP c/w 6 week US    P: Reviewed BP, weight     28 week labs ordered with instructions. Plans to likely drink glucola prior to next visit, be seen, then get drawn after her visit     Flu Vaccine: Declined     Discussed sleep hygiene     RTO in 4 weeks and sooner PRN     Next visit: Offer tdap, glucola?

## 2024-10-30 ENCOUNTER — APPOINTMENT (OUTPATIENT)
Dept: OBSTETRICS AND GYNECOLOGY | Facility: CLINIC | Age: 29
End: 2024-10-30
Payer: COMMERCIAL

## 2024-10-30 ENCOUNTER — LAB (OUTPATIENT)
Dept: LAB | Facility: LAB | Age: 29
End: 2024-10-30
Payer: COMMERCIAL

## 2024-10-30 VITALS — BODY MASS INDEX: 28.77 KG/M2 | DIASTOLIC BLOOD PRESSURE: 65 MMHG | SYSTOLIC BLOOD PRESSURE: 102 MMHG | WEIGHT: 162.4 LBS

## 2024-10-30 DIAGNOSIS — Z34.83 MULTIGRAVIDA IN THIRD TRIMESTER (HHS-HCC): Primary | ICD-10-CM

## 2024-10-30 DIAGNOSIS — Z3A.24 24 WEEKS GESTATION OF PREGNANCY (HHS-HCC): ICD-10-CM

## 2024-10-30 DIAGNOSIS — Z23 NEED FOR PROPHYLACTIC VACCINATION WITH COMBINED DIPHTHERIA-TETANUS-PERTUSSIS (DTP) VACCINE: ICD-10-CM

## 2024-10-30 DIAGNOSIS — Z3A.28 28 WEEKS GESTATION OF PREGNANCY (HHS-HCC): ICD-10-CM

## 2024-10-30 DIAGNOSIS — Z71.85 VACCINE COUNSELING: ICD-10-CM

## 2024-10-30 LAB
ERYTHROCYTE [DISTWIDTH] IN BLOOD BY AUTOMATED COUNT: 12.8 % (ref 11.5–14.5)
GLUCOSE 1H P 50 G GLC PO SERPL-MCNC: 98 MG/DL
HCT VFR BLD AUTO: 34.1 % (ref 36–46)
HGB BLD-MCNC: 11.3 G/DL (ref 12–16)
MCH RBC QN AUTO: 31.6 PG (ref 26–34)
MCHC RBC AUTO-ENTMCNC: 33.1 G/DL (ref 32–36)
MCV RBC AUTO: 95 FL (ref 80–100)
NRBC BLD-RTO: 0 /100 WBCS (ref 0–0)
PLATELET # BLD AUTO: 187 X10*3/UL (ref 150–450)
RBC # BLD AUTO: 3.58 X10*6/UL (ref 4–5.2)
WBC # BLD AUTO: 10.3 X10*3/UL (ref 4.4–11.3)

## 2024-10-30 PROCEDURE — 90471 IMMUNIZATION ADMIN: CPT | Performed by: MIDWIFE

## 2024-10-30 PROCEDURE — 86780 TREPONEMA PALLIDUM: CPT

## 2024-10-30 PROCEDURE — 82947 ASSAY GLUCOSE BLOOD QUANT: CPT

## 2024-10-30 PROCEDURE — 90715 TDAP VACCINE 7 YRS/> IM: CPT | Performed by: MIDWIFE

## 2024-10-30 PROCEDURE — 99213 OFFICE O/P EST LOW 20 MIN: CPT | Performed by: MIDWIFE

## 2024-10-30 PROCEDURE — 36415 COLL VENOUS BLD VENIPUNCTURE: CPT

## 2024-10-30 PROCEDURE — 85027 COMPLETE CBC AUTOMATED: CPT

## 2024-10-30 PROCEDURE — 96127 BRIEF EMOTIONAL/BEHAV ASSMT: CPT | Performed by: MIDWIFE

## 2024-10-30 ASSESSMENT — EDINBURGH POSTNATAL DEPRESSION SCALE (EPDS)
I HAVE BEEN SO UNHAPPY THAT I HAVE HAD DIFFICULTY SLEEPING: NOT VERY OFTEN
THE THOUGHT OF HARMING MYSELF HAS OCCURRED TO ME: NEVER
I HAVE BEEN SO UNHAPPY THAT I HAVE BEEN CRYING: ONLY OCCASIONALLY
TOTAL SCORE: 11
I HAVE FELT SCARED OR PANICKY FOR NO GOOD REASON: YES, SOMETIMES
I HAVE FELT SAD OR MISERABLE: NOT VERY OFTEN
I HAVE BEEN ABLE TO LAUGH AND SEE THE FUNNY SIDE OF THINGS: AS MUCH AS I ALWAYS COULD
I HAVE BEEN ANXIOUS OR WORRIED FOR NO GOOD REASON: YES, SOMETIMES
THINGS HAVE BEEN GETTING ON TOP OF ME: YES, SOMETIMES I HAVEN'T BEEN COPING AS WELL AS USUAL
I HAVE BLAMED MYSELF UNNECESSARILY WHEN THINGS WENT WRONG: YES, SOME OF THE TIME
I HAVE LOOKED FORWARD WITH ENJOYMENT TO THINGS: AS MUCH AS I EVER DID

## 2024-10-31 LAB
REFLEX ADDED, ANEMIA PANEL: NORMAL
TREPONEMA PALLIDUM IGG+IGM AB [PRESENCE] IN SERUM OR PLASMA BY IMMUNOASSAY: NONREACTIVE

## 2024-11-05 PROBLEM — O98.811 CHLAMYDIA INFECTION AFFECTING PREGNANCY IN FIRST TRIMESTER (HHS-HCC): Status: RESOLVED | Noted: 2024-05-17 | Resolved: 2024-11-05

## 2024-11-05 PROBLEM — A74.9 CHLAMYDIA INFECTION AFFECTING PREGNANCY IN FIRST TRIMESTER (HHS-HCC): Status: RESOLVED | Noted: 2024-05-17 | Resolved: 2024-11-05

## 2024-11-06 ENCOUNTER — APPOINTMENT (OUTPATIENT)
Dept: BEHAVIORAL HEALTH | Facility: CLINIC | Age: 29
End: 2024-11-06
Payer: COMMERCIAL

## 2024-11-13 ENCOUNTER — APPOINTMENT (OUTPATIENT)
Dept: OBSTETRICS AND GYNECOLOGY | Facility: CLINIC | Age: 29
End: 2024-11-13
Payer: COMMERCIAL

## 2024-11-13 VITALS — WEIGHT: 165.4 LBS | DIASTOLIC BLOOD PRESSURE: 71 MMHG | SYSTOLIC BLOOD PRESSURE: 114 MMHG | BODY MASS INDEX: 29.3 KG/M2

## 2024-11-13 DIAGNOSIS — Z34.83 MULTIGRAVIDA IN THIRD TRIMESTER (HHS-HCC): Primary | ICD-10-CM

## 2024-11-13 DIAGNOSIS — Z3A.30 30 WEEKS GESTATION OF PREGNANCY (HHS-HCC): ICD-10-CM

## 2024-11-13 NOTE — PROGRESS NOTES
S: HEATH. Denies sol/rom. Endorses good fetal movement and generalized discomfort.    O: See flow sheets    A: 27yo  at 30.3 per LMP c/w 6 week US    P: Reviewed BP, weight      Reviewed/discussed WNL 28 week labs      Third trimester expectations      RTO in 2 weeks and sooner PRN

## 2024-11-26 ENCOUNTER — APPOINTMENT (OUTPATIENT)
Dept: OBSTETRICS AND GYNECOLOGY | Facility: CLINIC | Age: 29
End: 2024-11-26
Payer: COMMERCIAL

## 2024-11-27 ENCOUNTER — APPOINTMENT (OUTPATIENT)
Dept: OBSTETRICS AND GYNECOLOGY | Facility: CLINIC | Age: 29
End: 2024-11-27
Payer: COMMERCIAL

## 2024-12-03 ENCOUNTER — APPOINTMENT (OUTPATIENT)
Dept: BEHAVIORAL HEALTH | Facility: CLINIC | Age: 29
End: 2024-12-03
Payer: COMMERCIAL

## 2024-12-03 DIAGNOSIS — F41.9 ANXIETY AND DEPRESSION: ICD-10-CM

## 2024-12-03 DIAGNOSIS — F32.A ANXIETY AND DEPRESSION: ICD-10-CM

## 2024-12-03 PROCEDURE — 99204 OFFICE O/P NEW MOD 45 MIN: CPT | Performed by: PSYCHIATRY & NEUROLOGY

## 2024-12-03 NOTE — PROGRESS NOTES
"Outpatient Psychiatry    Virtual or Telephone Consent    An interactive audio and video telecommunication system which permits real time communications between the patient (at the originating site) and provider (at the distant site) was utilized to provide this telehealth service.     Verbal consent was requested and obtained from Kennedi Klein on this date, 12/03/24 for a telehealth visit.       Subjective   Kennedi Klein, a 29 y.o. female, presenting to Psychiatry for evaluation.  Patient is referred by No primary care provider on file. .         Chief Complaint: \"I was referred by my OB Gyn\"    HPI:  Patient reports that she started taking the Remeron September 2023 for post partum depression after the birth of her daughter who is now one  After her birth she lost a great deal of weight and got to 98 lbs  She was tried on a few SSRIs which did not help before starting on Remeron  Due January 19th - having a boy and may come earlier  Has been on many other medications in the past but does not remember the names except for Zoloft which she did not do well on   She was referred to this provider due to history of severe josiah (post) partum depression after the birth of her daughter  Brother who was 38 passed away November 2023 from complications of ETOH abuse   Lives with DARIAN and his three brothers ages 10, 14 and 16 as well as her 1 year old daughter  His mother passed away recently which has been stressful for the family  Gets \"overstimulated\" which \"turns into anger\" which occurs two to three times a day and she will separate herself from the family   Feels these symptoms are manageable and may be influenced by hormonal changes  Has been happening for over a year  Not speaking to her mother at this time due to her mom's mental health  Dose has been the same and she denies any side effects    Does reports anxiety but it is \"situational\"   Does not like being in crowds or taking her daughter out in public  Denies " "any depressive symptoms at this time  Sleep and appetite are fine  Patient denies SI, HI, manic or psychotic symptoms.      Psychiatric Review Of Systems:  Depressive Symptoms: negative  Manic Symptoms: negative  Anxiety Symptoms: reports \"situational\" anxiety -does not like being in crowds  OCD:  obsessions and/or compulsions denies   Panic Disorder: denies  Social Anxiety: denies  Psychotic Symptoms: negative  Other Symptoms:   Sleep: fine   Appetite: fine  SI/HI: denies    Current Medications:    Current Outpatient Medications:     albuterol 90 mcg/actuation inhaler, Inhale 2 puffs every 6 hours if needed for wheezing., Disp: 18 g, Rfl: 11    metoclopramide (Reglan) 10 mg tablet, Take 1 tablet (10 mg) by mouth 4 times a day for 10 days., Disp: 40 tablet, Rfl: 2    mirtazapine (Remeron) 15 mg tablet, Take 1 tablet (15 mg) by mouth once daily at bedtime., Disp: 30 tablet, Rfl: 11    prenatal vit,khanh 74-iron-folic 27 mg iron- 1 mg tablet, Take 1 tablet by mouth once daily., Disp: 30 each, Rfl: 11    Medical History:  Past Medical History:   Diagnosis Date    Anxiety     Chlamydia infection affecting pregnancy in first trimester ([facility]) 05/17/2024    Azithromycin Rx sent 5/17.  Collect test of cure at next appointment.  Patient for third trimester STI screening      Depression     Seasonal allergies        Head trauma  none  Seizures none   Thyroid none      Past Psychiatric History:   Diagnoses:  had \"behavioral issues\" when she was younger, diagnosed with BAD in past, PPD after birth of daughter (severe weight loss), anxiety and depression    Previous Psychiatrist:  at least ten years ago   Therapy/past treatments:  [facility]   Current psychiatric medications:   Remeron 15 mg PO at bedtime   Past psychiatric medications:  Zoloft - had SI and increased depression - within last three years; Lexapro   Hospitalizations:   none   Suicide attempts:  none   Family psychiatric history:  mom BPD, BAD, Panic Disorder " "with agorophobia; brother ETOH;      Social History:   Currently lives: with BF, his three brothers and her one year old daughter  Grew up in [place]  Moved to [place] to be near brother   Education:  HS in [place]   Work/Finances: unknown  Family of origin:  younger brother in  and lives in [place] and older brother; mom and dad live in [facility]    Marital history/children:  lives with BF and his three brothers, one year old daughter   Current stressors: busy household  Social support:  BF  Legal History:  unknown     History: none   History of violence:   none   Access to Weapons:  none   Interests:     Substance Use History:  Tobacco use: none   Use of alcohol: denied  Use of caffeine:  one mount[place] dew  Use of other substances:  none   Legal consequences of substance use: n/a   Substance use disorder treatment:  n/a    Record Review: moderate  Reviewed notes dating back to birth of her daughter in 2023 for more information about her depression and medications she has been on     Medical Review Of Systems:  Pertinent items are noted in HPI.    OARRS:  Reviewed - no history noted of any controlled substances    Objective   Mental Status Exam  Appearance: casually dressed, fair g/h  Attitude: Calm, cooperative, and engaged in conversation.  Behavior: Appropriate eye contact.   Motor Activity: No psychomotor agitation or retardation. No abnormal movements, tremors or tics. No evidence of extrapyramidal symptoms or tardive dyskinesia.  Speech: Regular rate, rhythm, volume. Spontaneous, no pressured speech.  Mood: \"not bad\"  Affect: Euthymic, full range, mood congruent.  Thought Process: Linear, logical, and goal-directed. No loose associations or gross thought disorganization.  Thought Content: Denied current suicidal ideation or thoughts of harm to self, denied homicidal ideation or thoughts of harm to others. No delusional thinking elicited. No perseverations or obsessions identified. "   Perception: Did not endorse auditory or visual hallucinations, did not appear to be responding to hallucinatory stimuli.   Cognition: Alert, oriented x3. Preserved attention span and concentration, recent and remote memory. Adequate fund of knowledge. No deficits in language.   Insight: Fair, in regards to understanding mental health condition  Judgement: Fair      Vitals:  There were no vitals filed for this visit.    11/13/2024 from OB provider    /71     Wt 75 kg (165 lb 6.4 oz)     LMP 04/14/2024 (Exact Date)     BMI 29.30 kg/m²     BSA 1.83 m²     Falls Risk: n/a       Risk Assessment:  Risk of harm to self: Low Risk -- Risk factors include: Depression Protective factors include:Denies current suicidal ideation, Denies history of suicide attempts , Future-oriented talk , Willingness to seek help and support , Skills in problem solving, conflict resolution, and nonviolent handling of disputes, Access to a variety of clinical interventions , History of adhering to treatment recommendations and/or prescribed medication regimen , Support through ongoing medical and mental healthcare relationships , Current/history of good response to treatment/meds , Interpersonal relationships and supports, e.g., family, friends, peers, community , and Restricted access to firearms or other lethal means of suicide     Risk of harm to others: Low Risk - Risk factors include: No significant risk factors identified on screening. Protective factors include: Lack of known history of harm to others , Lack of known history of violent ideation , Lack of known access to firearms , Sense of community, availability/access to resources and support , Sense of optimism, hope , Interpersonal competence , Affect regulation , Sense of self-efficacy, internal locus of control , and Positive, pro-social family/peer network       DXS:   MDD, severe, in remission   History of BAD per chart   Anxiety, unspecified  History of Anne-Marie Partum  Depression     Assessment:  Patient is a 29 year old female pregnant with her second child with a history of anxiety and severe josiah partum depression after the birth of her first child.   She initially presented with significant weight loss and was tried on a few SSRIs which did not help (Zoloft made her suicidal).  She was on Remeron by March 29/2024 (uncertain when she started) and has done well on this medication.  Anxiety is only situational at this time and she denies depressed mood. She does report periods of time when she gets overwhelmed, which turns to anger and shse has to remove herself from the situation to calm down.  This has been going on for a year. Patient was referred to this provider due to history of severe josiah partum depression in the past.  Will monitor symptoms closely, especially after birth of her child in January.     Discussed medication risks and benefits     Patient denies SI, HI, manic or psychotic symptoms.  No side effects reported.       Plan/Recommendations:  Medications: Remeron 15 mg PO qhs  Orders: none  Follow up: 6 weeks  Call  Psychiatry at [phone] with issues.  For [facility] residents, [employer] is a 24/7 hotline you can call for assistance [626.395.9014]. Please call [phone] or go to your closest Emergency Room if you feel unsafe. This includes thoughts of hurting yourself or anyone else, or having other troubles such as hearing voices, seeing visions, or having new and scary thoughts about the people around you.    Review with patient: Treatment plan reviewed with the patient.  Medication risks/benefit reviewed with the patient          Lauren Hopkins MD

## 2024-12-10 ENCOUNTER — TELEPHONE (OUTPATIENT)
Dept: OBSTETRICS AND GYNECOLOGY | Facility: CLINIC | Age: 29
End: 2024-12-10
Payer: COMMERCIAL

## 2024-12-10 NOTE — TELEPHONE ENCOUNTER
Patient called OB line and spoke with me. 34w 2d  For the past 3 or 4 days she feels SOB , chest tightening , heart racing, hot, feels like she's gonna pass out if she doesn't lay down. Consulted with Gin Walls CNM in office who advised me [place] [facility] [place]  Patient aware and I spoke with OB triage to give them a notice.

## 2024-12-11 ENCOUNTER — APPOINTMENT (OUTPATIENT)
Dept: OBSTETRICS AND GYNECOLOGY | Facility: CLINIC | Age: 29
End: 2024-12-11
Payer: COMMERCIAL

## 2024-12-11 VITALS — SYSTOLIC BLOOD PRESSURE: 116 MMHG | WEIGHT: 174.8 LBS | BODY MASS INDEX: 30.96 KG/M2 | DIASTOLIC BLOOD PRESSURE: 75 MMHG

## 2024-12-11 DIAGNOSIS — K59.00 CONSTIPATION, UNSPECIFIED CONSTIPATION TYPE: ICD-10-CM

## 2024-12-11 DIAGNOSIS — Z3A.34 34 WEEKS GESTATION OF PREGNANCY (HHS-HCC): Primary | ICD-10-CM

## 2024-12-11 DIAGNOSIS — Z34.83 MULTIGRAVIDA IN THIRD TRIMESTER (HHS-HCC): ICD-10-CM

## 2024-12-11 DIAGNOSIS — O26.843 UTERINE SIZE-DATE DISCREPANCY IN THIRD TRIMESTER (HHS-HCC): ICD-10-CM

## 2024-12-11 DIAGNOSIS — J45.909 ASTHMA AFFECTING PREGNANCY IN THIRD TRIMESTER (HHS-HCC): ICD-10-CM

## 2024-12-11 DIAGNOSIS — O99.513 ASTHMA AFFECTING PREGNANCY IN THIRD TRIMESTER (HHS-HCC): ICD-10-CM

## 2024-12-11 PROBLEM — Z34.80 SUPERVISION OF OTHER NORMAL PREGNANCY, ANTEPARTUM (HHS-HCC): Status: RESOLVED | Noted: 2024-05-16 | Resolved: 2024-12-11

## 2024-12-11 PROBLEM — Z64.1 MULTIGRAVIDA: Status: ACTIVE | Noted: 2024-12-11

## 2024-12-11 PROCEDURE — 99214 OFFICE O/P EST MOD 30 MIN: CPT | Performed by: MIDWIFE

## 2024-12-11 RX ORDER — DOCUSATE SODIUM 100 MG/1
100 CAPSULE, LIQUID FILLED ORAL 2 TIMES DAILY PRN
Qty: 60 CAPSULE | Refills: 1 | Status: SHIPPED | OUTPATIENT
Start: 2024-12-11

## 2024-12-11 NOTE — PROGRESS NOTES
S: HEATH. Denies sol/rom. Endorses good fetal movement. Reports she called the answering service yesterday for SOB. Was directed [place] [facility] [place] but she did not go, as she feels her symptoms are related to her asthma and anxiety, not emergent. States she feels her rescue inhaler is not helping. Used 4x yesterday with minimal help. Requests colace for constipation.    O: See flow sheets        A: 28yo  at 34.3 per LMP c/w 6 week US      S>D      Constipation in pregnancy    P: Reviewed BP, weight      Long acting inhaler daily inhaler ordered      Colace ordered      Growth scan ordered for size/date discrepancy. Discussed possible polyhydramnios      RTO in 2 weeks and sooner PRN      Next visit: Review growth scan, sign consent, collect GBS

## 2024-12-12 ENCOUNTER — HOSPITAL ENCOUNTER (OUTPATIENT)
Dept: RADIOLOGY | Facility: CLINIC | Age: 29
Discharge: HOME | End: 2024-12-12
Payer: COMMERCIAL

## 2024-12-12 DIAGNOSIS — O26.843 UTERINE SIZE DATE DISCREPANCY PREGNANCY, THIRD TRIMESTER (HHS-HCC): ICD-10-CM

## 2024-12-12 DIAGNOSIS — Z34.91 PRENATAL CARE IN FIRST TRIMESTER (HHS-HCC): ICD-10-CM

## 2024-12-12 DIAGNOSIS — Z34.91 INITIAL OBSTETRIC VISIT IN FIRST TRIMESTER (HHS-HCC): ICD-10-CM

## 2024-12-12 PROCEDURE — 76819 FETAL BIOPHYS PROFIL W/O NST: CPT

## 2024-12-12 PROCEDURE — 76816 OB US FOLLOW-UP PER FETUS: CPT

## 2024-12-17 ENCOUNTER — TELEPHONE (OUTPATIENT)
Dept: OBSTETRICS AND GYNECOLOGY | Facility: CLINIC | Age: 29
End: 2024-12-17
Payer: COMMERCIAL

## 2024-12-17 NOTE — TELEPHONE ENCOUNTER
35.2 wk ob left message on ob line c/o lower pelvic and hip pain for the last few hours.    Returned patients called.  + FM, denies contractions, tightening, spotting, bleeding but has noticed an increase in vaginal discharge at times seems more watery.  Normal bowel movement this morning.  Patient states she had her baby shower this weekend and is wondering if maybe she did too much walking.    Patient advised to try wearing a maternity or pregnancy belt to help with the heaviness of her growing uterus.  She can try soaking in a warm bath or even applying a warm heating pad to the area.  She can take Tylenol per box instructions to help with discomfort.  Make sure she is drinking 2-3 liters of water a day to stay hydrated.  Patient encouraged to put a liner or pad on and if she notices after 2 hours that the pad/liner is completely wet, she should call back.      Patient offered to come in to see Mi this afternoon or Guerda at 7:30 am tomorrow for evaluation but she declined stating she thinks she would like to monitor for now.    Patient advised to call back with contractions that are wrapping around her belly, taking her breath away every 5 minutes, lasting for 1 minute for a full hour, bleeding like a period, gush of fluid or with any other questions or concerns.

## 2024-12-23 ENCOUNTER — DOCUMENTATION (OUTPATIENT)
Dept: OBSTETRICS AND GYNECOLOGY | Facility: HOSPITAL | Age: 29
End: 2024-12-23
Payer: COMMERCIAL

## 2024-12-24 NOTE — PROGRESS NOTES
Patient called answering service today for concerns for contractions. Patient reports that contractions started approximately 30-45 minutes ago and are occurring every 5 minutes. Patient describes contractions as tightening in there lower abdomen. Patient reports good fetal movement and denies any ROM, bleeding, urinary symptoms, or concerns for constipation. Patient reports to prior term births.     Encouraged patient to eat, drink two glasses of water, take Tylenol, soak in a warm bath, and continue to monitor. Patient to reach back out to on call CNM if contractions increase in frequency or strength or for any other concerns.     Mi Michelle, JESSIE-JOVANA

## 2024-12-26 ENCOUNTER — APPOINTMENT (OUTPATIENT)
Dept: OBSTETRICS AND GYNECOLOGY | Facility: CLINIC | Age: 29
End: 2024-12-26
Payer: COMMERCIAL

## 2024-12-26 VITALS — DIASTOLIC BLOOD PRESSURE: 75 MMHG | SYSTOLIC BLOOD PRESSURE: 109 MMHG | BODY MASS INDEX: 30.72 KG/M2 | WEIGHT: 173.4 LBS

## 2024-12-26 DIAGNOSIS — Z3A.36 36 WEEKS GESTATION OF PREGNANCY (HHS-HCC): ICD-10-CM

## 2024-12-26 DIAGNOSIS — Z34.83 MULTIGRAVIDA IN THIRD TRIMESTER (HHS-HCC): Primary | ICD-10-CM

## 2024-12-26 PROBLEM — Z3A.34 34 WEEKS GESTATION OF PREGNANCY (HHS-HCC): Status: RESOLVED | Noted: 2024-12-11 | Resolved: 2024-12-26

## 2024-12-26 PROBLEM — M54.2 CERVICALGIA: Status: RESOLVED | Noted: 2018-03-02 | Resolved: 2024-12-26

## 2024-12-26 PROCEDURE — 87081 CULTURE SCREEN ONLY: CPT

## 2024-12-26 PROCEDURE — 99213 OFFICE O/P EST LOW 20 MIN: CPT | Performed by: MIDWIFE

## 2024-12-26 NOTE — PROGRESS NOTES
S: HEATH. Denies sol/rom. Endorses good fetal movement.     O: See flow sheets      SVE: FT/50/-3, posterior    A: 28yo  at 36.4 per LMP c/w 6 week US        P: Reviewed BP, weight      Reviewed growth scan: EFW/AC both >90%. Repeat growth scan scheduled in 3 weeks     Discussed expectant management vs rrIOL. Would like to consider her options. Really hopes to go on her own before ROSANNE     E-consent signed     GBS collected     RTO in 1 week and sooner PRN     Next visit: Review GBS, recollect STI, as patient was +CT in early pregnancy, discuss labor plans, PP BC?

## 2024-12-29 LAB — GP B STREP GENITAL QL CULT: NORMAL

## 2025-01-02 ENCOUNTER — APPOINTMENT (OUTPATIENT)
Dept: OBSTETRICS AND GYNECOLOGY | Facility: CLINIC | Age: 30
End: 2025-01-02
Payer: COMMERCIAL

## 2025-01-02 ENCOUNTER — LAB (OUTPATIENT)
Dept: LAB | Facility: LAB | Age: 30
End: 2025-01-02
Payer: COMMERCIAL

## 2025-01-02 VITALS — BODY MASS INDEX: 30.79 KG/M2 | SYSTOLIC BLOOD PRESSURE: 107 MMHG | WEIGHT: 173.8 LBS | DIASTOLIC BLOOD PRESSURE: 53 MMHG

## 2025-01-02 DIAGNOSIS — Z34.83 MULTIGRAVIDA IN THIRD TRIMESTER (HHS-HCC): Primary | ICD-10-CM

## 2025-01-02 DIAGNOSIS — Z3A.37 37 WEEKS GESTATION OF PREGNANCY (HHS-HCC): ICD-10-CM

## 2025-01-02 PROBLEM — Z3A.36 36 WEEKS GESTATION OF PREGNANCY (HHS-HCC): Status: RESOLVED | Noted: 2024-12-26 | Resolved: 2025-01-02

## 2025-01-02 LAB — HIV 1+2 AB+HIV1 P24 AG SERPL QL IA: NONREACTIVE

## 2025-01-02 PROCEDURE — 99214 OFFICE O/P EST MOD 30 MIN: CPT | Performed by: MIDWIFE

## 2025-01-02 PROCEDURE — 87591 N.GONORRHOEAE DNA AMP PROB: CPT

## 2025-01-02 PROCEDURE — 87389 HIV-1 AG W/HIV-1&-2 AB AG IA: CPT

## 2025-01-02 PROCEDURE — 87491 CHLMYD TRACH DNA AMP PROBE: CPT

## 2025-01-02 PROCEDURE — 87661 TRICHOMONAS VAGINALIS AMPLIF: CPT

## 2025-01-02 NOTE — PROGRESS NOTES
S: HEATH. Denies sol/rom. Endorses good fetal movement.    O: See flow sheets    A: 30yo  at 37.4 per LMP c/w 6 week US    P: Reviewed BP, weight      Growth scan scheduled Tuesday      Urine collected for repeat STI      HIV ordered      GBS-      Postpartum Birth Control: Vasectomy      Order for IOL placed on  with myself at 0800      RTO in 1 week and sooner PRN       Next visit: Review labs

## 2025-01-03 LAB
C TRACH RRNA SPEC QL NAA+PROBE: NEGATIVE
N GONORRHOEA DNA SPEC QL PROBE+SIG AMP: NEGATIVE

## 2025-01-04 LAB — T VAGINALIS RRNA SPEC QL NAA+PROBE: NEGATIVE

## 2025-01-07 ENCOUNTER — ANCILLARY PROCEDURE (OUTPATIENT)
Dept: RADIOLOGY | Facility: CLINIC | Age: 30
End: 2025-01-07
Payer: COMMERCIAL

## 2025-01-07 DIAGNOSIS — Z34.91 PRENATAL CARE IN FIRST TRIMESTER (HHS-HCC): ICD-10-CM

## 2025-01-07 DIAGNOSIS — Z34.91 INITIAL OBSTETRIC VISIT IN FIRST TRIMESTER (HHS-HCC): ICD-10-CM

## 2025-01-07 PROCEDURE — 76816 OB US FOLLOW-UP PER FETUS: CPT

## 2025-01-07 PROCEDURE — 76819 FETAL BIOPHYS PROFIL W/O NST: CPT | Performed by: STUDENT IN AN ORGANIZED HEALTH CARE EDUCATION/TRAINING PROGRAM

## 2025-01-07 PROCEDURE — 76819 FETAL BIOPHYS PROFIL W/O NST: CPT

## 2025-01-07 PROCEDURE — 76816 OB US FOLLOW-UP PER FETUS: CPT | Performed by: STUDENT IN AN ORGANIZED HEALTH CARE EDUCATION/TRAINING PROGRAM

## 2025-01-08 DIAGNOSIS — Z3A.38 38 WEEKS GESTATION OF PREGNANCY (HHS-HCC): Primary | ICD-10-CM

## 2025-01-09 ENCOUNTER — APPOINTMENT (OUTPATIENT)
Dept: OBSTETRICS AND GYNECOLOGY | Facility: CLINIC | Age: 30
End: 2025-01-09
Payer: COMMERCIAL

## 2025-01-09 ENCOUNTER — APPOINTMENT (OUTPATIENT)
Dept: BEHAVIORAL HEALTH | Facility: CLINIC | Age: 30
End: 2025-01-09
Payer: COMMERCIAL

## 2025-01-09 VITALS — BODY MASS INDEX: 31.04 KG/M2 | WEIGHT: 175.2 LBS | DIASTOLIC BLOOD PRESSURE: 65 MMHG | SYSTOLIC BLOOD PRESSURE: 127 MMHG

## 2025-01-09 DIAGNOSIS — Z3A.38 38 WEEKS GESTATION OF PREGNANCY (HHS-HCC): ICD-10-CM

## 2025-01-09 DIAGNOSIS — Z34.83 MULTIGRAVIDA IN THIRD TRIMESTER (HHS-HCC): Primary | ICD-10-CM

## 2025-01-09 PROCEDURE — 99213 OFFICE O/P EST LOW 20 MIN: CPT | Performed by: MIDWIFE

## 2025-01-09 NOTE — PROGRESS NOTES
S: HEATH. Denies sol/rom. Endorses good fetal movement.    O: See flow sheets    A: 30yo  at 38.4 per LMP c/w 6 week US      LGA    P: Reviewed BP, weight      WNL labs from last visit      Reviewed/discussed growth scan from this past Tuesday      IOL rescheduled to this  at 8p with myself. Counseled expectation, plan for CRB with pitocin. AROM after CRB expulsion and epidural PRN      RTO for postpartum visit and sooner PRN

## 2025-01-12 ENCOUNTER — APPOINTMENT (OUTPATIENT)
Dept: OBSTETRICS AND GYNECOLOGY | Facility: HOSPITAL | Age: 30
End: 2025-01-12
Payer: COMMERCIAL

## 2025-01-12 ENCOUNTER — HOSPITAL ENCOUNTER (INPATIENT)
Facility: HOSPITAL | Age: 30
LOS: 2 days | Discharge: HOME | End: 2025-01-14
Attending: OBSTETRICS & GYNECOLOGY | Admitting: MIDWIFE
Payer: COMMERCIAL

## 2025-01-12 ENCOUNTER — ANESTHESIA (OUTPATIENT)
Dept: OBSTETRICS AND GYNECOLOGY | Facility: HOSPITAL | Age: 30
End: 2025-01-12
Payer: COMMERCIAL

## 2025-01-12 ENCOUNTER — ANESTHESIA EVENT (OUTPATIENT)
Dept: OBSTETRICS AND GYNECOLOGY | Facility: HOSPITAL | Age: 30
End: 2025-01-12
Payer: COMMERCIAL

## 2025-01-12 DIAGNOSIS — Z34.83 MULTIGRAVIDA IN THIRD TRIMESTER (HHS-HCC): ICD-10-CM

## 2025-01-12 LAB
ABO GROUP (TYPE) IN BLOOD: NORMAL
ANTIBODY SCREEN: NORMAL
ERYTHROCYTE [DISTWIDTH] IN BLOOD BY AUTOMATED COUNT: 14.6 % (ref 11.5–14.5)
HCT VFR BLD AUTO: 32.2 % (ref 36–46)
HGB BLD-MCNC: 10.7 G/DL (ref 12–16)
HOLD SPECIMEN: NORMAL
MCH RBC QN AUTO: 29.6 PG (ref 26–34)
MCHC RBC AUTO-ENTMCNC: 33.2 G/DL (ref 32–36)
MCV RBC AUTO: 89 FL (ref 80–100)
NRBC BLD-RTO: 0 /100 WBCS (ref 0–0)
PLATELET # BLD AUTO: 174 X10*3/UL (ref 150–450)
RBC # BLD AUTO: 3.62 X10*6/UL (ref 4–5.2)
RH FACTOR (ANTIGEN D): NORMAL
WBC # BLD AUTO: 9.6 X10*3/UL (ref 4.4–11.3)

## 2025-01-12 PROCEDURE — 2500000004 HC RX 250 GENERAL PHARMACY W/ HCPCS (ALT 636 FOR OP/ED): Performed by: NURSE ANESTHETIST, CERTIFIED REGISTERED

## 2025-01-12 PROCEDURE — 59050 FETAL MONITOR W/REPORT: CPT

## 2025-01-12 PROCEDURE — 86780 TREPONEMA PALLIDUM: CPT | Mod: STJLAB | Performed by: MIDWIFE

## 2025-01-12 PROCEDURE — 2500000004 HC RX 250 GENERAL PHARMACY W/ HCPCS (ALT 636 FOR OP/ED): Performed by: MIDWIFE

## 2025-01-12 PROCEDURE — 01967 NEURAXL LBR ANES VAG DLVR: CPT | Performed by: NURSE ANESTHETIST, CERTIFIED REGISTERED

## 2025-01-12 PROCEDURE — 7210000002 HC LABOR PER HOUR

## 2025-01-12 PROCEDURE — 3700000014 EPIDURAL BLOCK: Performed by: NURSE ANESTHETIST, CERTIFIED REGISTERED

## 2025-01-12 PROCEDURE — 1120000001 HC OB PRIVATE ROOM DAILY

## 2025-01-12 PROCEDURE — 85027 COMPLETE CBC AUTOMATED: CPT | Performed by: MIDWIFE

## 2025-01-12 PROCEDURE — 86850 RBC ANTIBODY SCREEN: CPT | Performed by: MIDWIFE

## 2025-01-12 PROCEDURE — 36415 COLL VENOUS BLD VENIPUNCTURE: CPT | Performed by: MIDWIFE

## 2025-01-12 RX ORDER — ONDANSETRON HYDROCHLORIDE 2 MG/ML
4 INJECTION, SOLUTION INTRAVENOUS EVERY 6 HOURS PRN
Status: DISCONTINUED | OUTPATIENT
Start: 2025-01-12 | End: 2025-01-13

## 2025-01-12 RX ORDER — LABETALOL HYDROCHLORIDE 5 MG/ML
20 INJECTION, SOLUTION INTRAVENOUS ONCE AS NEEDED
Status: DISCONTINUED | OUTPATIENT
Start: 2025-01-12 | End: 2025-01-13

## 2025-01-12 RX ORDER — OXYTOCIN/0.9 % SODIUM CHLORIDE 30/500 ML
2-30 PLASTIC BAG, INJECTION (ML) INTRAVENOUS CONTINUOUS
Status: DISCONTINUED | OUTPATIENT
Start: 2025-01-12 | End: 2025-01-14 | Stop reason: HOSPADM

## 2025-01-12 RX ORDER — OXYTOCIN 10 [USP'U]/ML
10 INJECTION, SOLUTION INTRAMUSCULAR; INTRAVENOUS ONCE AS NEEDED
Status: DISCONTINUED | OUTPATIENT
Start: 2025-01-12 | End: 2025-01-13

## 2025-01-12 RX ORDER — ALBUTEROL SULFATE 90 UG/1
2 INHALANT RESPIRATORY (INHALATION) EVERY 6 HOURS PRN
Status: DISCONTINUED | OUTPATIENT
Start: 2025-01-12 | End: 2025-01-14 | Stop reason: HOSPADM

## 2025-01-12 RX ORDER — NIFEDIPINE 10 MG/1
10 CAPSULE ORAL ONCE AS NEEDED
Status: DISCONTINUED | OUTPATIENT
Start: 2025-01-12 | End: 2025-01-13

## 2025-01-12 RX ORDER — METHYLERGONOVINE MALEATE 0.2 MG/ML
0.2 INJECTION INTRAVENOUS ONCE AS NEEDED
Status: DISCONTINUED | OUTPATIENT
Start: 2025-01-12 | End: 2025-01-13

## 2025-01-12 RX ORDER — TERBUTALINE SULFATE 1 MG/ML
0.25 INJECTION SUBCUTANEOUS ONCE AS NEEDED
Status: DISCONTINUED | OUTPATIENT
Start: 2025-01-12 | End: 2025-01-13

## 2025-01-12 RX ORDER — MIRTAZAPINE 15 MG/1
15 TABLET, FILM COATED ORAL NIGHTLY
Status: DISCONTINUED | OUTPATIENT
Start: 2025-01-12 | End: 2025-01-13

## 2025-01-12 RX ORDER — FENTANYL/ROPIVACAINE/NS/PF 2MCG/ML-.2
0-25 PLASTIC BAG, INJECTION (ML) INJECTION CONTINUOUS
Status: DISCONTINUED | OUTPATIENT
Start: 2025-01-12 | End: 2025-01-14 | Stop reason: HOSPADM

## 2025-01-12 RX ORDER — METOCLOPRAMIDE 10 MG/1
10 TABLET ORAL EVERY 6 HOURS PRN
Status: DISCONTINUED | OUTPATIENT
Start: 2025-01-12 | End: 2025-01-13

## 2025-01-12 RX ORDER — TRANEXAMIC ACID 100 MG/ML
1000 INJECTION, SOLUTION INTRAVENOUS ONCE AS NEEDED
Status: DISCONTINUED | OUTPATIENT
Start: 2025-01-12 | End: 2025-01-13

## 2025-01-12 RX ORDER — METOCLOPRAMIDE HYDROCHLORIDE 5 MG/ML
10 INJECTION INTRAMUSCULAR; INTRAVENOUS EVERY 6 HOURS PRN
Status: DISCONTINUED | OUTPATIENT
Start: 2025-01-12 | End: 2025-01-13

## 2025-01-12 RX ORDER — CARBOPROST TROMETHAMINE 250 UG/ML
250 INJECTION, SOLUTION INTRAMUSCULAR ONCE AS NEEDED
Status: DISCONTINUED | OUTPATIENT
Start: 2025-01-12 | End: 2025-01-13

## 2025-01-12 RX ORDER — LIDOCAINE HYDROCHLORIDE 10 MG/ML
30 INJECTION, SOLUTION INFILTRATION; PERINEURAL ONCE AS NEEDED
Status: DISCONTINUED | OUTPATIENT
Start: 2025-01-12 | End: 2025-01-13

## 2025-01-12 RX ORDER — HYDRALAZINE HYDROCHLORIDE 20 MG/ML
5 INJECTION INTRAMUSCULAR; INTRAVENOUS ONCE AS NEEDED
Status: DISCONTINUED | OUTPATIENT
Start: 2025-01-12 | End: 2025-01-13

## 2025-01-12 RX ORDER — OXYTOCIN/0.9 % SODIUM CHLORIDE 30/500 ML
60 PLASTIC BAG, INJECTION (ML) INTRAVENOUS
Status: DISCONTINUED | OUTPATIENT
Start: 2025-01-12 | End: 2025-01-13

## 2025-01-12 RX ORDER — LOPERAMIDE HYDROCHLORIDE 2 MG/1
4 CAPSULE ORAL EVERY 2 HOUR PRN
Status: DISCONTINUED | OUTPATIENT
Start: 2025-01-12 | End: 2025-01-13

## 2025-01-12 RX ORDER — SODIUM CHLORIDE 9 MG/ML
125 INJECTION, SOLUTION INTRAVENOUS CONTINUOUS
Status: DISCONTINUED | OUTPATIENT
Start: 2025-01-12 | End: 2025-01-13

## 2025-01-12 RX ORDER — ONDANSETRON 4 MG/1
4 TABLET, FILM COATED ORAL EVERY 6 HOURS PRN
Status: DISCONTINUED | OUTPATIENT
Start: 2025-01-12 | End: 2025-01-13

## 2025-01-12 RX ORDER — MISOPROSTOL 200 UG/1
800 TABLET ORAL ONCE AS NEEDED
Status: DISCONTINUED | OUTPATIENT
Start: 2025-01-12 | End: 2025-01-13

## 2025-01-12 RX ADMIN — SODIUM CHLORIDE 500 ML: 9 INJECTION, SOLUTION INTRAVENOUS at 23:30

## 2025-01-12 RX ADMIN — SODIUM CHLORIDE 125 ML/HR: 9 INJECTION, SOLUTION INTRAVENOUS at 21:00

## 2025-01-12 RX ADMIN — Medication 2 MILLI-UNITS/MIN: at 21:00

## 2025-01-12 RX ADMIN — Medication 8 ML/HR: at 23:52

## 2025-01-12 SDOH — ECONOMIC STABILITY: HOUSING INSECURITY: DO YOU FEEL UNSAFE GOING BACK TO THE PLACE WHERE YOU ARE LIVING?: NO

## 2025-01-12 SDOH — ECONOMIC STABILITY: FOOD INSECURITY: WITHIN THE PAST 12 MONTHS, YOU WORRIED THAT YOUR FOOD WOULD RUN OUT BEFORE YOU GOT MONEY TO BUY MORE.: NEVER TRUE

## 2025-01-12 SDOH — ECONOMIC STABILITY: TRANSPORTATION INSECURITY

## 2025-01-12 SDOH — ECONOMIC STABILITY: TRANSPORTATION INSECURITY: IN THE PAST 12 MONTHS, HAS LACK OF TRANSPORTATION KEPT YOU FROM MEDICAL APPOINTMENTS OR FROM GETTING MEDICATIONS?: NO

## 2025-01-12 SDOH — ECONOMIC STABILITY: FOOD INSECURITY: WITHIN THE PAST 12 MONTHS, YOU WORRIED THAT YOUR FOOD WOULD RUN OUT BEFORE YOU GOT THE MONEY TO BUY MORE.: NEVER TRUE

## 2025-01-12 SDOH — SOCIAL STABILITY: SOCIAL INSECURITY: ARE YOU OR HAVE YOU BEEN THREATENED OR ABUSED PHYSICALLY, EMOTIONALLY, OR SEXUALLY BY ANYONE?: NO

## 2025-01-12 SDOH — HEALTH STABILITY: MENTAL HEALTH: SUICIDAL BEHAVIOR (LIFETIME): NO

## 2025-01-12 SDOH — SOCIAL STABILITY: SOCIAL INSECURITY
WITHIN THE LAST YEAR, HAVE YOU BEEN RAPED OR FORCED TO HAVE ANY KIND OF SEXUAL ACTIVITY BY YOUR PARTNER OR EX-PARTNER?: NO

## 2025-01-12 SDOH — SOCIAL STABILITY: SOCIAL INSECURITY: HAS ANYONE EVER THREATENED TO HURT YOUR FAMILY OR YOUR PETS?: NO

## 2025-01-12 SDOH — ECONOMIC STABILITY: FOOD INSECURITY

## 2025-01-12 SDOH — ECONOMIC STABILITY: FOOD INSECURITY: WITHIN THE PAST 12 MONTHS, THE FOOD YOU BOUGHT JUST DIDN'T LAST AND YOU DIDN'T HAVE MONEY TO GET MORE.: NEVER TRUE

## 2025-01-12 SDOH — SOCIAL STABILITY: SOCIAL INSECURITY: HAVE YOU HAD THOUGHTS OF HARMING ANYONE ELSE?: NO

## 2025-01-12 SDOH — HEALTH STABILITY: MENTAL HEALTH: NON-SPECIFIC ACTIVE SUICIDAL THOUGHTS (PAST 1 MONTH): NO

## 2025-01-12 SDOH — HEALTH STABILITY: MENTAL HEALTH: CURRENT SMOKER: 0

## 2025-01-12 SDOH — SOCIAL STABILITY: SOCIAL INSECURITY: DOES ANYONE TRY TO KEEP YOU FROM HAVING/CONTACTING OTHER FRIENDS OR DOING THINGS OUTSIDE YOUR HOME?: NO

## 2025-01-12 SDOH — SOCIAL STABILITY: SOCIAL INSECURITY
WITHIN THE LAST YEAR, HAVE YOU BEEN KICKED, HIT, SLAPPED, OR OTHERWISE PHYSICALLY HURT BY YOUR PARTNER OR EX-PARTNER?: NO

## 2025-01-12 SDOH — SOCIAL STABILITY: SOCIAL INSECURITY: WITHIN THE LAST YEAR, HAVE YOU BEEN AFRAID OF YOUR PARTNER OR EX-PARTNER?: NO

## 2025-01-12 SDOH — SOCIAL STABILITY: SOCIAL INSECURITY: DO YOU FEEL ANYONE HAS EXPLOITED OR TAKEN ADVANTAGE OF YOU FINANCIALLY OR OF YOUR PERSONAL PROPERTY?: NO

## 2025-01-12 SDOH — SOCIAL STABILITY: SOCIAL INSECURITY: PHYSICAL ABUSE: DENIES

## 2025-01-12 SDOH — SOCIAL STABILITY: SOCIAL INSECURITY: VERBAL ABUSE: DENIES

## 2025-01-12 SDOH — SOCIAL STABILITY: SOCIAL INSECURITY: ARE THERE ANY APPARENT SIGNS OF INJURIES/BEHAVIORS THAT COULD BE RELATED TO ABUSE/NEGLECT?: NO

## 2025-01-12 SDOH — SOCIAL STABILITY: SOCIAL INSECURITY: WITHIN THE LAST YEAR, HAVE YOU BEEN HUMILIATED OR EMOTIONALLY ABUSED IN OTHER WAYS BY YOUR PARTNER OR EX-PARTNER?: NO

## 2025-01-12 SDOH — SOCIAL STABILITY: SOCIAL INSECURITY: ABUSE SCREEN: ADULT

## 2025-01-12 SDOH — HEALTH STABILITY: MENTAL HEALTH: WERE YOU ABLE TO COMPLETE ALL THE BEHAVIORAL HEALTH SCREENINGS?: YES

## 2025-01-12 ASSESSMENT — PAIN SCALES - GENERAL
PAINLEVEL_OUTOF10: 0 - NO PAIN
PAINLEVEL_OUTOF10: 0 - NO PAIN
PAINLEVEL_OUTOF10: 3
PAINLEVEL_OUTOF10: 8
PAINLEVEL_OUTOF10: 4
PAINLEVEL_OUTOF10: 8
PAINLEVEL_OUTOF10: 5 - MODERATE PAIN

## 2025-01-12 ASSESSMENT — LIFESTYLE VARIABLES
AUDIT-C TOTAL SCORE: 0
HOW MANY STANDARD DRINKS CONTAINING ALCOHOL DO YOU HAVE ON A TYPICAL DAY: PATIENT DOES NOT DRINK
AUDIT-C TOTAL SCORE: 0
SKIP TO QUESTIONS 9-10: 1
HOW OFTEN DO YOU HAVE A DRINK CONTAINING ALCOHOL: NEVER
HOW OFTEN DO YOU HAVE 6 OR MORE DRINKS ON ONE OCCASION: NEVER

## 2025-01-12 ASSESSMENT — ACTIVITIES OF DAILY LIVING (ADL)
LACK_OF_TRANSPORTATION: NO
LACK_OF_TRANSPORTATION: NO

## 2025-01-12 ASSESSMENT — PATIENT HEALTH QUESTIONNAIRE - PHQ9
1. LITTLE INTEREST OR PLEASURE IN DOING THINGS: NOT AT ALL
2. FEELING DOWN, DEPRESSED OR HOPELESS: NOT AT ALL
SUM OF ALL RESPONSES TO PHQ9 QUESTIONS 1 & 2: 0

## 2025-01-13 LAB — TREPONEMA PALLIDUM IGG+IGM AB [PRESENCE] IN SERUM OR PLASMA BY IMMUNOASSAY: NONREACTIVE

## 2025-01-13 PROCEDURE — 10907ZC DRAINAGE OF AMNIOTIC FLUID, THERAPEUTIC FROM PRODUCTS OF CONCEPTION, VIA NATURAL OR ARTIFICIAL OPENING: ICD-10-PCS | Performed by: MIDWIFE

## 2025-01-13 PROCEDURE — 2500000004 HC RX 250 GENERAL PHARMACY W/ HCPCS (ALT 636 FOR OP/ED): Performed by: MIDWIFE

## 2025-01-13 PROCEDURE — 2500000001 HC RX 250 WO HCPCS SELF ADMINISTERED DRUGS (ALT 637 FOR MEDICARE OP): Performed by: ADVANCED PRACTICE MIDWIFE

## 2025-01-13 PROCEDURE — 7100000016 HC LABOR RECOVERY PER HOUR

## 2025-01-13 PROCEDURE — 7210000002 HC LABOR PER HOUR

## 2025-01-13 PROCEDURE — 59409 OBSTETRICAL CARE: CPT | Performed by: MIDWIFE

## 2025-01-13 PROCEDURE — 2500000001 HC RX 250 WO HCPCS SELF ADMINISTERED DRUGS (ALT 637 FOR MEDICARE OP): Performed by: MIDWIFE

## 2025-01-13 PROCEDURE — 1220000001 HC OB SEMI-PRIVATE ROOM DAILY

## 2025-01-13 PROCEDURE — 3E033VJ INTRODUCTION OF OTHER HORMONE INTO PERIPHERAL VEIN, PERCUTANEOUS APPROACH: ICD-10-PCS | Performed by: MIDWIFE

## 2025-01-13 RX ORDER — BISACODYL 10 MG/1
10 SUPPOSITORY RECTAL DAILY PRN
Status: DISCONTINUED | OUTPATIENT
Start: 2025-01-13 | End: 2025-01-14 | Stop reason: HOSPADM

## 2025-01-13 RX ORDER — LIDOCAINE 560 MG/1
1 PATCH PERCUTANEOUS; TOPICAL; TRANSDERMAL
Status: DISCONTINUED | OUTPATIENT
Start: 2025-01-13 | End: 2025-01-14 | Stop reason: HOSPADM

## 2025-01-13 RX ORDER — ONDANSETRON HYDROCHLORIDE 2 MG/ML
4 INJECTION, SOLUTION INTRAVENOUS EVERY 6 HOURS PRN
Status: DISCONTINUED | OUTPATIENT
Start: 2025-01-13 | End: 2025-01-14 | Stop reason: HOSPADM

## 2025-01-13 RX ORDER — POLYETHYLENE GLYCOL 3350 17 G/17G
17 POWDER, FOR SOLUTION ORAL 2 TIMES DAILY PRN
Status: DISCONTINUED | OUTPATIENT
Start: 2025-01-13 | End: 2025-01-14 | Stop reason: HOSPADM

## 2025-01-13 RX ORDER — LABETALOL HYDROCHLORIDE 5 MG/ML
20 INJECTION, SOLUTION INTRAVENOUS ONCE AS NEEDED
Status: DISCONTINUED | OUTPATIENT
Start: 2025-01-13 | End: 2025-01-14 | Stop reason: HOSPADM

## 2025-01-13 RX ORDER — OXYTOCIN/0.9 % SODIUM CHLORIDE 30/500 ML
60 PLASTIC BAG, INJECTION (ML) INTRAVENOUS ONCE AS NEEDED
Status: DISCONTINUED | OUTPATIENT
Start: 2025-01-13 | End: 2025-01-14 | Stop reason: HOSPADM

## 2025-01-13 RX ORDER — OXYTOCIN 10 [USP'U]/ML
10 INJECTION, SOLUTION INTRAMUSCULAR; INTRAVENOUS ONCE AS NEEDED
Status: DISCONTINUED | OUTPATIENT
Start: 2025-01-13 | End: 2025-01-14 | Stop reason: HOSPADM

## 2025-01-13 RX ORDER — SIMETHICONE 80 MG
80 TABLET,CHEWABLE ORAL 4 TIMES DAILY PRN
Status: DISCONTINUED | OUTPATIENT
Start: 2025-01-13 | End: 2025-01-14 | Stop reason: HOSPADM

## 2025-01-13 RX ORDER — METHYLERGONOVINE MALEATE 0.2 MG/ML
0.2 INJECTION INTRAVENOUS ONCE AS NEEDED
Status: DISCONTINUED | OUTPATIENT
Start: 2025-01-13 | End: 2025-01-14 | Stop reason: HOSPADM

## 2025-01-13 RX ORDER — TRANEXAMIC ACID 100 MG/ML
1000 INJECTION, SOLUTION INTRAVENOUS ONCE AS NEEDED
Status: DISCONTINUED | OUTPATIENT
Start: 2025-01-13 | End: 2025-01-14 | Stop reason: HOSPADM

## 2025-01-13 RX ORDER — CARBOPROST TROMETHAMINE 250 UG/ML
250 INJECTION, SOLUTION INTRAMUSCULAR ONCE AS NEEDED
Status: DISCONTINUED | OUTPATIENT
Start: 2025-01-13 | End: 2025-01-14 | Stop reason: HOSPADM

## 2025-01-13 RX ORDER — LOPERAMIDE HYDROCHLORIDE 2 MG/1
4 CAPSULE ORAL EVERY 2 HOUR PRN
Status: DISCONTINUED | OUTPATIENT
Start: 2025-01-13 | End: 2025-01-14 | Stop reason: HOSPADM

## 2025-01-13 RX ORDER — IBUPROFEN 600 MG/1
600 TABLET ORAL EVERY 6 HOURS
Status: DISCONTINUED | OUTPATIENT
Start: 2025-01-13 | End: 2025-01-14 | Stop reason: HOSPADM

## 2025-01-13 RX ORDER — ONDANSETRON 4 MG/1
4 TABLET, FILM COATED ORAL EVERY 6 HOURS PRN
Status: DISCONTINUED | OUTPATIENT
Start: 2025-01-13 | End: 2025-01-14 | Stop reason: HOSPADM

## 2025-01-13 RX ORDER — MIRTAZAPINE 7.5 MG/1
7.5 TABLET, FILM COATED ORAL NIGHTLY
Status: DISCONTINUED | OUTPATIENT
Start: 2025-01-13 | End: 2025-01-14 | Stop reason: HOSPADM

## 2025-01-13 RX ORDER — NIFEDIPINE 10 MG/1
10 CAPSULE ORAL ONCE AS NEEDED
Status: DISCONTINUED | OUTPATIENT
Start: 2025-01-13 | End: 2025-01-14 | Stop reason: HOSPADM

## 2025-01-13 RX ORDER — ACETAMINOPHEN 325 MG/1
975 TABLET ORAL EVERY 6 HOURS
Status: DISCONTINUED | OUTPATIENT
Start: 2025-01-13 | End: 2025-01-14 | Stop reason: HOSPADM

## 2025-01-13 RX ORDER — HYDRALAZINE HYDROCHLORIDE 20 MG/ML
5 INJECTION INTRAMUSCULAR; INTRAVENOUS ONCE AS NEEDED
Status: DISCONTINUED | OUTPATIENT
Start: 2025-01-13 | End: 2025-01-14 | Stop reason: HOSPADM

## 2025-01-13 RX ORDER — MISOPROSTOL 200 UG/1
800 TABLET ORAL ONCE AS NEEDED
Status: DISCONTINUED | OUTPATIENT
Start: 2025-01-13 | End: 2025-01-14 | Stop reason: HOSPADM

## 2025-01-13 RX ORDER — ADHESIVE BANDAGE
10 BANDAGE TOPICAL
Status: DISCONTINUED | OUTPATIENT
Start: 2025-01-13 | End: 2025-01-14 | Stop reason: HOSPADM

## 2025-01-13 RX ADMIN — IBUPROFEN 600 MG: 600 TABLET, FILM COATED ORAL at 06:39

## 2025-01-13 RX ADMIN — IBUPROFEN 600 MG: 600 TABLET, FILM COATED ORAL at 12:21

## 2025-01-13 RX ADMIN — IBUPROFEN 600 MG: 600 TABLET, FILM COATED ORAL at 23:55

## 2025-01-13 RX ADMIN — ALBUTEROL SULFATE 2 PUFF: 108 INHALANT RESPIRATORY (INHALATION) at 07:45

## 2025-01-13 RX ADMIN — ACETAMINOPHEN 325MG 975 MG: 325 TABLET ORAL at 12:21

## 2025-01-13 RX ADMIN — MIRTAZAPINE 7.5 MG: 7.5 TABLET, FILM COATED ORAL at 22:55

## 2025-01-13 RX ADMIN — SODIUM CHLORIDE 125 ML/HR: 9 INJECTION, SOLUTION INTRAVENOUS at 00:09

## 2025-01-13 RX ADMIN — ACETAMINOPHEN 325MG 975 MG: 325 TABLET ORAL at 06:39

## 2025-01-13 RX ADMIN — METOCLOPRAMIDE 10 MG: 10 TABLET ORAL at 00:30

## 2025-01-13 RX ADMIN — ACETAMINOPHEN 325MG 975 MG: 325 TABLET ORAL at 18:14

## 2025-01-13 RX ADMIN — IBUPROFEN 600 MG: 600 TABLET, FILM COATED ORAL at 18:13

## 2025-01-13 RX ADMIN — ACETAMINOPHEN 325MG 975 MG: 325 TABLET ORAL at 23:55

## 2025-01-13 ASSESSMENT — PAIN SCALES - GENERAL
PAINLEVEL_OUTOF10: 0 - NO PAIN
PAINLEVEL_OUTOF10: 3
PAINLEVEL_OUTOF10: 0 - NO PAIN
PAINLEVEL_OUTOF10: 0 - NO PAIN
PAIN_LEVEL: 2
PAINLEVEL_OUTOF10: 0 - NO PAIN
PAINLEVEL_OUTOF10: 4
PAINLEVEL_OUTOF10: 2
PAINLEVEL_OUTOF10: 0 - NO PAIN
PAINLEVEL_OUTOF10: 0 - NO PAIN
PAINLEVEL_OUTOF10: 4
PAINLEVEL_OUTOF10: 0 - NO PAIN
PAINLEVEL_OUTOF10: 4
PAINLEVEL_OUTOF10: 0 - NO PAIN
PAINLEVEL_OUTOF10: 2
PAINLEVEL_OUTOF10: 0 - NO PAIN
PAINLEVEL_OUTOF10: 2
PAINLEVEL_OUTOF10: 0 - NO PAIN

## 2025-01-13 ASSESSMENT — PAIN DESCRIPTION - DESCRIPTORS
DESCRIPTORS: CRAMPING

## 2025-01-13 ASSESSMENT — PAIN DESCRIPTION - LOCATION: LOCATION: ABDOMEN

## 2025-01-13 NOTE — ANESTHESIA PROCEDURE NOTES
Epidural Block    Patient location during procedure: OB  Start time: 1/12/2025 11:37 PM  End time: 1/12/2025 11:52 PM  Reason for block: labor analgesia  Staffing  Performed: CRNA   Authorized by: NADEEM Negron    Performed by: NADEEM Negron    Preanesthetic Checklist  Completed: patient identified, IV checked, risks and benefits discussed, surgical consent, pre-op evaluation, timeout performed and sterile techniques followed  Block Timeout  RN/Licensed healthcare professional reads aloud to the Anesthesia provider and entire team: Patient identity, procedure with side and site, patient position, and as applicable the availability of implants/special equipment/special requirements.  Patient on coagulant treatment: no  Timeout performed at: 1/12/2025 11:37 PM  Block Placement  Patient position: sitting  Prep: ChloraPrep  Sterility prep: mask, hand, gloves, drape and cap  Sedation level: no sedation  Patient monitoring: heart rate, continuous pulse oximetry and blood pressure  Approach: midline  Local numbing: lidocaine 1% to skin and subcutaneous tissues  Vertebral space: lumbar  Lumbar location: L3-L4  Epidural  Loss of resistance technique: saline  Guidance: landmark technique        Needle  Needle type: Tuohy   Needle gauge: 17  Needle length: 8.9cm  Needle insertion depth: 7 cm  Catheter type: multi-orifice  Catheter size: 20 G  Catheter at skin depth: 12 cm  Catheter securement method: clear occlusive dressing and surgical tape    Test dose: lidocaine 1.5% with epinephrine 1-to-200,000  Test dose: lidocaine 1.5% with epinephrine 1-to-200,000  Test dose result: no positive test dose    PCEA  Medication concentration used: 0.2% Ropivacaine with 2 mcg/mL Fentanyl  Dose (mL): 5  Lockout (minutes): 20  1-Hour Limit (boluses/hr): 3  Basal Rate: 8        Assessment  Sensory level: T6 bilateral  Block outcome: pain improved  Number of attempts: 1  Events: no positive test dose  Procedure  assessment: patient tolerated procedure well with no immediate complications

## 2025-01-13 NOTE — PROGRESS NOTES
S: Patient called out feeling intense rectal pressure with contractions. Has to breathe through them.    O: SVE: Complete      Texanna: lisa q2-3      FHR: 135, moderate variability, +accelerations, no decels noted    A: 30yo  at 39.1 per LMP c/w 6 week       GBS-      Active labor      Category 1 FHR      LGA --> EFW 97%ile, AC >99%ile      Anxiety/depression      Normotensive        P: Reviewed vitals, labs      Room prepared for delivery      Encourage maternal positioning for labor/comfort      Continue pitocin titration per guidelines      Anticipate

## 2025-01-13 NOTE — H&P
OB Admission H&P    Assessment/Plan    Kennedi Klein is a 29 y.o.  at 39w0d, ROSANNE: 2025, by Last Menstrual Period, who presents for Induction of Labor.    Plan  -Admit to L&D, consented  -T&S, CBC, and Syphilis  -Epidural at patient request  -Recheck as clinically indicated by maternal or fetal status  -Plan to initiate induction with pitocin    Fetal Status  -NST reactive, reassuring   -Presentation vertex based on ultrasound and vaginal exam  -EFW 9lbs 8oz by [facility] AND ultrasound 5 days ago  -GBS negative    Postpartum  Contraception Plan:  vasectomy    Pregnancy Problems (from 05/15/24 to present)       Problem Noted Diagnosed Resolved    39 weeks gestation of pregnancy ([facility]) 2025 by ARVIN Dalal  No    Priority:  Medium       Overview Signed 2025 11:45 AM by ARVIN Dalal     Desired provider in labor: [x] CNM  [] Physician  [x] Initial BMI: 22  [x] Prenatal Labs: WNL  [x] Rh status: O+  [x] Genetic Screening: rrNIPS --> It's a boy!0  [x] NT US: WNL  [x] Baby ASA (if indicated): n/a  [x] Pregnancy dated by: LMP c/w 6 week US  [x] Anatomy US: wnl  [] Patient added to BirthTracks  [x] 1hr GCT at 24-28wks: 98  [x] 3 hr GTT (if indicated): n/a  [x] Rhogam (if indicated): n/a  [x] Fetal Surveillance (if indicated): n/a  [x] Tdap (27-36wks): given  [x] Flu Shot: declined  [] COVID vaccine:   [] Breastfeeding:  [x] Postpartum Birth control method: vasectomy  [x] GBS at 36 - 37 wks: negative  [x] 39 weeks discussion of IOL vs. Expectant management: Wants 39 week Iol scheduled  [x] Mode of delivery ( anticipated ):          Multigravida 2024 by ARVIN Dalal  No    Priority:  Medium       Uterine size-date discrepancy in third trimester ([facility]) 2024 by ARVIN Dalal  No    Priority:  Medium       Overview Addendum 2024 11:54 AM by ARVIN Dalal     EFW/AC both >90%  3 week rescan scheduled @38 weeks          Priority:  Medium       Chlamydia infection affecting pregnancy in first trimester ([facility]) 2024 by ARVIN Campo  2024 by Marlena Dutta, RN    Priority:  Medium       Overview Signed 2024  5:40 PM by ARVIN Campo     Azithromycin Rx sent .  Collect test of cure at next appointment.  Patient for third trimester STI screening         Supervision of other normal pregnancy, antepartum ([facility]) 2024 by ARVIN Dalal  2024 by ARVIN Dalal    Priority:  Medium       Overview Addendum 2024 11:36 AM by ARVIN Dalal                Subjective   Good fetal movement.  Denies vaginal bleeding., Denies contractions., Denies leaking of fluid.      Prenatal Provider Doris WHALEY    OB History    Para Term  AB Living   4 2 2 0 1 2   SAB IAB Ectopic Multiple Live Births   0 0 1 0 1      # Outcome Date GA Lbr [place]/2nd Weight Sex Type Anes PTL Lv   4 Current            3 Term 23 38w0d  3.572 kg F  EPI  MARIANN      Name: Jerica   2 Term            1 Ectopic                Past Surgical History:   Procedure Laterality Date    ECTOPIC PREGNANCY SURGERY         Social History     Tobacco Use    Smoking status: Former     Types: Cigarettes    Smokeless tobacco: Never   Substance Use Topics    Alcohol use: Never       Allergies   Allergen Reactions    Dextromethorphan Unknown    Diphenhydramine Hcl Hives    Hydrocodone-Acetaminophen Unknown       Medications Prior to Admission   Medication Sig Dispense Refill Last Dose/Taking    albuterol 90 mcg/actuation inhaler Inhale 2 puffs every 6 hours if needed for wheezing. 18 g 11     budesonide (Pulmicort) 90 mcg/actuation inhaler Inhale 1 puff 2 times a day. Rinse mouth with water after use to reduce aftertaste and incidence of candidiasis. Do not swallow. 1 each 11     docusate sodium (Colace) 100 mg capsule Take 1 capsule (100 mg) by mouth 2 times a day as needed for  constipation. 60 capsule 1     metoclopramide (Reglan) 10 mg tablet Take 1 tablet (10 mg) by mouth 4 times a day for 10 days. 40 tablet 2     mirtazapine (Remeron) 15 mg tablet Take 1 tablet (15 mg) by mouth once daily at bedtime. 30 tablet 11     prenatal vit,khanh 74-iron-folic 27 mg iron- 1 mg tablet Take 1 tablet by mouth once daily. 30 each 11      Objective     Last Vitals  Temp Pulse Resp BP MAP O2 Sat                   Blood Pressures    No data found in the last 1 encounters.          Physical Exam  General: NAD, mood appropriate  Cardiopulmonary: warm and well perfused, breathing comfortably on room air  Abdomen: Gravid, non-tender  Extremities: Symmetric  Speculum Exam:  Deferred  Cervix: 3/80/ballottable       Fetal Monitoring  Baseline: 135 bpm, Variability: moderate,  Accelerations: present and early deceleration noted x1  Uterine Activity: q3-4 minutes  Interpretation: Reactive    Bedside ultrasound: Yes    Labs in chart were reviewed.          Prenatal labs reviewed, remarkable for CT in first trimester. Negative DELORES and negative rescreen @36 weeks.

## 2025-01-13 NOTE — CARE PLAN
The patient's goals for the shift include progressive return to prepregnancy state    The clinical goals for the shift include progressive return to prepregnancy state      Problem: Safety - Adult  Goal: Free from fall injury  2025 by Nelsy Jansen RN  Outcome: Progressing  2025 1823 by Nelsy Jansen RN  Flowsheets (Taken 2025 1038 by Gin Taylor, RN)  Free from fall injury: Instruct family/caregiver on patient safety     Problem: Discharge Planning  Goal: Discharge to home or other facility with appropriate resources  2025 by Nelsy Jansen RN  Outcome: Progressing  2025 by Nelsy Jansen RN  Flowsheets (Taken 2025 1038 by Gin Taylor, RN)  Discharge to home or other facility with appropriate resources: Identify barriers to discharge with patient and caregiver     Problem: Postpartum  Goal: Experiences normal postpartum course  2025 by Nelsy Jansen RN  Outcome: Progressing  2025 by Nelsy Jansen RN  Flowsheets (Taken 2025 1038 by Gin Taylor, RN)  Experiences normal postpartum course:   Monitor maternal vital signs   Med administration/monitoring of effect   Fundal and lochia asssessments w/fundal massage, bladder emptying  Goal: Appropriate maternal -  bonding  2025 by Nelsy Jansen RN  Outcome: Progressing  2025 by Nelsy Jansen RN  Flowsheets (Taken 2025 1038 by Gin Taylor, RN)  Appropriate maternal- bonding: Identify family support

## 2025-01-13 NOTE — LACTATION NOTE
Lactation Consultant Note  Lactation Consultation  Reason for Consult: Initial assessment  Consultant Name: Nova Brewer    Maternal Information  Has mother  before?: Yes  How long did the mother previously breastfeed?: Older child 19 months,  for 12 months  Previous Maternal Breastfeeding Challenges: Difficult latch  Infant to breast within first 2 hours of birth?: Yes  Exclusive Pump and Bottle Feed: No    Maternal Assessment  Breast Assessment: Medium, Symmetrical, Soft  Nipple Assessment: Intact, Erect  Alterations in Nipple Condition: Stage I - pain or irritation with no skin break down  Areola Assessment: Normal    Infant Assessment  Infant Behavior: Awake, Rooting response, Sucking, Fussy  Infant Assessment: Tongue protrudes over alveolar ridge    Feeding Assessment  Nutrition Source: Breastmilk  Feeding Method: Nursing at the breast  Feeding Position: Cradle, Skin to skin, Breast sandwich, Mother needs assistance with latch/positioning  Suck/Feeding: Sustained, Baby led rhythmically, Audible swallowing  Latch Assessment: Minimal assistance is needed, Instructed on deep latch, Latch achieved, Wide open mouth < 160, Bursts of sucking, swallowing, and rest    LATCH TOOL  Latch: Grasps breast, tongue down, lips flanged, rhythmic sucking  Audible Swallowing: Spontaneous and intermittent (24 hours old)  Type of Nipple: Everted (After stimulation)  Comfort (Breast/Nipple): Filling, red/small blisters/bruises, mild/moderate discomfort  Hold (Positioning): Minimal assist, teach one side, mother does other, staff holds  LATCH Score: 8    Breast Pump  Pump: Hand expression  Frequency: Other (comment) (As needed for poor feeding)  Duration: Less than 15 minutes per session  Units of Volume: Drops    Other OB Lactation Tools       Patient Follow-up  Inpatient Lactation Follow-up Needed : Yes  Outpatient Lactation Follow-up: Recommended    Other OB Lactation Documentation  Maternal Risk Factors: Tobacco  "use (Hx anxiety, depression, bipolar disorder)  Infant Risk Factors: High birth weight >3600 g, Other (comment) (LGA, 4300g, BG stable)    Recommendations/Summary  , 39.1 weeks,  on @0506. Birthweight 4300g, LGA. TCB 0.7@4 hours. Experienced,  older child for 12 months. Oldest para given up for adoption, considered \"brother\", did not breastfeed.  Infant due to feed at time of consult, BG stable. Facial massage and suck training done for gentle waking, strong suck on gloved finger.  Taught ABC's of good positioning: arms around breast, infant belly to belly with parent, infant's curve of hip to parent's curve of body. Taught to have infant rest their chin on the breast below the areola. Parents instructed to wait for gape reflex elicited by chin pressure on the breast, before hugging infant close to facilitate latching. Parents demonstrate technique wwith minimal assistance from IBCLC to time latching. Infant able to latch deeply with wide gape and sustained rhythmical sucking.    Plan:  Taught ABC's of good positioning: arms around breast, infant belly to belly with parent, infant's curve of hip to parent's curve of body. Taught to have infant rest their chin on the breast below the areola. Parents instructed to wait for gape reflex elicited by chin pressure on the breast, before hugging infant close to facilitate latching. Parents demonstrate technique without assistance/with minimal assistance/ require assistance from IBCLC to time latching. Infant able to latch deeply with wide gape and sustained rhythmical sucking.    Educated parents on skin to skin, hand expression, hunger cues and feeding frequency/patterns. Discussed expected output, weight loss <10%, and normal bilirubin. Educated on AAP pacifier recommendations. Reviewed outpatient resources.      "

## 2025-01-13 NOTE — CARE PLAN
The patient's goals for the shift include Have a baby    The clinical goals for the shift include Reassuring FHR      Problem: Safety - Adult  Goal: Free from fall injury  Outcome: Progressing  Flowsheets (Taken 2025)  Free from fall injury: Instruct family/caregiver on patient safety     Problem: Discharge Planning  Goal: Discharge to home or other facility with appropriate resources  Outcome: Progressing  Flowsheets (Taken 2025)  Discharge to home or other facility with appropriate resources: Identify barriers to discharge with patient and caregiver     Problem: Postpartum  Goal: Experiences normal postpartum course  Outcome: Progressing  Flowsheets (Taken 2025)  Experiences normal postpartum course:   Assess uterine involution   Monitor maternal vital signs   Med administration/monitoring of effect   Fundal and lochia asssessments w/fundal massage, bladder emptying  Goal: Appropriate maternal -  bonding  Outcome: Progressing  Flowsheets (Taken 2025)  Appropriate maternal- bondin-hour rooming in   Identify family support     Problem: Vaginal Birth or  Section  Goal: Fetal and maternal status remain reassuring during the birth process  Outcome: Met  Flowsheets (Taken 2025)  Fetal and maternal status remain reassuring during the birth process:   Monitor vital signs   Monitor uterine activity   Monitor fetal heart rate   Monitor labor progression (Vaginal delivery)  Goal: Demonstrates labor coping techniques through delivery  Outcome: Met  Flowsheets (Taken 2025)  Demonstrates labor coping techniques through delivery:   Positioning, turning, and/or use of birthing ball assistance   Breathing/relaxation assistance

## 2025-01-13 NOTE — CARE PLAN
The patient's goals for the shift include return to pre pregnancy state    The clinical goals for the shift include returns to pre pregnancy state    Over the shift, the patient made progress toward the following goals.       Problem: Safety - Adult  Goal: Free from fall injury  2025 1038 by Gin Taylor RN  Outcome: Progressing  Flowsheets (Taken 2025 1038)  Free from fall injury: Instruct family/caregiver on patient safety  2025 1037 by Gin Taylor RN  Outcome: Progressing  2025 1036 by Gin Taylor RN  Outcome: Progressing  2025 0855 by Gin Taylor RN  Outcome: Progressing     Problem: Discharge Planning  Goal: Discharge to home or other facility with appropriate resources  2025 1038 by Gin Taylor RN  Outcome: Progressing  Flowsheets (Taken 2025 1038)  Discharge to home or other facility with appropriate resources: Identify barriers to discharge with patient and caregiver  2025 1037 by Gin Taylor RN  Outcome: Progressing  2025 1036 by Gin Taylor RN  Outcome: Progressing  2025 0855 by Gin Taylor RN  Outcome: Progressing     Problem: Postpartum  Goal: Experiences normal postpartum course  2025 1038 by Gin Taylor RN  Outcome: Progressing  Flowsheets (Taken 2025 1038)  Experiences normal postpartum course:   Monitor maternal vital signs   Med administration/monitoring of effect   Fundal and lochia asssessments w/fundal massage, bladder emptying  2025 1037 by Gin Taylor RN  Outcome: Progressing  2025 1036 by Gin Taylor RN  Outcome: Progressing  2025 0855 by Gin Taylor RN  Outcome: Progressing  Goal: Appropriate maternal -  bonding  2025 1038 by Gin Taylor RN  Outcome: Progressing  Flowsheets (Taken 2025 1038)  Appropriate maternal- bonding: Identify family support  2025 1037 by Gin Taylor RN  Outcome: Progressing  2025 1036 by Gin Taylor RN  Outcome: Progressing  2025  0855 by Gin Taylor, RN  Outcome: Progressing

## 2025-01-13 NOTE — ANESTHESIA POSTPROCEDURE EVALUATION
"Patient: Kennedi Klein    Procedure Summary       Date: 01/12/25 Room / Location:     Anesthesia Start: 2337 Anesthesia Stop: 01/13/25 0506    Procedure: Labor Analgesia Diagnosis:     Scheduled Providers:  Responsible Provider: NADEEM Negron    Anesthesia Type: epidural ASA Status: 2            Anesthesia Type: epidural    /70   Pulse 90   Temp 36.8 °C (98.3 °F) (Oral)   Resp 18   Ht 1.6 m (5' 3\")   Wt 79.2 kg (174 lb 7.9 oz)   LMP 04/14/2024 (Exact Date)   SpO2 99%   Breastfeeding Yes   BMI 30.91 kg/m²         Anesthesia Post Evaluation    Patient location during evaluation: bedside  Patient participation: complete - patient participated  Level of consciousness: awake and alert  Pain score: 2  Pain management: adequate  Airway patency: patent  Cardiovascular status: acceptable  Respiratory status: acceptable  Hydration status: acceptable  Postoperative Nausea and Vomiting: mild  Comments: Epidural catheter removed by nursing. No redness, swelling, or drainage at puncture site.    Complete resolution of numbness. Patient is able to lift legs, bend at the knees, and ambulate.    Patient denies problems with urination.    Patient denies nausea, headache or severe back pain.          No notable events documented.    "

## 2025-01-13 NOTE — PROGRESS NOTES
S: Sleeping with working epidural in place. Support at bedside.    O: SVE: 3/90/-2, head well applied and BBOW palpated      [place]: Jamie q2-3      FHR: 130, moderate variability, +accelerations, no decelerations noted    A: 28yo  at 39.1 per LMP c/w 6 week       GBS-      Latent labor      Category 1 FHR      LGA --> EFW 97%ile, AC >99%ile      Anxiety/depression      Normotensive        P: Reviewed vitals, labs      AROM'd with patient consent for clear fluid at 0319      Encourage maternal positioning for labor/comfort      Continue pitocin titration per guidelines --> currently at 20mu      Anticipate

## 2025-01-13 NOTE — PROGRESS NOTES
CNM Delivery Day check in--Patient has been up ambulating and voiding without difficulty; states she is feeling only tired and would like saline lock out so she can be more comfortable sleeping; VSS--afebrile and normotensive; moderate lochia rubra; will plan to continue routine post partum care and anticipate discharge tomorrow or Wednesday; all patient questions answered.

## 2025-01-13 NOTE — ANESTHESIA PREPROCEDURE EVALUATION
Patient: Kennedi Klein    Evaluation Method: In-person visit    Procedure Information    Date: 01/12/25  Procedure: Labor Analgesia         Relevant Problems   Pulmonary   (+) Mild persistent asthma without complication ([facility])      Neuro   (+) Anxiety and depression   (+) Bipolar disorder      HEENT   (+) Congestion of paranasal sinus      GYN   (+) 37 weeks gestation of pregnancy ([facility])   (+) Multigravida       Clinical information reviewed:   Tobacco  Allergies  Meds  Problems  Med Hx  Surg Hx   Fam Hx          NPO Detail:  No data recorded     OB/Gyn Evaluation    Present Pregnancy    Patient is pregnant now.   Obstetric History                Physical Exam    Airway  Mallampati: III  TM distance: >3 FB     Cardiovascular - normal exam     Dental    Pulmonary    Abdominal            Anesthesia Plan    History of general anesthesia?: yes  History of complications of general anesthesia?: no    ASA 2     epidural   (I informed and discussed the risks and benefits of general, spinal and epidural anesthesia with the patient.  The patient expressed her understanding and her questions were answered.  A verbal consent was given by the patient.  )  The patient is not a current smoker.    Anesthetic plan and risks discussed with patient.  Use of blood products discussed with patient who consented to blood products.

## 2025-01-13 NOTE — L&D DELIVERY NOTE
OB Delivery Note  2025  Kennedi Klein  29 y.o.   Vaginal, Spontaneous       Gestational Age: 39w1d  /Para:   Quantitative Blood Loss: Admission to Discharge: 0 mL (2025  7:49 PM - 2025  5:17 AM)    Luisito Klein [[id number]]      Labor Events    Rupture date/time: 2025 0319  Rupture type: Artificial  Fluid color: Clear  Fluid odor: None  Labor type: Induced Onset of Labor  Labor allowed to proceed with plans for an attempted vaginal birth?: Yes  Induction: Oxytocin  First cervical ripening date/time: 2025 2100  Induction indications: Risk Reducing  Complications: None       Labor Event Times    Dilation complete date/time: 2025 0452  Start pushing date/time: 2025 0501       Labor Length    2nd stage: 0h 14m  3rd stage: 0h 05m       Placenta    Placenta delivery date/time: 2025 0511  Placenta removal: Spontaneous  Placenta appearance: Intact  Placenta disposition: discarded       Cord    Vessels: 3 vessels  Complications: Nuchal  Nuchal cord description: loose nuchal cord  Number of loops: 1  Delayed cord clamping?: Yes  Cord blood disposition: Lab  Gases sent?: No  Stem cell collection (by provider): No       Lacerations    Episiotomy: None  Perineal laceration: None  Other lacerations?: No  Repair suture: None       Anesthesia    Method: Epidural       Operative Delivery    Forceps attempted?: No  Vacuum extractor attempted?: No       Shoulder Dystocia    Shoulder dystocia present?: No        Delivery    Birth date/time: 2025 05:06:00  Delivery type: Vaginal, Spontaneous  Complications: None       Resuscitation    Method: Tactile stimulation, Suctioning       Apgars    Living status: Living  Apgar Component Scores:  1 min.:  5 min.:  10 min.:  15 min.:  20 min.:    Skin color:  1  1       Heart rate:  2  2       Reflex irritability:  2  2       Muscle tone:  2  2       Respiratory effort:  2  2       Total:  9  9       Apgars assigned by: JANEY  ЕЛЕНА IRVIN       Delivery Providers    Delivering clinician: ARVIN Dalal   Provider Role    Bertha Jarquin, RN Delivery Nurse    Pinky Merritt, RN Nursery Nurse     Resident                     ARVIN Dalal

## 2025-01-13 NOTE — CARE PLAN
The patient's goals for the shift include return to pre pregnancy state    The clinical goals for the shift include returns to pre pregnancy state    Over the shift, the patient made progress toward the following goals.       Problem: Safety - Adult  Goal: Free from fall injury  2025 by Gin Taylor RN  Outcome: Progressing  2025 0855 by Gin Taylor RN  Outcome: Progressing     Problem: Discharge Planning  Goal: Discharge to home or other facility with appropriate resources  2025 by Gin Taylor RN  Outcome: Progressing  2025 0855 by Gin Taylor RN  Outcome: Progressing     Problem: Postpartum  Goal: Experiences normal postpartum course  2025 by Gin Taylor RN  Outcome: Progressing  2025 0855 by Gin Taylor RN  Outcome: Progressing  Goal: Appropriate maternal -  bonding  2025 by Gin Taylor RN  Outcome: Progressing  2025 08 by Gin Taylor RN  Outcome: Progressing

## 2025-01-14 ENCOUNTER — PHARMACY VISIT (OUTPATIENT)
Dept: PHARMACY | Facility: CLINIC | Age: 30
End: 2025-01-14
Payer: MEDICAID

## 2025-01-14 VITALS
HEIGHT: 63 IN | SYSTOLIC BLOOD PRESSURE: 112 MMHG | RESPIRATION RATE: 16 BRPM | TEMPERATURE: 98.1 F | OXYGEN SATURATION: 99 % | WEIGHT: 174.49 LBS | BODY MASS INDEX: 30.92 KG/M2 | HEART RATE: 72 BPM | DIASTOLIC BLOOD PRESSURE: 68 MMHG

## 2025-01-14 PROBLEM — Z3A.39 39 WEEKS GESTATION OF PREGNANCY (HHS-HCC): Status: ACTIVE | Noted: 2025-01-14

## 2025-01-14 PROCEDURE — 99238 HOSP IP/OBS DSCHRG MGMT 30/<: CPT | Performed by: ADVANCED PRACTICE MIDWIFE

## 2025-01-14 PROCEDURE — 2500000001 HC RX 250 WO HCPCS SELF ADMINISTERED DRUGS (ALT 637 FOR MEDICARE OP): Performed by: MIDWIFE

## 2025-01-14 PROCEDURE — RXMED WILLOW AMBULATORY MEDICATION CHARGE

## 2025-01-14 RX ORDER — ACETAMINOPHEN 325 MG/1
975 TABLET ORAL EVERY 6 HOURS
Qty: 90 TABLET | Refills: 1 | Status: SHIPPED | OUTPATIENT
Start: 2025-01-14

## 2025-01-14 RX ORDER — IBUPROFEN 600 MG/1
600 TABLET ORAL EVERY 6 HOURS
Qty: 90 TABLET | Refills: 1 | Status: SHIPPED | OUTPATIENT
Start: 2025-01-14

## 2025-01-14 RX ADMIN — ACETAMINOPHEN 325MG 975 MG: 325 TABLET ORAL at 05:40

## 2025-01-14 RX ADMIN — IBUPROFEN 600 MG: 600 TABLET, FILM COATED ORAL at 05:40

## 2025-01-14 RX ADMIN — ACETAMINOPHEN 325MG 975 MG: 325 TABLET ORAL at 12:14

## 2025-01-14 RX ADMIN — IBUPROFEN 600 MG: 600 TABLET, FILM COATED ORAL at 12:14

## 2025-01-14 ASSESSMENT — PAIN SCALES - GENERAL
PAINLEVEL_OUTOF10: 0 - NO PAIN
PAINLEVEL_OUTOF10: 2
PAINLEVEL_OUTOF10: 0 - NO PAIN
PAINLEVEL_OUTOF10: 0 - NO PAIN

## 2025-01-14 ASSESSMENT — PAIN DESCRIPTION - DESCRIPTORS: DESCRIPTORS: CRAMPING

## 2025-01-14 NOTE — CARE PLAN
The patient's goals for the shift include Bond with baby    The clinical goals for the shift include Return to prepregnancy state      Problem: Safety - Adult  Goal: Free from fall injury  Outcome: Progressing  Flowsheets (Taken 2025 1038 by Gin Taylor RN)  Free from fall injury: Instruct family/caregiver on patient safety     Problem: Discharge Planning  Goal: Discharge to home or other facility with appropriate resources  Outcome: Progressing  Flowsheets (Taken 2025 1038 by Gin Taylor, RN)  Discharge to home or other facility with appropriate resources: Identify barriers to discharge with patient and caregiver     Problem: Postpartum  Goal: Experiences normal postpartum course  Outcome: Progressing  Flowsheets (Taken 2025 1038 by Gin Taylor RN)  Experiences normal postpartum course:   Monitor maternal vital signs   Med administration/monitoring of effect   Fundal and lochia asssessments w/fundal massage, bladder emptying  Goal: Appropriate maternal -  bonding  Outcome: Progressing  Flowsheets (Taken 2025 0631)  Appropriate maternal- bondin-hour rooming in

## 2025-01-14 NOTE — DISCHARGE SUMMARY
Discharge Summary    Admission Date: 1/12/2025  Discharge Date: 1/14/2025    Discharge Diagnosis  Normal labor and delivery ([facility])  Depression    Hospital Course  Delivery Date: 1/13/2025 5:06 AM  Delivery type: Vaginal, Spontaneous   GA at delivery: 39w1d  Outcome: Living  Anesthesia during delivery: Epidural  Intrapartum complications: None  Feeding method: Breastfeeding Status: Yes     Procedures: none  Contraception at discharge: none  Undecided at this time, would like to discuss further with Guerda George at PP visit.     Pertinent Physical Exam At Time of Discharge    General: Examination reveals a well developed, well nourished, female, in no acute distress. She is alert and cooperative.  Lungs: normal respiratory effort.  Breasts: lactating, no erythema or tenderness, nipples normal.  Abdomen: soft, non-tender.  Fundus: firm, below umbilicus, and nontender.  Perineum: well healing and lochia scant.  Extremities: no redness or tenderness in the calves or thighs, no edema, intact peripheral pulses.  Neurological: alert, oriented, normal speech, no focal findings or movement disorder noted.  Psychological: awake and alert.    Last Vitals:  Temp Pulse Resp BP MAP Pulse Ox   36.7 °C (98.1 °F) 72 16 112/68 78 99 %     Discharge Meds     Your medication list        START taking these medications        Instructions Last Dose Given Next Dose Due   acetaminophen 325 mg tablet  Commonly known as: Tylenol      Take 3 tablets (975 mg) by mouth every 6 hours.       ibuprofen 600 mg tablet      Take 1 tablet (600 mg) by mouth every 6 hours.              CONTINUE taking these medications        Instructions Last Dose Given Next Dose Due   albuterol 90 mcg/actuation inhaler      Inhale 2 puffs every 6 hours if needed for wheezing.       docusate sodium 100 mg capsule  Commonly known as: Colace      Take 1 capsule (100 mg) by mouth 2 times a day as needed for constipation.       prenatal vit,khanh 74-iron-folic 27 mg iron-  1 mg tablet      Take 1 tablet by mouth once daily.              STOP taking these medications      metoclopramide 10 mg tablet  Commonly known as: Reglan               ASK your doctor about these medications        Instructions Last Dose Given Next Dose Due   budesonide 90 mcg/actuation inhaler  Commonly known as: Pulmicort      Inhale 1 puff 2 times a day. Rinse mouth with water after use to reduce aftertaste and incidence of candidiasis. Do not swallow.       mirtazapine 15 mg tablet  Commonly known as: Remeron      Take 1 tablet (15 mg) by mouth once daily at bedtime.                 Where to Get Your Medications        These medications were sent to [facility]  [id number] [address]      Hours: 8:30 AM to 5:00 PM Mon-Fri Phone: [phone]   acetaminophen 325 mg tablet  ibuprofen 600 mg tablet          Complications Requiring Follow-Up  None    Follow-Up   Depression - patient states she has provider, Lauren Hopkins. She does not have an appointment scheduled for after the birth. Encouraged to call and make follow-up appointment. Takes mirtazapine daily.    Test Results Pending At Discharge  Pending Labs       No current pending labs.            Outpatient Follow-Up  Future Appointments   Date Time Provider Department Center   2/27/2025 10:50 AM ARVIN Dalal [id number] [place]     Stable postpartum day 1 with normal involution. Plan for discharge today. Postpartum precautions reviewed including postpartum depression, postpartum hemorrhage, infection, and s/s of PEC. Patient verbalized understanding.     I spent 30 minutes in the professional and overall care of this patient.      ARVIN Owens

## 2025-01-14 NOTE — CARE PLAN
The patient's goals for the shift include bond with baby    The clinical goals for the shift include maintain vitals WNL    Over the shift, the patient did make progress toward the following goals.    Verbal and written discharge instructions reviewed with mother, including post birth warning signs, medication reconciliation, follow-up appointments, and  care. Patient verbalized understanding of instructions, all questions answered at this time.

## 2025-01-14 NOTE — LACTATION NOTE
"Lactation Consultant Note  Lactation Consultation      Recommendations/Summary  Rn in room for discharge teaching and follow up with Peds concern for Nb weight loss. Mother is somewhat resistant to seeing LC again, stating \"I dont know why I need to see you again everything was fine yesterday, I just want to go home. LC reviewed NB 7.77% weight loss with mother, Mother stating NB spitting up clear and voiding and stooling often. LC discussed spit up and when normal and when to call peds. LC discussed out pt follow up for weight loss mother still agreeable but somewhat resistant - as she has successfully  her 19month old daughter for 12months with out out supplementation and only pumping when traveling in car per mother. Mother also asking for hand pump for discharge and assistance obtaining pump as she lost pump and flanges. Provided mother with hand pump as she cannot obtain a new pump from [facility] until April 1st, as she last received one March 2023 through the same insurance. Provided mother conrado info on how to obtain pump from [facility] if needed until then, as well as process for obtaining pump in April. Mother made appt with [facility] out pt LC for Thursday - she stated she has peds appt tomorrow and does not want to over do it. Mother asking LC for pacifier recommendations and shared that even after NB latched at night he was still crying and she used pacifier for him to sleep. LC reminded mother of AAP recommendation and supply and demand and extinguishing hunger cues, as NB has lost 7.77% of weight and need to bring in full milk supply.  Discharge teaching reviewed. Taught engorgement management. Reviewed s/s of plugged ducts and mastitis and treatment. Reviewed normal feeding patterns of the “4th trimester” and outpatient support.   "

## 2025-01-15 ENCOUNTER — APPOINTMENT (OUTPATIENT)
Dept: OBSTETRICS AND GYNECOLOGY | Facility: CLINIC | Age: 30
End: 2025-01-15
Payer: COMMERCIAL

## 2025-01-22 ENCOUNTER — TELEMEDICINE (OUTPATIENT)
Dept: PRIMARY CARE | Facility: CLINIC | Age: 30
End: 2025-01-22
Payer: COMMERCIAL

## 2025-01-22 ENCOUNTER — APPOINTMENT (OUTPATIENT)
Dept: URGENT CARE | Age: 30
End: 2025-01-22

## 2025-01-22 ENCOUNTER — APPOINTMENT (OUTPATIENT)
Dept: PRIMARY CARE | Facility: CLINIC | Age: 30
End: 2025-01-22
Payer: COMMERCIAL

## 2025-01-22 ENCOUNTER — APPOINTMENT (OUTPATIENT)
Dept: OBSTETRICS AND GYNECOLOGY | Facility: CLINIC | Age: 30
End: 2025-01-22
Payer: COMMERCIAL

## 2025-01-22 DIAGNOSIS — R05.1 ACUTE COUGH: ICD-10-CM

## 2025-01-22 DIAGNOSIS — J06.9 ACUTE URI: Primary | ICD-10-CM

## 2025-01-22 PROCEDURE — 99213 OFFICE O/P EST LOW 20 MIN: CPT

## 2025-01-22 RX ORDER — AZITHROMYCIN 250 MG/1
TABLET, FILM COATED ORAL
Qty: 6 TABLET | Refills: 0 | Status: SHIPPED | OUTPATIENT
Start: 2025-01-22 | End: 2025-01-27

## 2025-01-22 ASSESSMENT — ENCOUNTER SYMPTOMS
DIARRHEA: 0
HEADACHES: 0
RHINORRHEA: 1
WHEEZING: 0
VOMITING: 0
SINUS PAIN: 1
NAUSEA: 0
COUGH: 1

## 2025-01-22 NOTE — PROGRESS NOTES
On Demand Virtual Visit Patient Consent     This visit was completed via video conference. All issues as below were discussed and addressed but no physical exam was performed. If it was felt that the patient should be evaluated in clinic than they were directed there. The patient verbally consented to the visit.    An interactive audio and video telecommunication system which permits real time communications between the patient (at the originating site) and provider (at the distant site) was utilized to provide this telehealth service.   Verbal consent was requested and obtained from Kennedi Klein (or parent if under 18) 01/22/25 for a telehealth visit.   I have verbally confirmed with Kennedi Klein (or parent if under 18) that they are physically located in the [facility] during this virtual visit.    Subjective   Patient ID: Kennedi Klein is a 29 y.o. female who presents for URI.    URI   This is a new problem. Episode onset: 8days ago. The problem has been gradually worsening. Maximum temperature: not measured has been self breaking. The fever has been present for 1 to 2 days. Associated symptoms include chest pain, congestion, coughing, rhinorrhea and sinus pain. Pertinent negatives include no diarrhea, ear pain, headaches, nausea, plugged ear sensation, vomiting or wheezing. Associated symptoms comments: Cough is productive of yellow/green mucus  . She has tried increased fluids (robitussin) for the symptoms. The treatment provided mild relief.        Review of Systems   HENT:  Positive for congestion, rhinorrhea and sinus pain. Negative for ear pain.    Respiratory:  Positive for cough. Negative for wheezing.    Cardiovascular:  Positive for chest pain.   Gastrointestinal:  Negative for diarrhea, nausea and vomiting.   Neurological:  Negative for headaches.       Objective   LMP 04/14/2024 (Exact Date)     Physical Exam  Constitutional:       General: She is not in acute distress.     Appearance: Normal  appearance. She is ill-appearing.   HENT:      Nose: Congestion and rhinorrhea present.   Pulmonary:      Effort: Pulmonary effort is normal.      Comments: Moist cough present during visit    Neurological:      Mental Status: She is alert.     Pt seen via video feed to be in no acute distress  Reviewed use of otc/supportive care and sent via pt instruct   All questions were answered and need for follow-up/in person care was reviewed.        Assessment/Plan   Kennedi was seen today for uri.  Diagnoses and all orders for this visit:  Acute URI  -     azithromycin (Zithromax) 250 mg tablet; Take 2 tablets (500 mg) by mouth once daily for 1 day, THEN 1 tablet (250 mg) once daily for 4 days. Take 2 tabs (500 mg) by mouth today, than 1 daily for 4 days..  Acute cough

## 2025-01-22 NOTE — PATIENT INSTRUCTIONS
Drinking plenty of fluids and getting lots of rest. Chicken soup and hot beverages may help.    Trial of nasal irrigation with a Nettipot or squeeze bottle with sterile salt water.    Nasal spray corticosteroids (Flonase) may help in reducing the inflammatory response in the nasal passages and airways. Please try 2 sprays each nostril daily for 2 weeks.  If you have season allergies, please take a daily antihistamine of your choice, such as Zyrtec/Claritin/Allegra at bedtime.    Take Tylenol or Motrin/Aleve for sinus or ear pain.    If you have good blood pressure you can try pseudofed (you must ask the pharmacist for it and show ID).    Please call or return to the office if you are not feeling better in the next 3-4 days after starting treatment.    Over-the-counter cold and cough medications     Take Mucinex for cough, drink plenty of fluids with this medication and will help break up congestion making it easier to cough up     For cough can use honey (children ages 1 and up) in hot tea or hot water. I recommend putting this in an insulated cup and carrying it around throughout the day to sip on.  Have it at your bedside at night in case you wake up coughing.  You can also use honey cough drops (adults and older children).     Recommend nasal saline for use in children and adults.  Neti Mosher can also be helpful.  (Never used tap water and a Neti pot.  Use distilled water.)     If you have plugged up congested ears or the feeling of fluid in your ears, you can use an over-the-counter nasal steroid spray like fluticasone (brand name Flonase) use 2 sprays into each nostril once or twice a day for 7 days.  This will help open up the eustachian tubes and allow the fluid to drain out of your ears.     Sleeping with your head/chest elevated can help with sinus drainage.     Adults only-can use nasal decongestant (like Afrin) at bedtime to open nasal passages so you can breathe through your nose while you sleep; avoid  using for longer than 3 days as this medication can become addicting.  Do not use if you have high blood pressure or high heart rate.     For severe pain or fever in adult-Tylenol (2 extra strength) or ibuprofen (3-4 tabs equals 600 to 800 mg) alternating as needed for pain.  Tylenol doses should be 6 to 8 hours apart and ibuprofen doses should be 6 to 8 hours apart.        Common cold medications for adults explained:     Mucinex-(generic name guaifenesin)-is an expectorant.  This will thin out all the thick mucus.  Must drink plenty of liquids for this medicine to work.     Dextromethorphan (brand name Delsym or DM)-this medicine is a cough suppressant     Honey in hot water or tea-this is a natural cough suppressant     Decongestant nasal spray- (eg: Afrin) use for temporary relief of nasal congestion-best when used at bedtime to open up nasal passages so that you are not forced to mouth breathe overnight.     Sudafed (generic name pseudoephedrine-this must be bought from the pharmacist) DO NOT use this medicine if you have high blood pressure as it can raise your blood pressure higher.  Do not use if you have any irregular heart rate.  This medicine can help clear congestion in your sinuses.

## 2025-01-30 PROCEDURE — RXMED WILLOW AMBULATORY MEDICATION CHARGE

## 2025-01-31 PROCEDURE — RXMED WILLOW AMBULATORY MEDICATION CHARGE

## 2025-02-03 ENCOUNTER — APPOINTMENT (OUTPATIENT)
Dept: BEHAVIORAL HEALTH | Facility: CLINIC | Age: 30
End: 2025-02-03
Payer: COMMERCIAL

## 2025-02-04 ENCOUNTER — PHARMACY VISIT (OUTPATIENT)
Dept: PHARMACY | Facility: CLINIC | Age: 30
End: 2025-02-04
Payer: MEDICAID

## 2025-02-27 ENCOUNTER — APPOINTMENT (OUTPATIENT)
Dept: OBSTETRICS AND GYNECOLOGY | Facility: CLINIC | Age: 30
End: 2025-02-27
Payer: COMMERCIAL

## 2025-02-28 ENCOUNTER — APPOINTMENT (OUTPATIENT)
Dept: OBSTETRICS AND GYNECOLOGY | Facility: CLINIC | Age: 30
End: 2025-02-28
Payer: COMMERCIAL

## 2025-03-04 ENCOUNTER — APPOINTMENT (OUTPATIENT)
Dept: OBSTETRICS AND GYNECOLOGY | Facility: CLINIC | Age: 30
End: 2025-03-04
Payer: COMMERCIAL

## 2025-03-06 ENCOUNTER — APPOINTMENT (OUTPATIENT)
Dept: BEHAVIORAL HEALTH | Facility: CLINIC | Age: 30
End: 2025-03-06
Payer: COMMERCIAL

## 2025-03-14 ENCOUNTER — APPOINTMENT (OUTPATIENT)
Dept: OBSTETRICS AND GYNECOLOGY | Facility: CLINIC | Age: 30
End: 2025-03-14
Payer: COMMERCIAL

## 2025-03-14 VITALS — WEIGHT: 147.2 LBS | SYSTOLIC BLOOD PRESSURE: 124 MMHG | BODY MASS INDEX: 26.08 KG/M2 | DIASTOLIC BLOOD PRESSURE: 88 MMHG

## 2025-03-14 DIAGNOSIS — N63.12 MASS OF UPPER INNER QUADRANT OF RIGHT BREAST: ICD-10-CM

## 2025-03-14 DIAGNOSIS — F32.A DEPRESSION, UNSPECIFIED DEPRESSION TYPE: ICD-10-CM

## 2025-03-14 ASSESSMENT — EDINBURGH POSTNATAL DEPRESSION SCALE (EPDS)
TOTAL SCORE: 17
I HAVE FELT SAD OR MISERABLE: YES, QUITE OFTEN
I HAVE BEEN ABLE TO LAUGH AND SEE THE FUNNY SIDE OF THINGS: NOT QUITE SO MUCH NOW
I HAVE BEEN SO UNHAPPY THAT I HAVE HAD DIFFICULTY SLEEPING: YES, MOST OF THE TIME
I HAVE LOOKED FORWARD WITH ENJOYMENT TO THINGS: RATHER LESS THAN I USED TO
I HAVE FELT SCARED OR PANICKY FOR NO GOOD REASON: YES, QUITE A LOT
THE THOUGHT OF HARMING MYSELF HAS OCCURRED TO ME: NEVER
I HAVE BLAMED MYSELF UNNECESSARILY WHEN THINGS WENT WRONG: YES, SOME OF THE TIME
THINGS HAVE BEEN GETTING ON TOP OF ME: YES, SOMETIMES I HAVEN'T BEEN COPING AS WELL AS USUAL
I HAVE BEEN ANXIOUS OR WORRIED FOR NO GOOD REASON: YES, SOMETIMES
I HAVE BEEN SO UNHAPPY THAT I HAVE BEEN CRYING: ONLY OCCASIONALLY

## 2025-03-14 NOTE — PROGRESS NOTES
Subjective   29 y.o.  presenting for postpartum follow-up   Delivery Date: 2025  Type of Delivery: Vaginal, Spontaneous   Intrapartum complications: none  Pregnancy Problems (from 05/15/24 to present)       Problem Noted Diagnosed Resolved    39 weeks gestation of pregnancy ([facility]) 2025 by ARVIN Owens  No    37 weeks gestation of pregnancy ([facility]) 2025 by ARVIN Dalal  No    Overview Signed 2025 11:45 AM by ARVIN Dalal     Desired provider in labor: [x] CNM  [] Physician  [x] Initial BMI: 22  [x] Prenatal Labs: WNL  [x] Rh status: O+  [x] Genetic Screening: rrNIPS --> It's a boy!0  [x] NT US: WNL  [x] Baby ASA (if indicated): n/a  [x] Pregnancy dated by: LMP c/w 6 week US  [x] Anatomy US: wnl  [] Patient added to BirthTracks  [x] 1hr GCT at 24-28wks: 98  [x] 3 hr GTT (if indicated): n/a  [x] Rhogam (if indicated): n/a  [x] Fetal Surveillance (if indicated): n/a  [x] Tdap (27-36wks): given  [x] Flu Shot: declined  [] COVID vaccine:   [] Breastfeeding:  [x] Postpartum Birth control method: vasectomy  [x] GBS at 36 - 37 wks: negative  [x] 39 weeks discussion of IOL vs. Expectant management: Wants 39 week Iol scheduled  [x] Mode of delivery ( anticipated ):          Multigravida 2024 by ARVIN Dalal  No    Uterine size-date discrepancy in third trimester ([facility]) 2024 by ARIVN Dalal  No    Overview Addendum 2024 11:54 AM by ARVIN Dalal     EFW/AC both >90%  3 week rescan scheduled @38 weeks         Chlamydia infection affecting pregnancy in first trimester ([facility]) 2024 by ARVIN Campo  2024 by Marlena Dutta RN    Priority:  Medium       Overview Signed 2024  5:40 PM by ARVIN Campo     Azithromycin Rx sent .  Collect test of cure at next appointment.  Patient for third trimester STI screening         Supervision of other normal  pregnancy, antepartum ([facility]) 2024 by ARVIN Dalal  2024 by ARVIN Dlaal    Priority:  Medium       Overview Addendum 2024 11:36 AM by ARVIN Dalal     rrNIPS --> it's a boy!  Desired provider in labor: [x] CNM  [] Physician  [x] Initial BMI: 22  [x] Prenatal Labs: WNL  [x] Rh status: O+  [x] Genetic Screening: rrNIPS --> It's a boy!0  [x] NT US: WNL  [x] Baby ASA (if indicated): n/a  [x] Pregnancy dated by: LMP c/w 6 week US  [x] Anatomy US: wnl  [] Patient added to BirthTracks  [x] 1hr GCT at 24-28wks: 98  [x] 3 hr GTT (if indicated): n/a  [x] Rhogam (if indicated): n/a  [] Fetal Surveillance (if indicated):  [x] Tdap (27-36wks): given  [] RSV (32-36wks) ( Sept. To end  ):  [x] Flu Shot: declined  [] COVID vaccine:   [] Breastfeeding:  [] Postpartum Birth control method:   [] GBS at 36 - 37 wks:  [] 39 weeks discussion of IOL vs. Expectant management:  [x] Mode of delivery ( anticipated ):          36 weeks gestation of pregnancy ([facility]) 2024 by ARVIN Dalal  2025 by ARVIN Dalal    Overview Signed 2024 11:39 AM by ARVIN Dalal     Desired provider in labor: [x] NEISHAM  [] Physician  [x] Initial BMI: 22  [x] Prenatal Labs: WNL  [x] Rh status: O+  [x] Genetic Screening: rrNIPS --> It's a boy!0  [x] NT US: WNL  [x] Baby ASA (if indicated): n/a  [x] Pregnancy dated by: LMP c/w 6 week US  [x] Anatomy US: wnl  [] Patient added to BirthTracks  [x] 1hr GCT at 24-28wks: 98  [x] 3 hr GTT (if indicated): n/a  [x] Rhogam (if indicated): n/a  [x] Fetal Surveillance (if indicated): n/a  [x] Tdap (27-36wks): given  [x] Flu Shot: declined  [] COVID vaccine:   [] Breastfeeding:  [] Postpartum Birth control method:   [] GBS at 36 - 37 wks: done   [] 39 weeks discussion of IOL vs. Expectant management: Counseled. Considering.  [x] Mode of delivery ( anticipated ):          34 weeks gestation of  pregnancy ([facility]) 2024 by ARVIN Dalal  2024 by ARVIN Dalal    Overview Signed 2024 12:53 PM by ARVIN Dalal     Desired provider in labor: [x] CNM  [] Physician  [x] Initial BMI: 22  [x] Prenatal Labs: WNL  [x] Rh status: O+  [x] Genetic Screening: rrNIPS --> It's a boy!0  [x] NT US: WNL  [x] Baby ASA (if indicated): n/a  [x] Pregnancy dated by: LMP c/w 6 week US  [x] Anatomy US: wnl  [] Patient added to BirthTracks  [x] 1hr GCT at 24-28wks: 98  [x] 3 hr GTT (if indicated): n/a  [x] Rhogam (if indicated): n/a  [x] Fetal Surveillance (if indicated): growth scan ordered for S>D  [x] Tdap (27-36wks): given  [] RSV (32-36wks) ( Sept. To end of  ):  [x] Flu Shot: declined  [] COVID vaccine:   [] Breastfeeding:  [] Postpartum Birth control method:   [] GBS at 36 - 37 wks:  [] 39 weeks discussion of IOL vs. Expectant management:  [x] Mode of delivery ( anticipated ):          Bleeding from genital tract during pregnancy ([facility]) 2024 by Marlena Dutta RN  10/3/2024 by ARVIN Dalal    Vaginal bleeding during pregnancy ([facility]) 2024 by Marlena Dutta RN  10/3/2024 by ARVIN Dalal    Decreased fetal movement during pregnancy ([facility]) 2024 by Marlena Dutta RN  10/3/2024 by ARVIN Dalal    Urinary tract infection in mother during pregnancy ([facility]) 2024 by Marlena Dutta RN  10/3/2024 by Guerda L Stropko, APRN-CNM    Mild hyperemesis gravidarum ([facility]) 2024 by Marlena Dutta RN  10/3/2024 by JESSIE Dalal-JOVANA              PP Recovery:   Pain: controlled  Lacerations: periurethral  Perineal discomfort:   Lochia: resolve  Feeding Method: breast  Breast concerns: Patient reports palpable painful right breast lump approximately the size of a pea.  Patient notes when pushing on lump she gets a shocking sensation down to her right fingertips.  Lactation Consult Needed?: No  Birth  Trauma: No  Bonding with Baby: well with baby boy, Berto  Sexual Intimacy: Yes, used condom for protection  Contraceptive Method: Condoms  Depression -patient reports feeling extremely overwhelmed at home.  Patient ended up taking in her significant other's 3 nephews after both of their parents  of drug overdose.  Patient states she has been having difficulty with behavioral problems with the children.  Patient reports not having a lot of help at home as her significant other works and then comes home in the kitchen until at 10 provide.  Patient currently under the care of of Lauren Hopkins through psychiatry and has appointment 3/23.  Patient currently on mirtazapine though states she thinks she may need to be started on an additional medication.  Patient denies any thoughts of suicidal or homicidal ideation.  Postpartum Depression: High Risk (10/30/2024)    [place]  Depression Scale     Last EPDS Total Score: 11     Last EPDS Self Harm Result: Never     Emotional Support -   Bowel Symptoms - Negative for abdominal discomfort, blood in stool or black stool, and negative change in bowel habits.  Bladder Symptoms - No dysuria, denies hematuria, urinary frequency, urinary urgency or incontinence.   Menses Since Delivery - yes    Physical Exam  HENT:      Mouth/Throat:      Mouth: Mucous membranes are moist.   Eyes:      Conjunctiva/sclera: Conjunctivae normal.   Neck:      Thyroid: No thyroid mass, thyromegaly or thyroid tenderness.   Cardiovascular:      Rate and Rhythm: Normal rate and regular rhythm.      Pulses: Normal pulses.   Pulmonary:      Effort: Pulmonary effort is normal.      Breath sounds: Normal breath sounds.   Chest:   Breasts:     Breasts are symmetrical.      Right: Normal.      Left: Normal.          Comments: Elongated pea size tender, firm, mobile mass/dense tissue noted 7 cm from nipple line at 1 O'clock  Abdominal:      General: Abdomen is flat.      Palpations: Abdomen is soft.       Hernia: There is no hernia in the left inguinal area or right inguinal area.   Genitourinary:     General: Normal vulva.      Uterus: Normal.       Adnexa: Right adnexa normal and left adnexa normal.   Musculoskeletal:         General: Normal range of motion.      Cervical back: Normal range of motion.   Lymphadenopathy:      Cervical: No cervical adenopathy.      Right cervical: No superficial, deep or posterior cervical adenopathy.     Left cervical: No superficial, deep or posterior cervical adenopathy.      Lower Body: No right inguinal adenopathy. No left inguinal adenopathy.   Skin:     General: Skin is warm.      Capillary Refill: Capillary refill takes less than 2 seconds.   Neurological:      Mental Status: She is alert and oriented to person, place, and time.   Psychiatric:         Mood and Affect: Mood normal.         Behavior: Behavior normal.          Plan      A/P. 29 y.o. now , s/p , normal recovery course  Last pap: 10/25/2022 nml hpv-  Postpartum contraception discussed - patient elects condoms  Depression: Patient has appointment with psychiatry next week.  Patient encouraged to reach out to see if she can be seen sooner.  Patient denies any current thoughts of self-harm, suicidal ideation, or homicidal ideation.    Painful right breast mass/dense tissue: diagnostic mammogram and ultrasound ordered of right breast    Returning to exercise and sex discussed. Patient is cleared. Strengthening pelvic floor with Kegels encouraged before high impact exercise to prevent weakening reviewed.   Encouraged patient to continue to monitor for signs or symptoms of  mood and anxiety disorders  Routine follow up with PCP for health maintenance examination encouraged including TSH, cholesterol and vitamin D evaluation.  Encouraged continued breastfeeding. Patient aware resources are available should she need them.   Warning s/s discussed  All questions answered    F/U  for routine annual exam  or sooner PRN    Mi Michelle, APRN-CNM

## 2025-03-18 ENCOUNTER — APPOINTMENT (OUTPATIENT)
Dept: OBSTETRICS AND GYNECOLOGY | Facility: CLINIC | Age: 30
End: 2025-03-18
Payer: COMMERCIAL

## 2025-04-23 ENCOUNTER — TELEPHONE (OUTPATIENT)
Dept: OBSTETRICS AND GYNECOLOGY | Facility: CLINIC | Age: 30
End: 2025-04-23
Payer: COMMERCIAL

## 2025-04-23 NOTE — TELEPHONE ENCOUNTER
14 week postpartum patient was sent to OB line c/o heavy bleeding, where she is saturating a pad very 2 hours and isn't sure what she should do at this point.  Is not on birth control due to having reactions.  This is her 3rd period since delivery.  Patient states at one point, when she stood up she felt a gush of blood, that saturated her pad and clothes.    She was advised to go to ED if she is saturating a pad every 1-2 hours but declined due to .      Patient was advised the ED will be able to evaluate her with that heavy of bleeding, potentially order labs and imaging if needed as well as possible medication to slow or stop her bleeding.    Patient again states she really wants to avoid the ED due to , she has a 3 month old an a toddler at home.    Patient assured I will send a message to in office CNM for further recommendations.  Secure message sent to on call JOVANA, Gin SHERIDAN as well.    Please advise

## 2025-04-29 NOTE — TELEPHONE ENCOUNTER
Called patient to get an update on bleeding. Left a message for her to call the office back.   Katty

## 2025-06-02 ENCOUNTER — APPOINTMENT (OUTPATIENT)
Dept: BEHAVIORAL HEALTH | Facility: CLINIC | Age: 30
End: 2025-06-02
Payer: COMMERCIAL

## 2025-06-02 DIAGNOSIS — Z3A.10 10 WEEKS GESTATION OF PREGNANCY (HHS-HCC): ICD-10-CM

## 2025-06-02 DIAGNOSIS — F41.9 ANXIETY: ICD-10-CM

## 2025-06-02 DIAGNOSIS — Z01.419 WELL WOMAN EXAM WITH ROUTINE GYNECOLOGICAL EXAM: ICD-10-CM

## 2025-06-02 PROCEDURE — 99214 OFFICE O/P EST MOD 30 MIN: CPT | Performed by: PSYCHIATRY & NEUROLOGY

## 2025-06-02 RX ORDER — FLUOXETINE 10 MG/1
10 CAPSULE ORAL DAILY
Qty: 30 CAPSULE | Refills: 2 | Status: SHIPPED | OUTPATIENT
Start: 2025-06-02 | End: 2025-08-31

## 2025-06-02 RX ORDER — MIRTAZAPINE 15 MG/1
15 TABLET, FILM COATED ORAL NIGHTLY
Qty: 90 TABLET | Refills: 0 | Status: SHIPPED | OUTPATIENT
Start: 2025-06-02 | End: 2025-08-31

## 2025-06-02 NOTE — PROGRESS NOTES
"                                                                               Outpatient Psychiatry     Virtual or Telephone Consent    An interactive audio and video telecommunication system which permits real time communications between the patient (at the originating site) and provider (at the distant site) was utilized to provide this telehealth service.   Verbal consent was requested and obtained from Kennedi Klein on this date, 06/02/25 for a telehealth visit and the patient's location was confirmed at the time of the visit.    Last appointment: 12/03/24 for a telehealth visit.      Subjective  Kennedi Klein, a 29 y.o. female, presenting to Psychiatry for evaluation.  Patient is referred by No primary care provider on file.         Patient had her son in January  Went back to Remeron 15 mg dose - end of February  Depression is better but anxiety is worse   Anxiety and \"rage\" - cannot even open door to check mail  Buspar and Ativan in past  Buspar made anxiety worse  Has not been leaving house due to anxiety  Can go with claudia Taylor had to go to [place] for three months for work - came back end of April  Claudia gets \"aggravated\" with her and his family does not understand   His children ages 11, 14 and 16 - live with them as well  11 year old has behavior problems and mental health issues  Has been on many other medications in the past but does not remember the names except for Zoloft which she did not do well on   She was referred to this provider due to history of severe josiah (post) partum depression after the birth of her daughter  Brother who was 38 passed away November 2023 from complications of ETOH abuse   Lives with DARIAN and his three brothers ages 10, 14 and 16 as well as her 1 year old daughter  His mother passed away recently which has been stressful for the family  Gets \"overstimulated\" which \"turns into anger\" which occurs two to three times a day and she will separate herself from the family   Has " "been happening for over a year  Not speaking to her mother at this time due to her mom's mental health  Dose has been the same and she denies any side effects    Denies any depressive symptoms at this time  Sleep is up and down as well as appetite are fine  Patient denies SI, HI, manic or psychotic symptoms.        Psychiatric Review Of Systems:  Depressive Symptoms: negative but reports periods of anger and rage   Manic Symptoms: negative  Anxiety Symptoms: reports significant anxiety and has not been able to leave the house -does not like being in crowds  OCD:  obsessions and/or compulsions denies   Panic Disorder: denies  Social Anxiety: does not like going out in crowds  Psychotic Symptoms: negative  Other Symptoms:   Sleep: up and down   Appetite: up and down  SI/HI: denies     Current Medications:    Current Medications      Current Outpatient Medications:     albuterol 90 mcg/actuation inhaler, Inhale 2 puffs every 6 hours if needed for wheezing., Disp: 18 g, Rfl: 11    metoclopramide (Reglan) 10 mg tablet, Take 1 tablet (10 mg) by mouth 4 times a day for 10 days., Disp: 40 tablet, Rfl: 2    mirtazapine (Remeron) 15 mg tablet, Take 1 tablet (15 mg) by mouth once daily at bedtime., Disp: 30 tablet, Rfl: 11    prenatal vit,khanh 74-iron-folic 27 mg iron- 1 mg tablet, Take 1 tablet by mouth once daily., Disp: 30 each, Rfl: 11        Medical History:  Medical History        Past Medical History:   Diagnosis Date    Anxiety      Chlamydia infection affecting pregnancy in first trimester ([facility]) 05/17/2024     Azithromycin Rx sent 5/17.  Collect test of cure at next appointment.  Patient for third trimester STI screening      Depression      Seasonal allergies              Head trauma  none  Seizures none   Thyroid none      Past Psychiatric History:   Diagnoses:  had \"behavioral issues\" when she was younger, diagnosed with BAD in past, PPD after birth of daughter (severe weight loss), anxiety and depression  "   Previous Psychiatrist:  at least ten years ago   Therapy/past treatments:  [facility]   Current psychiatric medications:   Remeron 15 mg PO at bedtime   Past psychiatric medications:  Zoloft - had SI and increased depression - within last three years; Lexapro   Hospitalizations:   none   Suicide attempts:  none   Family psychiatric history:  mom BPD, BAD, Panic Disorder with agorophobia; brother ETOH;       Social History:   Currently lives: with BF, his three brothers and her one year old daughter  Grew up in [place]  Moved to [place] to be near brother   Education:  HS in [place]   Work/Finances: unknown  Family of origin:  younger brother in  and lives in [place] and older brother; mom and dad live in [facility]    Marital history/children:  lives with BF and his three brothers, one year old daughter   Current stressors: busy household  Social support:  BF  Legal History:  unknown     History: none   History of violence:   none   Access to Weapons:  none   Interests: unknown     Substance Use History:  Tobacco use: none   Use of alcohol: denied  Use of caffeine: one mountatin dew  Use of other substances:  none   Legal consequences of substance use: n/a   Substance use disorder treatment:  n/a     Record Review: moderate  Reviewed notes dating back to birth of her daughter in 2023 for more information about her depression and medications she has been on     Medical Review Of Systems:  Pertinent items are noted in HPI.     OARRS:  Reviewed - no history noted of any controlled substances     Objective  Mental Status Exam  Appearance: casually dressed, fair g/h  Attitude: Calm, cooperative, and engaged in conversation.  Behavior: Appropriate eye contact.   Motor Activity: No psychomotor agitation or retardation. No abnormal movements, tremors or tics. No evidence of extrapyramidal symptoms or tardive dyskinesia.  Speech: Regular rate, rhythm, volume. Spontaneous, no pressured  "speech.  Mood: \"anxious\"  Affect: Anxious, full range, mood congruent.  Thought Process: Linear, logical, and goal-directed. No loose associations or gross thought disorganization.  Thought Content: Denied current suicidal ideation or thoughts of harm to self, denied homicidal ideation or thoughts of harm to others. No delusional thinking elicited. No perseverations or obsessions identified.   Perception: Did not endorse auditory or visual hallucinations, did not appear to be responding to hallucinatory stimuli.   Cognition: Alert, oriented x3. Preserved attention span and concentration, recent and remote memory. Adequate fund of knowledge. No deficits in language.   Insight: Fair, in regards to understanding mental health condition  Judgement: Fair        Vitals:  There were no vitals filed for this visit.    BMI: 26.08     Falls Risk: n/a         Risk Assessment:  Risk of harm to self: Low Risk -- Risk factors include: Depression Protective factors include:Denies current suicidal ideation, Denies history of suicide attempts , Future-oriented talk , Willingness to seek help and support , Skills in problem solving, conflict resolution, and nonviolent handling of disputes, Access to a variety of clinical interventions , History of adhering to treatment recommendations and/or prescribed medication regimen , Support through ongoing medical and mental healthcare relationships , Current/history of good response to treatment/meds , Interpersonal relationships and supports, e.g., family, friends, peers, community , and Restricted access to firearms or other lethal means of suicide      Risk of harm to others: Low Risk - Risk factors include: No significant risk factors identified on screening. Protective factors include: Lack of known history of harm to others , Lack of known history of violent ideation , Lack of known access to firearms , Sense of community, availability/access to resources and support , Sense of " optimism, hope , Interpersonal competence , Affect regulation , Sense of self-efficacy, internal locus of control , and Positive, pro-social family/peer network         DXS:   MDD, severe, in remission   History of BAD per chart   Anxiety, unspecified  History of Anne-Marie Partum Depression      Assessment:  Patient is a 29 year old female who delivered her second child (a boy) 1/2025.  She has a history of severe post partum depression but currently denies any depressive symptoms at this time.  She does report significant anxiety and has been unable to leave the house.  She also reports episodes during which she becomes very enraged and angry.  She also is currently stressed due to the fact that her fiance's three children live with them and the 11 year old has both behavior and mental health issues. She is agreeable to starting Prozac 10 mg PO daily for the anxiety.  Feels the Remeron has helped with her depression.     Discussed medication risks and benefits      Patient denies SI, HI, manic or psychotic symptoms.  No side effects reported.        Plan/Recommendations:  Medications: continue Remeron 15 mg PO qhs  Start Prozac 10 mg PO daily   Follow up: 3-4 weeks  Call  Psychiatry at [phone] with issues.  For [facility] residents, [place] is a 24/7 hotline you can call for assistance [[phone]]. Please call [phone] or go to your closest Emergency Room if you feel unsafe. This includes thoughts of hurting yourself or anyone else, or having other troubles such as hearing voices, seeing visions, or having new and scary thoughts about the people around you.     Review with patient: Treatment plan reviewed with the patient.  Medication risks/benefit reviewed with the patient

## 2025-06-06 NOTE — PROGRESS NOTES
History: Kennedi is here for her right knee.    Past medical history: Multiple  Medications: Multiple  Allergies: No known drug allergies    Please refer to the intake H&P regarding the patient's review of systems, family history and social history as was done today    HEENT: Normal  Lungs: Clear to auscultation  Heart: RRR  Abdomen: Soft, nontender  Skin: clear  Extremity: []   Contralateral exam is normal for strength, motion, stability and neurovascular assessment.    Radiographs: []     Assessment: []     Plan: []   All questions were answered today with the patient.    Scribe Attestation:  By signing my name below, I, Bree Martinez attest that this documentation has been prepared under the direction and in the presence of Herber Mercado MD.    Provider Attestation - Scribe documentation:  All medical record entries made by Sydney Ibarra were at my direction and personally dictated by me, Herber Mercado MD. I have reviewed the chart and agree that the record is accurate and I confirmed that it reflects my personal performance of the history, physical exam, discussion, and plan.

## 2025-06-09 ENCOUNTER — APPOINTMENT (OUTPATIENT)
Dept: ORTHOPEDIC SURGERY | Facility: CLINIC | Age: 30
End: 2025-06-09
Payer: COMMERCIAL

## 2025-06-26 ENCOUNTER — APPOINTMENT (OUTPATIENT)
Dept: BEHAVIORAL HEALTH | Facility: CLINIC | Age: 30
End: 2025-06-26
Payer: COMMERCIAL

## 2025-06-26 DIAGNOSIS — Z3A.10 10 WEEKS GESTATION OF PREGNANCY (HHS-HCC): ICD-10-CM

## 2025-06-26 DIAGNOSIS — F41.9 ANXIETY: ICD-10-CM

## 2025-06-26 DIAGNOSIS — Z01.419 WELL WOMAN EXAM WITH ROUTINE GYNECOLOGICAL EXAM: ICD-10-CM

## 2025-06-26 RX ORDER — MIRTAZAPINE 15 MG/1
15 TABLET, FILM COATED ORAL NIGHTLY
Qty: 90 TABLET | Refills: 0 | Status: SHIPPED | OUTPATIENT
Start: 2025-06-26 | End: 2025-09-24

## 2025-06-30 ENCOUNTER — APPOINTMENT (OUTPATIENT)
Dept: BEHAVIORAL HEALTH | Facility: CLINIC | Age: 30
End: 2025-06-30
Payer: COMMERCIAL

## 2025-07-03 ENCOUNTER — TELEPHONE (OUTPATIENT)
Dept: BEHAVIORAL HEALTH | Facility: CLINIC | Age: 30
End: 2025-07-03

## 2025-07-03 ENCOUNTER — APPOINTMENT (OUTPATIENT)
Dept: BEHAVIORAL HEALTH | Facility: CLINIC | Age: 30
End: 2025-07-03
Payer: COMMERCIAL

## 2025-07-03 DIAGNOSIS — F41.9 ANXIETY: ICD-10-CM

## 2025-07-03 PROCEDURE — 1036F TOBACCO NON-USER: CPT | Performed by: PSYCHIATRY & NEUROLOGY

## 2025-07-03 PROCEDURE — 99214 OFFICE O/P EST MOD 30 MIN: CPT | Performed by: PSYCHIATRY & NEUROLOGY

## 2025-07-03 RX ORDER — HYDROXYZINE PAMOATE 25 MG/1
25 CAPSULE ORAL 3 TIMES DAILY PRN
Qty: 90 CAPSULE | Refills: 0 | Status: SHIPPED | OUTPATIENT
Start: 2025-07-03 | End: 2025-07-03 | Stop reason: ENTERED-IN-ERROR

## 2025-07-03 RX ORDER — HYDROXYZINE HYDROCHLORIDE 25 MG/1
25 TABLET, FILM COATED ORAL 3 TIMES DAILY
Qty: 90 TABLET | Refills: 0 | Status: SHIPPED | OUTPATIENT
Start: 2025-07-03 | End: 2025-08-02

## 2025-07-03 NOTE — PROGRESS NOTES
"                                                                          Outpatient Psychiatry    Virtual or Telephone Consent    An interactive audio and video telecommunication system which permits real time communications between the patient (at the originating site) and provider (at the distant site) was utilized to provide this telehealth service.   Verbal consent was requested and obtained from Kennedi Klein on this date, 07/03/25 for a telehealth visit and the patient's location was confirmed at the time of the visit.     Last appointment: 06/02/25 for a telehealth visit and the patient's location was confirmed at the time of the visit.     Subjective  Kennedi Klein, a 29 y.o. female, presenting to Psychiatry for evaluation.  Patient is referred by No primary care provider on file.      The patient reports that she completely forgot that she took Prozac during her first pregnancy and did not like it  Tried a few doses and it made her anxiety worse  Patient had her son in January  Went back to Remeron 15 mg dose - end of February  Depression is better but anxiety is still prevalent   Reports anxiety and \"rage\"   Has not been leaving house due to anxiety  Chest starts to feel heavy, starts sweating and feels like she I going to cry  Just moved which was overwhelming  Has to walk away - does not want to get angry with kids  Happens more frequently prior to getting her period   Oldest - college classes in Dec and will be working  School begins end of August - kids are switching schools   Had [facility] evaluation - 11 year old - July 23 seeing psychiatrist   Gets \"overstimulated\" which \"turns into anger\" which occurs two to three times a day and she will separate herself from the family   Has been happening for over a year now  Denies any depressive symptoms at this time  Sleep is up and down as well as appetite are fine  Patient denies SI, HI, manic or psychotic symptoms.        Psychiatric Review Of " "Systems:  Depressive Symptoms: negative but reports periods of anger and rage   Manic Symptoms: negative  Anxiety Symptoms: reports significant anxiety and has not been able to leave the house -does not like being in crowds  OCD:  obsessions and/or compulsions denies   Panic Disorder: denies  Social Anxiety: does not like going out in crowds  Psychotic Symptoms: negative  Other Symptoms:   Sleep: up and down   Appetite: up and down  SI/HI: denies           Current Outpatient Medications:     albuterol 90 mcg/actuation inhaler, Inhale 2 puffs every 6 hours if needed for wheezing., Disp: 18 g, Rfl: 11    metoclopramide (Reglan) 10 mg tablet, Take 1 tablet (10 mg) by mouth 4 times a day for 10 days., Disp: 40 tablet, Rfl: 2    mirtazapine (Remeron) 15 mg tablet, Take 1 tablet (15 mg) by mouth once daily at bedtime., Disp: 30 tablet, Rfl: 11    prenatal vit,khanh 74-iron-folic 27 mg iron- 1 mg tablet, Take 1 tablet by mouth once daily., Disp: 30 each, Rfl: 11         Medical History:  Medical History           Past Medical History:   Diagnosis Date    Anxiety      Chlamydia infection affecting pregnancy in first trimester ([facility]) 05/17/2024     Azithromycin Rx sent 5/17.  Collect test of cure at next appointment.  Patient for third trimester STI screening      Depression      Seasonal allergies              Head trauma  none  Seizures none   Thyroid none      Past Psychiatric History:   Diagnoses:  had \"behavioral issues\" when she was younger, diagnosed with BAD in past, PPD after birth of daughter (severe weight loss), anxiety and depression    Previous Psychiatrist:  at least ten years ago   The[facility]/past treatments:  [facility]   Current psychiatric medications:   Remeron 15 mg PO at bedtime   Past psychiatric medications:  Zoloft - had SI and increased depression - within last three years; Lexapro; Prozac - increased anxiety: Buspar and Ativan - did not like    Hospitalizations:   none   Suicide attempts:  none " "  Family psychiatric history:  mom BPD, BAD, Panic Disorder with agorophobia; brother ETOH;       Social History:   Currently lives: with BF, his three brothers (ages 11, 14 and 16) and her two old daughter and baby born   Grew up in [place]  Moved to [place] to be near brother   Education:  HS in [place]   Work/Finances: unknown  Family of origin:  younger brother in  and lives in [place] and older brother; mom and dad live in [facility]; brother passed away in 2023 at age of 38 from complications of ETOH     Marital history/children:  lives with BF and his three brothers, one year old daughter   Current stressors: busy household  Social support:  BF  Legal History:  unknown     History: none   History of violence:   none   Access to Weapons:  none   Interests: unknown     Substance Use History:  Tobacco use: none   Use of alcohol: denied  Use of caffeine: one mountatin dew  Use of other substances:  none   Legal consequences of substance use: n/a   Substance use disorder treatment:  n/a     Record Review: moderate  Reviewed notes dating back to birth of her daughter in 2023 for more information about her depression and medications she has been on     Medical Review Of Systems:  Pertinent items are noted in HPI.     OARRS:  Reviewed - no history noted of any controlled substances     Objective  Mental Status Exam  Appearance: casually dressed, fair g/h  Attitude: Calm, cooperative, and engaged in conversation.  Behavior: Appropriate eye contact.   Motor Activity: No psychomotor agitation or retardation. No abnormal movements, tremors or tics. No evidence of extrapyramidal symptoms or tardive dyskinesia.  Speech: Regular rate, rhythm, volume. Spontaneous, no pressured speech.  Mood: \"anxious\"  Affect: Anxious, full range, mood congruent.  Thought Process: Linear, logical, and goal-directed. No loose associations or gross thought disorganization.  Thought Content: Denied current suicidal " ideation or thoughts of harm to self, denied homicidal ideation or thoughts of harm to others. No delusional thinking elicited. No perseverations or obsessions identified.   Perception: Did not endorse auditory or visual hallucinations, did not appear to be responding to hallucinatory stimuli.   Cognition: Alert, oriented x3. Preserved attention span and concentration, recent and remote memory. Adequate fund of knowledge. No deficits in language.   Insight: Fair, in regards to understanding mental health condition  Judgement: Fair        Vitals:  There were no vitals filed for this visit.     BMI: 26.08     Falls Risk: n/a         Risk Assessment:  Risk of harm to self: Low Risk -- Risk factors include: Depression Protective factors include:Denies current suicidal ideation, Denies history of suicide attempts , Future-oriented talk , Willingness to seek help and support , Skills in problem solving, conflict resolution, and nonviolent handling of disputes, Access to a variety of clinical interventions , History of adhering to treatment recommendations and/or prescribed medication regimen , Support through ongoing medical and mental healthcare relationships , Current/history of good response to treatment/meds , Interpersonal relationships and supports, e.g., family, friends, peers, community , and Restricted access to firearms or other lethal means of suicide      Risk of harm to others: Low Risk - Risk factors include: No significant risk factors identified on screening. Protective factors include: Lack of known history of harm to others , Lack of known history of violent ideation , Lack of known access to firearms , Sense of community, availability/access to resources and support , Sense of optimism, hope , Interpersonal competence , Affect regulation , Sense of self-efficacy, internal locus of control , and Positive, pro-social family/peer network         DXS:   MDD, severe, in remission   History of BAD per chart    Anxiety, unspecified  History of Anne-Marie Partum Depression      Assessment:  Patient is a 29 year old female who delivered her second child (a boy) 1/2025.  She has a history of severe post partum depression but currently denies any depressive symptoms at this time.  She does report significant anxiety and has been unable to leave the house.  She also reports episodes during which she becomes very enraged and angry.  She also is currently stressed due to the fact that her fiance's three children live with them and the 11 year old has both behavior and mental health issues. She did not do well on the Prozac which she now recalls she took in the past.  Patient is agreeable to trial of hydroxyzine 25 mg PO TID PRN anxiety.      Feels the Remeron has helped with her depression.      Discussed medication risks and benefits      Patient denies SI, HI, manic or psychotic symptoms.  No side effects reported.        Plan/Recommendations:  Medications: continue Remeron 15 mg PO qhs  Start hydroxyzine 25 mg PO TID PRN anxiety   Discontinue Prozac 10 mg PO daily   Follow up: 3-4 weeks  Call  Psychiatry at [phone] with issues.  For [facility] residents, [place] is a 24/7 hotline you can call for assistance [[phone]]. Please call [phone] or go to your closest Emergency Room if you feel unsafe. This includes thoughts of hurting yourself or anyone else, or having other troubles such as hearing voices, seeing visions, or having new and scary thoughts about the people around you.     Review with patient: Treatment plan reviewed with the patient.  Medication risks/benefit reviewed with the patient

## 2025-07-28 ENCOUNTER — APPOINTMENT (OUTPATIENT)
Dept: BEHAVIORAL HEALTH | Facility: CLINIC | Age: 30
End: 2025-07-28
Payer: COMMERCIAL

## 2025-07-28 DIAGNOSIS — Z01.419 WELL WOMAN EXAM WITH ROUTINE GYNECOLOGICAL EXAM: ICD-10-CM

## 2025-07-28 DIAGNOSIS — F41.9 ANXIETY: ICD-10-CM

## 2025-07-28 PROCEDURE — 99214 OFFICE O/P EST MOD 30 MIN: CPT | Performed by: PSYCHIATRY & NEUROLOGY

## 2025-07-28 RX ORDER — MIRTAZAPINE 15 MG/1
15 TABLET, FILM COATED ORAL NIGHTLY
Qty: 90 TABLET | Refills: 0 | Status: SHIPPED | OUTPATIENT
Start: 2025-07-28 | End: 2025-10-26

## 2025-07-28 NOTE — PROGRESS NOTES
SUBJECTIVE:  Melly Monte is a 29 y.o. female who presents here today for complaints of:      Chief Complaint   Patient presents with    Amenorrhea     Positive [id number]     Amenorrhea, Confirmation of Pregnancy  Home Pregnancy Test:  Positive  Patient's last menstrual period was 06/16/2025 (exact date).  Pregnancy Symptoms:  Missed Period, Tender Breasts, Nausea & Vomiting, Urinary Frequency, Fatigue  Pelvic pain/cramping:   No  Vaginal bleeding: Intermittent spotting    Gynecological History  Prior Pap History:    3/23/2020: NILM, HPV Negative   Pap deferred today due to spotting    Review of Systems   Constitutional:  Positive for appetite change and fatigue.   Eyes:  Negative for visual disturbance.   Respiratory:  Negative for shortness of breath.    Gastrointestinal:  Positive for nausea and vomiting. Negative for abdominal pain, constipation and diarrhea.   Genitourinary:  Positive for frequency. Negative for dysuria, pelvic pain, vaginal bleeding and vaginal discharge.   Musculoskeletal:  Positive for gait problem.   Neurological:  Negative for dizziness, syncope and headaches.     OBJECTIVE:  Vitals:  BP (!) 100/52 (BP Site: Right Upper Arm, Patient Position: Sitting, BP Cuff Size: Medium Adult)   Pulse 80   Ht 1.664 m (5' 5.5\")   Wt 65.3 kg (144 lb)   LMP 06/16/2025 (Exact Date)   BMI 23.60 kg/m²     Physical Exam  Vitals and nursing note reviewed.   Constitutional:       General: She is not in acute distress.     Appearance: Normal appearance. She is not ill-appearing, toxic-appearing or diaphoretic.   HENT:      Nose: No congestion or rhinorrhea.   Eyes:      General: No scleral icterus.        Right eye: No discharge.         Left eye: No discharge.   Pulmonary:      Effort: No respiratory distress.   Abdominal:      Hernia: There is no hernia in the left inguinal area or right inguinal area.   Genitourinary:     General: Normal vulva.      Labia:         Right: No rash, tenderness, lesion or

## 2025-07-28 NOTE — PROGRESS NOTES
"                                                                                Outpatient Psychiatry     Virtual or Telephone Consent    An interactive audio and video telecommunication system which permits real time communications between the patient (at the originating site) and provider (at the distant site) was utilized to provide this telehealth service.   Verbal consent was requested and obtained from Kennedi Klein on this date, 07/28/25 for a telehealth visit and the patient's location was confirmed at the time of the visit.    Last appointment: 07/03/25 for a telehealth visit and the patient's location was confirmed at the time of the visit.       Subjective  Kennedi Klein, a 29 y.o. female, presenting to Psychiatry for evaluation.  Patient is referred by No primary care provider on file.       Patient said she is doing \"well\" but she is exhausted due to summer break and all of the kids are all home.    The family Just moved - bought a house and no longer renting.  They are settled in but need to decorate.  They are going to renovate the full basement and make bedrooms for the boys.    Patient just found out she is pregnant and she is very excited.  She stopped the hydroxyzine when she found out but is continuing the Remeron 15 mg PO qhs.   Patient is due around 3/22/2026.  She feels like she has increased anxiety since getting pregnant and she feels she gets \"more stimulated and overwhelmed\" since getting pregnant  Patient has been leaving the house but she has to force herself to leave.    School begins at the end of August so things conrado be more calm.   The kids are switching schools   Denies any depressive symptoms at this time  Takes Remeron at 8 pm - falls asleep around 11 pm  Sleep is up and down   appetite is fine  Looking at boarding school for 11 year old - [employer] in [place]   His father is away a lot for work so the responsibility to take care of his kids falls on her  Having appointment with OB " "tomorrow   Patient denies SI, HI, manic or psychotic symptoms.     Psychiatric Review Of Systems:  Depressive Symptoms: negative but reports periods of anger and rage   Manic Symptoms: negative  Anxiety Symptoms: reports significant anxiety - has been able to leave the house   OCD:  obsessions and/or compulsions denies   Panic Disorder: denies  Social Anxiety: does not like going out in crowds  Psychotic Symptoms: negative  Other Symptoms:   Sleep: up and down   Appetite: up and down  SI/HI: denies            Current Outpatient Medications:     albuterol 90 mcg/actuation inhaler, Inhale 2 puffs every 6 hours if needed for wheezing., Disp: 18 g, Rfl: 11    metoclopramide (Reglan) 10 mg tablet, Take 1 tablet (10 mg) by mouth 4 times a day for 10 days., Disp: 40 tablet, Rfl: 2    mirtazapine (Remeron) 15 mg tablet, Take 1 tablet (15 mg) by mouth once daily at bedtime., Disp: 30 tablet, Rfl: 11    prenatal vit,khanh 74-iron-folic 27 mg iron- 1 mg tablet, Take 1 tablet by mouth once daily., Disp: 30 each, Rfl: 11         Medical History:  Medical History           Past Medical History:   Diagnosis Date    Anxiety      Chlamydia infection affecting pregnancy in first trimester ([facility]) 05/17/2024     Azithromycin Rx sent 5/17.  Collect test of cure at next appointment.  Patient for third trimester STI screening      Depression      Seasonal allergies              Head trauma  none  Seizures none   Thyroid none      Past Psychiatric History:   Diagnoses:  had \"behavioral issues\" when she was younger, diagnosed with BAD in past, PPD after birth of daughter (severe weight loss), anxiety and depression    Previous Psychiatrist:  at least ten years ago   Therapy/past treatments:  [facility]   Current psychiatric medications:   Remeron 15 mg PO at bedtime   Past psychiatric medications:  Zoloft - had SI and increased depression - within last three years; Lexapro; Prozac - increased anxiety: Buspar and Ativan - did not like  " "  Hospitalizations:   none   Suicide attempts:  none   Family psychiatric history:  mom BPD, BAD, Panic Disorder with agorophobia; brother ETOH;       Social History:   Currently lives: with BF, his three brothers (ages 11, 14 and 16) and her two old daughter and baby born - girl age two and 7 months boy;   Grew up in [place]  Moved to [place] to be near brother   Education:  HS in [place]   Work/Finances: unknown  Family of origin:  younger brother in  and lives in [place] and older brother; mom and dad live in [facility]; brother passed away in 2023 at age of 38 from complications of ETOH     Marital history/children:  lives with BF and his three brothers, one year old daughter   Current stressors: busy household  Social support:  BF  Legal History:  unknown     History: none   History of violence:   none   Access to Weapons:  none   Interests: unknown     Substance Use History:  Tobacco use: none   Use of alcohol: denied  Use of caffeine: one mountatin dew  Use of other substances:  none   Legal consequences of substance use: n/a   Substance use disorder treatment:  n/a     Record Review: moderate  Reviewed notes dating back to birth of her daughter in 2023 for more information about her depression and medications she has been on     Medical Review Of Systems:  Pertinent items are noted in HPI.     OARRS:  Reviewed - no history noted of any controlled substances     Objective  Mental Status Exam  Appearance: casually dressed, fair g/h  Attitude: Calm, cooperative, and engaged in conversation.  Behavior: Appropriate eye contact.   Motor Activity: No psychomotor agitation or retardation. No abnormal movements, tremors or tics. No evidence of extrapyramidal symptoms or tardive dyskinesia.  Speech: Regular rate, rhythm, volume. Spontaneous, no pressured speech.  Mood: \"anxious\"  Affect: Anxious, full range, mood congruent.  Thought Process: Linear, logical, and goal-directed. No loose " associations or gross thought disorganization.  Thought Content: Denied current suicidal ideation or thoughts of harm to self, denied homicidal ideation or thoughts of harm to others. No delusional thinking elicited. No perseverations or obsessions identified.   Perception: Did not endorse auditory or visual hallucinations, did not appear to be responding to hallucinatory stimuli.   Cognition: Alert, oriented x3. Preserved attention span and concentration, recent and remote memory. Adequate fund of knowledge. No deficits in language.   Insight: Fair, in regards to understanding mental health condition  Judgement: Fair        Vitals:  There were no vitals filed for this visit.     BMI: 26.08     Falls Risk: n/a         Risk Assessment:  Risk of harm to self: Low Risk -- Risk factors include: Depression Protective factors include:Denies current suicidal ideation, Denies history of suicide attempts , Future-oriented talk , Willingness to seek help and support , Skills in problem solving, conflict resolution, and nonviolent handling of disputes, Access to a variety of clinical interventions , History of adhering to treatment recommendations and/or prescribed medication regimen , Support through ongoing medical and mental healthcare relationships , Current/history of good response to treatment/meds , Interpersonal relationships and supports, e.g., family, friends, peers, community , and Restricted access to firearms or other lethal means of suicide      Risk of harm to others: Low Risk - Risk factors include: No significant risk factors identified on screening. Protective factors include: Lack of known history of harm to others , Lack of known history of violent ideation , Lack of known access to firearms , Sense of community, availability/access to resources and support , Sense of optimism, hope , Interpersonal competence , Affect regulation , Sense of self-efficacy, internal locus of control , and Positive, pro-social  family/peer network         DXS:   MDD, severe, in remission   History of BAD per chart   Anxiety, unspecified  History of Anne-Marie Partum Depression      Assessment:  Patient is a 29 year old female who is again pregnant and is due 3/2026.  She stopped the hydroxyzine when she found out she was pregnant.  Patient feels anxiety has been heightened since getting pregnant.  Summer is very stressful because all of the children are home.  She has been able to leave the house but has to force herself to do so.     Feels the Remeron has helped with her depression and she took it during her last pregnancy.       Patient denies SI, HI, manic or psychotic symptoms.  No side effects reported.        Plan/Recommendations:  Continue Remeron 15 mg PO qhs  Discontinue hydroxyzine 25 mg PO TID PRN anxiety    Follow up: 8 weeks  Call  Psychiatry at [phone] with issues.  For [facility] residents, [employer] is a 24/7 hotline you can call for assistance [[phone]]. Please call [phone] or go to your closest Emergency Room if you feel unsafe. This includes thoughts of hurting yourself or anyone else, or having other troubles such as hearing voices, seeing visions, or having new and scary thoughts about the people around you.     Review with patient: Treatment plan reviewed with the patient.  Medication risks/benefit reviewed with the patient

## 2025-07-29 ENCOUNTER — OFFICE VISIT (OUTPATIENT)
Dept: OBGYN CLINIC | Age: 30
End: 2025-07-29
Payer: COMMERCIAL

## 2025-07-29 VITALS
SYSTOLIC BLOOD PRESSURE: 100 MMHG | WEIGHT: 144 LBS | HEIGHT: 66 IN | HEART RATE: 80 BPM | BODY MASS INDEX: 23.14 KG/M2 | DIASTOLIC BLOOD PRESSURE: 52 MMHG

## 2025-07-29 DIAGNOSIS — O21.9 NAUSEA AND VOMITING IN PREGNANCY: ICD-10-CM

## 2025-07-29 DIAGNOSIS — Z32.01 POSITIVE PREGNANCY TEST: ICD-10-CM

## 2025-07-29 DIAGNOSIS — Z34.81 PRENATAL CARE, SUBSEQUENT PREGNANCY IN FIRST TRIMESTER: ICD-10-CM

## 2025-07-29 DIAGNOSIS — N91.2 AMENORRHEA: ICD-10-CM

## 2025-07-29 DIAGNOSIS — N91.2 AMENORRHEA: Primary | ICD-10-CM

## 2025-07-29 LAB
BACTERIA URNS QL MICRO: ABNORMAL /HPF
BASOPHILS # BLD: 0 K/UL (ref 0–0.2)
BASOPHILS NFR BLD: 0.3 %
BILIRUB UR QL STRIP: NEGATIVE
CLARITY UR: ABNORMAL
COLOR UR: YELLOW
EOSINOPHIL # BLD: 0.1 K/UL (ref 0–0.7)
EOSINOPHIL NFR BLD: 1.4 %
EPI CELLS #/AREA URNS AUTO: ABNORMAL /HPF (ref 0–5)
ERYTHROCYTE [DISTWIDTH] IN BLOOD BY AUTOMATED COUNT: 11.9 % (ref 11.5–14.5)
GLUCOSE UR STRIP-MCNC: NEGATIVE MG/DL
GONADOTROPIN, CHORIONIC (HCG) QUANT: NORMAL MIU/ML
HBV SURFACE AG SERPL QL IA: NORMAL
HCT VFR BLD AUTO: 38.5 % (ref 37–47)
HGB BLD-MCNC: 12.6 G/DL (ref 12–16)
HGB UR QL STRIP: ABNORMAL
HYALINE CASTS #/AREA URNS LPF: ABNORMAL /LPF (ref 0–5)
KETONES UR STRIP-MCNC: ABNORMAL MG/DL
LEUKOCYTE ESTERASE UR QL STRIP: ABNORMAL
LYMPHOCYTES # BLD: 1.9 K/UL (ref 1–4.8)
LYMPHOCYTES NFR BLD: 30 %
MCH RBC QN AUTO: 30.3 PG (ref 27–31.3)
MCHC RBC AUTO-ENTMCNC: 32.7 % (ref 33–37)
MCV RBC AUTO: 92.5 FL (ref 79.4–94.8)
MONOCYTES # BLD: 0.5 K/UL (ref 0.2–0.8)
MONOCYTES NFR BLD: 7.7 %
NEUTROPHILS # BLD: 3.9 K/UL (ref 1.4–6.5)
NEUTS SEG NFR BLD: 60.3 %
NITRITE UR QL STRIP: NEGATIVE
PH UR STRIP: 5.5 [PH] (ref 5–9)
PLATELET # BLD AUTO: 260 K/UL (ref 130–400)
PROT UR STRIP-MCNC: 30 MG/DL
RBC # BLD AUTO: 4.16 M/UL (ref 4.2–5.4)
RBC #/AREA URNS AUTO: ABNORMAL /HPF (ref 0–5)
RUBELLA ANTIBODY IGG: 20 IU/ML
SP GR UR STRIP: 1.03 (ref 1–1.03)
TSH SERPL-MCNC: 2.34 UIU/ML (ref 0.44–3.86)
UROBILINOGEN UR STRIP-ACNC: 0.2 E.U./DL
WBC # BLD AUTO: 6.5 K/UL (ref 4.8–10.8)
WBC #/AREA URNS AUTO: ABNORMAL /HPF (ref 0–5)

## 2025-07-29 PROCEDURE — G8427 DOCREV CUR MEDS BY ELIG CLIN: HCPCS | Performed by: MIDWIFE

## 2025-07-29 PROCEDURE — 99204 OFFICE O/P NEW MOD 45 MIN: CPT | Performed by: MIDWIFE

## 2025-07-29 PROCEDURE — G8420 CALC BMI NORM PARAMETERS: HCPCS | Performed by: MIDWIFE

## 2025-07-29 PROCEDURE — 1036F TOBACCO NON-USER: CPT | Performed by: MIDWIFE

## 2025-07-29 RX ORDER — VITAMIN A, VITAMIN C, VITAMIN D-3, VITAMIN E, VITAMIN B-1, VITAMIN B-2, NIACIN, VITAMIN B-6, CALCIUM, IRON, ZINC, COPPER 4000; 120; 400; 22; 1.84; 3; 20; 10; 1; 12; 200; 27; 25; 2 [IU]/1; MG/1; [IU]/1; MG/1; MG/1; MG/1; MG/1; MG/1; MG/1; UG/1; MG/1; MG/1; MG/1; MG/1
1 TABLET ORAL DAILY
Qty: 90 TABLET | Refills: 3 | Status: SHIPPED | OUTPATIENT
Start: 2025-07-29

## 2025-07-29 RX ORDER — METOCLOPRAMIDE 10 MG/1
10 TABLET ORAL
Qty: 120 TABLET | Refills: 3 | Status: SHIPPED | OUTPATIENT
Start: 2025-07-29

## 2025-07-29 RX ORDER — PNV NO.95/FERROUS FUM/FOLIC AC 28MG-0.8MG
1 TABLET ORAL DAILY
Qty: 90 TABLET | Refills: 3 | Status: SHIPPED | OUTPATIENT
Start: 2025-07-29 | End: 2026-07-29

## 2025-07-29 RX ORDER — HYDROXYZINE HYDROCHLORIDE 25 MG/1
25 TABLET, FILM COATED ORAL 3 TIMES DAILY
COMMUNITY
Start: 2025-07-06

## 2025-07-29 SDOH — ECONOMIC STABILITY: FOOD INSECURITY: WITHIN THE PAST 12 MONTHS, YOU WORRIED THAT YOUR FOOD WOULD RUN OUT BEFORE YOU GOT MONEY TO BUY MORE.: SOMETIMES TRUE

## 2025-07-29 SDOH — ECONOMIC STABILITY: FOOD INSECURITY: WITHIN THE PAST 12 MONTHS, THE FOOD YOU BOUGHT JUST DIDN'T LAST AND YOU DIDN'T HAVE MONEY TO GET MORE.: SOMETIMES TRUE

## 2025-07-29 ASSESSMENT — PATIENT HEALTH QUESTIONNAIRE - PHQ9
5. POOR APPETITE OR OVEREATING: NOT AT ALL
2. FEELING DOWN, DEPRESSED OR HOPELESS: SEVERAL DAYS
SUM OF ALL RESPONSES TO PHQ QUESTIONS 1-9: 10
4. FEELING TIRED OR HAVING LITTLE ENERGY: NEARLY EVERY DAY
SUM OF ALL RESPONSES TO PHQ QUESTIONS 1-9: 10
1. LITTLE INTEREST OR PLEASURE IN DOING THINGS: MORE THAN HALF THE DAYS
10. IF YOU CHECKED OFF ANY PROBLEMS, HOW DIFFICULT HAVE THESE PROBLEMS MADE IT FOR YOU TO DO YOUR WORK, TAKE CARE OF THINGS AT HOME, OR GET ALONG WITH OTHER PEOPLE: VERY DIFFICULT
3. TROUBLE FALLING OR STAYING ASLEEP: NEARLY EVERY DAY
9. THOUGHTS THAT YOU WOULD BE BETTER OFF DEAD, OR OF HURTING YOURSELF: NOT AT ALL
8. MOVING OR SPEAKING SO SLOWLY THAT OTHER PEOPLE COULD HAVE NOTICED. OR THE OPPOSITE, BEING SO FIGETY OR RESTLESS THAT YOU HAVE BEEN MOVING AROUND A LOT MORE THAN USUAL: SEVERAL DAYS
6. FEELING BAD ABOUT YOURSELF - OR THAT YOU ARE A FAILURE OR HAVE LET YOURSELF OR YOUR FAMILY DOWN: NOT AT ALL
SUM OF ALL RESPONSES TO PHQ QUESTIONS 1-9: 10
SUM OF ALL RESPONSES TO PHQ QUESTIONS 1-9: 10
7. TROUBLE CONCENTRATING ON THINGS, SUCH AS READING THE NEWSPAPER OR WATCHING TELEVISION: NOT AT ALL

## 2025-07-29 ASSESSMENT — ENCOUNTER SYMPTOMS
DIARRHEA: 0
NAUSEA: 1
VOMITING: 1
SHORTNESS OF BREATH: 0
ABDOMINAL PAIN: 0
CONSTIPATION: 0

## 2025-07-30 ENCOUNTER — HOSPITAL ENCOUNTER (OUTPATIENT)
Dept: ULTRASOUND IMAGING | Age: 30
Discharge: HOME | End: 2025-08-01
Payer: COMMERCIAL

## 2025-07-30 ENCOUNTER — APPOINTMENT (OUTPATIENT)
Dept: ULTRASOUND IMAGING | Age: 30
End: 2025-07-30
Attending: MIDWIFE
Payer: COMMERCIAL

## 2025-07-30 DIAGNOSIS — Z34.81 PRENATAL CARE, SUBSEQUENT PREGNANCY IN FIRST TRIMESTER: ICD-10-CM

## 2025-07-30 DIAGNOSIS — N91.2 AMENORRHEA: ICD-10-CM

## 2025-07-30 LAB
ABO/RH: NORMAL
ANTIBODY SCREEN: NORMAL
CLUE CELLS VAG QL WET PREP: NORMAL
ESTIMATED AVERAGE GLUCOSE: 88 MG/DL
HBA1C MFR BLD: 4.7 % (ref 4–6)
HEPATITIS C ANTIBODY: NONREACTIVE
HIV AG/AB: NONREACTIVE
SICKLE CELL SCREEN: NEGATIVE
T VAGINALIS VAG QL WET PREP: NORMAL
TRICHOMONAS VAGINALIS SCREEN: NEGATIVE
YEAST VAG QL WET PREP: NORMAL

## 2025-07-30 PROCEDURE — 93975 VASCULAR STUDY: CPT

## 2025-07-30 PROCEDURE — 76801 OB US < 14 WKS SINGLE FETUS: CPT

## 2025-07-30 PROCEDURE — 76817 TRANSVAGINAL US OBSTETRIC: CPT

## 2025-07-31 ENCOUNTER — APPOINTMENT (OUTPATIENT)
Facility: CLINIC | Age: 30
End: 2025-07-31
Payer: COMMERCIAL

## 2025-07-31 ENCOUNTER — RESULTS FOLLOW-UP (OUTPATIENT)
Dept: LABOR AND DELIVERY | Age: 30
End: 2025-07-31

## 2025-07-31 VITALS — WEIGHT: 144.6 LBS | DIASTOLIC BLOOD PRESSURE: 76 MMHG | BODY MASS INDEX: 25.61 KG/M2 | SYSTOLIC BLOOD PRESSURE: 117 MMHG

## 2025-07-31 DIAGNOSIS — A74.9 CHLAMYDIA: ICD-10-CM

## 2025-07-31 DIAGNOSIS — Z12.4 CERVICAL CANCER SCREENING: ICD-10-CM

## 2025-07-31 DIAGNOSIS — Z3A.01 6 WEEKS GESTATION OF PREGNANCY (HHS-HCC): Primary | ICD-10-CM

## 2025-07-31 DIAGNOSIS — Z34.81 PRENATAL CARE, SUBSEQUENT PREGNANCY IN FIRST TRIMESTER: Primary | ICD-10-CM

## 2025-07-31 PROBLEM — Z3A.39 39 WEEKS GESTATION OF PREGNANCY (HHS-HCC): Status: RESOLVED | Noted: 2025-01-14 | Resolved: 2025-07-31

## 2025-07-31 PROBLEM — Z3A.37 37 WEEKS GESTATION OF PREGNANCY (HHS-HCC): Status: RESOLVED | Noted: 2025-01-02 | Resolved: 2025-07-31

## 2025-07-31 PROBLEM — O26.843 UTERINE SIZE-DATE DISCREPANCY IN THIRD TRIMESTER (HHS-HCC): Status: RESOLVED | Noted: 2024-12-11 | Resolved: 2025-07-31

## 2025-07-31 PROBLEM — Z64.1 MULTIGRAVIDA: Status: RESOLVED | Noted: 2024-12-11 | Resolved: 2025-07-31

## 2025-07-31 PROBLEM — R09.81 CONGESTION OF PARANASAL SINUS: Status: RESOLVED | Noted: 2024-08-29 | Resolved: 2025-07-31

## 2025-07-31 LAB — BACTERIA UR CULT: NORMAL

## 2025-07-31 PROCEDURE — 99214 OFFICE O/P EST MOD 30 MIN: CPT | Performed by: OBSTETRICS & GYNECOLOGY

## 2025-07-31 RX ORDER — METOCLOPRAMIDE 10 MG/1
10 TABLET ORAL
COMMUNITY
Start: 2025-07-29

## 2025-07-31 ASSESSMENT — EDINBURGH POSTNATAL DEPRESSION SCALE (EPDS)
I HAVE BEEN ANXIOUS OR WORRIED FOR NO GOOD REASON: YES, SOMETIMES
I HAVE BEEN ABLE TO LAUGH AND SEE THE FUNNY SIDE OF THINGS: NOT QUITE SO MUCH NOW
I HAVE FELT SAD OR MISERABLE: YES, QUITE OFTEN
I HAVE FELT SCARED OR PANICKY FOR NO GOOD REASON: YES, SOMETIMES
THE THOUGHT OF HARMING MYSELF HAS OCCURRED TO ME: NEVER
TOTAL SCORE: 18
I HAVE BLAMED MYSELF UNNECESSARILY WHEN THINGS WENT WRONG: YES, SOME OF THE TIME
THINGS HAVE BEEN GETTING ON TOP OF ME: YES, SOMETIMES I HAVEN'T BEEN COPING AS WELL AS USUAL
I HAVE LOOKED FORWARD WITH ENJOYMENT TO THINGS: DEFINITELY LESS THAN I USED TO
I HAVE BEEN SO UNHAPPY THAT I HAVE HAD DIFFICULTY SLEEPING: YES, MOST OF THE TIME
I HAVE BEEN SO UNHAPPY THAT I HAVE BEEN CRYING: YES, QUITE OFTEN

## 2025-07-31 ASSESSMENT — ANXIETY QUESTIONNAIRES
6. BECOMING EASILY ANNOYED OR IRRITABLE: MORE THAN HALF THE DAYS
7. FEELING AFRAID AS IF SOMETHING AWFUL MIGHT HAPPEN: SEVERAL DAYS
1. FEELING NERVOUS, ANXIOUS, OR ON EDGE: NEARLY EVERY DAY
2. NOT BEING ABLE TO STOP OR CONTROL WORRYING: MORE THAN HALF THE DAYS
IF YOU CHECKED OFF ANY PROBLEMS ON THIS QUESTIONNAIRE, HOW DIFFICULT HAVE THESE PROBLEMS MADE IT FOR YOU TO DO YOUR WORK, TAKE CARE OF THINGS AT HOME, OR GET ALONG WITH OTHER PEOPLE: VERY DIFFICULT
5. BEING SO RESTLESS THAT IT IS HARD TO SIT STILL: MORE THAN HALF THE DAYS
GAD7 TOTAL SCORE: 16
4. TROUBLE RELAXING: NEARLY EVERY DAY
3. WORRYING TOO MUCH ABOUT DIFFERENT THINGS: NEARLY EVERY DAY

## 2025-07-31 NOTE — PROGRESS NOTES
Subjective   Patient ID: Kennedi Klein is a 29 y.o. female who presents for Initial Prenatal Visit (Pap: 10/25/2022 nml/Spotting on the day that she was supposed to have a period the first week of July. Had intercourse and light spotting on and off for 4-6 days after intercourse. No abdominal pain./Covid vaccine: declined/Flu vaccine: declined/No longer breastfeeding. /Periods since delivering her son have been irregular. /).  HPI   Sac seen in uterus  no FHR at 6 weeks by LMP     Last baby is 7 mo old , planned pregnancy . Doing well. No complaints.       Review of Systems  Neg   Objective   Physical Exam  Physical Exam  Constitutional:       Appearance: Normal appearance.     Abdominal:    Abdomen is flat. Soft with no masses, non tender       Genitourinary:     Labia:  No lesions  and No rash.       Urethra: No urethral lesion.      Bladder: no  prolapse     Vagina: Normal mucosa, pink and no discharge.     Cervix: Normal. Small w no lesions      Uterus:    Small. Non tender. No masses.      Adnexa:  Bilaterally with no mass or tenderness.                  Assessment/Plan   Diagnoses and all orders for this visit:  6 weeks gestation of pregnancy ([facility])  Cervical cancer screening     OB labs to be ordered after next visit w viability       Dejah Marion MD 07/31/25 11:29 AM

## 2025-07-31 NOTE — LETTER
7/31/2025    To Whom It May Concern:    Kennedi Klein is being followed for her pregnancy at [facility].  Estimated Date of Delivery: 3/23/26    Sincerely,        Dejah Marion MD  [facility]

## 2025-08-01 LAB
C TRACH DNA CVX QL NAA+PROBE: POSITIVE
N GONORRHOEA DNA CERV MUCUS QL NAA+PROBE: NEGATIVE
VZV IGG SER IA-ACNC: 0.98 S/CO

## 2025-08-01 RX ORDER — AZITHROMYCIN 500 MG/1
1000 TABLET, FILM COATED ORAL ONCE
Qty: 2 TABLET | Refills: 0 | Status: SHIPPED | OUTPATIENT
Start: 2025-08-01 | End: 2025-08-01

## 2025-08-02 LAB
BUPRENORPHINE GLUCURONIDE URINE: <5 NG/ML
BUPRENORPHINE UR CFM-MCNC: <2 NG/ML
NALOXONE URINE: <100 NG/ML
NORBUPRENORPHINE GLUCURONIDE URINE: <5 NG/ML
NORBUPRENORPHINE UR CFM-MCNC: <2 NG/ML

## 2025-08-13 ENCOUNTER — APPOINTMENT (OUTPATIENT)
Dept: OBSTETRICS AND GYNECOLOGY | Facility: CLINIC | Age: 30
End: 2025-08-13
Payer: COMMERCIAL

## 2025-08-14 ENCOUNTER — PATIENT MESSAGE (OUTPATIENT)
Dept: OBSTETRICS AND GYNECOLOGY | Facility: CLINIC | Age: 30
End: 2025-08-14
Payer: COMMERCIAL

## 2025-08-14 ENCOUNTER — TELEPHONE (OUTPATIENT)
Dept: OBSTETRICS AND GYNECOLOGY | Facility: CLINIC | Age: 30
End: 2025-08-14
Payer: COMMERCIAL

## 2025-08-14 DIAGNOSIS — R11.2 NAUSEA AND VOMITING, UNSPECIFIED VOMITING TYPE: Primary | ICD-10-CM

## 2025-08-14 RX ORDER — DOXYLAMINE SUCCINATE AND PYRIDOXINE HYDROCHLORIDE, DELAYED RELEASE TABLETS 10 MG/10 MG 10; 10 MG/1; MG/1
1 TABLET, DELAYED RELEASE ORAL 2 TIMES DAILY
Qty: 60 TABLET | Refills: 1 | Status: SHIPPED | OUTPATIENT
Start: 2025-08-14

## 2025-08-21 ENCOUNTER — APPOINTMENT (OUTPATIENT)
Dept: OBSTETRICS AND GYNECOLOGY | Facility: CLINIC | Age: 30
End: 2025-08-21
Payer: COMMERCIAL

## 2025-08-21 VITALS — SYSTOLIC BLOOD PRESSURE: 104 MMHG | DIASTOLIC BLOOD PRESSURE: 61 MMHG | WEIGHT: 149.2 LBS | BODY MASS INDEX: 26.43 KG/M2

## 2025-08-21 DIAGNOSIS — Z34.91 PRENATAL CARE IN FIRST TRIMESTER, UNSPECIFIED GRAVIDITY: ICD-10-CM

## 2025-08-21 DIAGNOSIS — O21.9 NAUSEA AND VOMITING IN PREGNANCY: ICD-10-CM

## 2025-08-21 DIAGNOSIS — Z34.81 MULTIGRAVIDA IN FIRST TRIMESTER (HHS-HCC): ICD-10-CM

## 2025-08-21 DIAGNOSIS — Z32.01 PREGNANCY TEST POSITIVE (HHS-HCC): Primary | ICD-10-CM

## 2025-08-21 DIAGNOSIS — Z3A.09 9 WEEKS GESTATION OF PREGNANCY (HHS-HCC): ICD-10-CM

## 2025-08-21 PROBLEM — Z64.1 MULTIGRAVIDA: Status: ACTIVE | Noted: 2025-08-21

## 2025-08-21 PROBLEM — Z3A.01 6 WEEKS GESTATION OF PREGNANCY (HHS-HCC): Status: RESOLVED | Noted: 2025-07-31 | Resolved: 2025-08-21

## 2025-08-21 PROCEDURE — 99214 OFFICE O/P EST MOD 30 MIN: CPT | Performed by: MIDWIFE

## 2025-08-25 ENCOUNTER — LAB (OUTPATIENT)
Dept: LAB | Facility: HOSPITAL | Age: 30
End: 2025-08-25
Payer: COMMERCIAL

## 2025-08-25 DIAGNOSIS — Z32.01 ENCOUNTER FOR PREGNANCY TEST, RESULT POSITIVE (HHS-HCC): Primary | ICD-10-CM

## 2025-08-25 LAB
ABO GROUP (TYPE) IN BLOOD: NORMAL
ANTIBODY SCREEN: NORMAL
ERYTHROCYTE [DISTWIDTH] IN BLOOD BY AUTOMATED COUNT: 12.5 % (ref 11.5–14.5)
HCT VFR BLD AUTO: 37 % (ref 36–46)
HGB BLD-MCNC: 12.2 G/DL (ref 12–16)
MCH RBC QN AUTO: 30.8 PG (ref 26–34)
MCHC RBC AUTO-ENTMCNC: 33 G/DL (ref 32–36)
MCV RBC AUTO: 93 FL (ref 80–100)
NRBC BLD-RTO: 0 /100 WBCS (ref 0–0)
PLATELET # BLD AUTO: 209 X10*3/UL (ref 150–450)
RBC # BLD AUTO: 3.96 X10*6/UL (ref 4–5.2)
RH FACTOR (ANTIGEN D): NORMAL
WBC # BLD AUTO: 7.4 X10*3/UL (ref 4.4–11.3)

## 2025-08-25 PROCEDURE — 86900 BLOOD TYPING SEROLOGIC ABO: CPT

## 2025-08-25 PROCEDURE — 86850 RBC ANTIBODY SCREEN: CPT

## 2025-08-25 PROCEDURE — 85027 COMPLETE CBC AUTOMATED: CPT

## 2025-08-25 PROCEDURE — 83021 HEMOGLOBIN CHROMOTOGRAPHY: CPT

## 2025-08-25 PROCEDURE — 86901 BLOOD TYPING SEROLOGIC RH(D): CPT

## 2025-08-26 LAB — REFLEX ADDED, ANEMIA PANEL: NORMAL

## 2025-08-28 ENCOUNTER — TELEPHONE (OUTPATIENT)
Dept: OBSTETRICS AND GYNECOLOGY | Facility: CLINIC | Age: 30
End: 2025-08-28
Payer: COMMERCIAL

## 2025-08-28 DIAGNOSIS — Z3A.10 10 WEEKS GESTATION OF PREGNANCY (HHS-HCC): Primary | ICD-10-CM

## 2025-08-28 LAB
HEMOGLOBIN A2: 3.1 % (ref 2–3.5)
HEMOGLOBIN A: 96.4 % (ref 95.8–98)
HEMOGLOBIN F: 0.5 % (ref 0–2)
HEMOGLOBIN IDENTIFICATION INTERPRETATION: NORMAL
PATH REVIEW-HGB IDENTIFICATION: NORMAL

## 2025-08-29 ENCOUNTER — HOSPITAL ENCOUNTER (OUTPATIENT)
Dept: RADIOLOGY | Facility: CLINIC | Age: 30
Discharge: HOME | End: 2025-08-29
Payer: COMMERCIAL

## 2025-08-29 DIAGNOSIS — Z34.91 PRENATAL CARE IN FIRST TRIMESTER, UNSPECIFIED GRAVIDITY: ICD-10-CM

## 2025-08-29 LAB
BACTERIA UR CULT: NORMAL
C TRACH RRNA SPEC QL NAA+PROBE: NORMAL
EST. AVERAGE GLUCOSE BLD GHB EST-MCNC: 85 MG/DL
EST. AVERAGE GLUCOSE BLD GHB EST-SCNC: 4.7 MMOL/L
HBA1C MFR BLD: 4.6 %
HIV 1+2 AB+HIV1 P24 AG SERPL QL IA: NORMAL
HIV 1+2 AB+HIV1 P24 AG SERPL QL IA: NORMAL
RUBV IGG SERPL IA-ACNC: <0.9 INDEX
T PALLIDUM AB SER QL IA: NEGATIVE
T VAGINALIS RRNA SPEC QL NAA+PROBE: NORMAL

## 2025-08-29 PROCEDURE — 76801 OB US < 14 WKS SINGLE FETUS: CPT

## 2025-09-04 ENCOUNTER — APPOINTMENT (OUTPATIENT)
Facility: CLINIC | Age: 30
End: 2025-09-04
Payer: COMMERCIAL

## 2025-09-08 ENCOUNTER — APPOINTMENT (OUTPATIENT)
Age: 30
End: 2025-09-08
Payer: COMMERCIAL

## 2025-09-09 ENCOUNTER — APPOINTMENT (OUTPATIENT)
Dept: OBSTETRICS AND GYNECOLOGY | Facility: CLINIC | Age: 30
End: 2025-09-09
Payer: COMMERCIAL

## 2025-09-17 ENCOUNTER — APPOINTMENT (OUTPATIENT)
Dept: OBSTETRICS AND GYNECOLOGY | Facility: CLINIC | Age: 30
End: 2025-09-17
Payer: COMMERCIAL

## 2025-09-24 ENCOUNTER — APPOINTMENT (OUTPATIENT)
Dept: BEHAVIORAL HEALTH | Facility: CLINIC | Age: 30
End: 2025-09-24
Payer: COMMERCIAL

## 2025-10-15 ENCOUNTER — APPOINTMENT (OUTPATIENT)
Dept: OBSTETRICS AND GYNECOLOGY | Facility: CLINIC | Age: 30
End: 2025-10-15
Payer: COMMERCIAL

## 2025-11-12 ENCOUNTER — APPOINTMENT (OUTPATIENT)
Dept: OBSTETRICS AND GYNECOLOGY | Facility: CLINIC | Age: 30
End: 2025-11-12
Payer: COMMERCIAL

## 2025-12-10 ENCOUNTER — APPOINTMENT (OUTPATIENT)
Dept: OBSTETRICS AND GYNECOLOGY | Facility: CLINIC | Age: 30
End: 2025-12-10
Payer: COMMERCIAL

## 2026-01-07 ENCOUNTER — APPOINTMENT (OUTPATIENT)
Dept: OBSTETRICS AND GYNECOLOGY | Facility: CLINIC | Age: 31
End: 2026-01-07
Payer: COMMERCIAL